# Patient Record
Sex: FEMALE | Race: WHITE | NOT HISPANIC OR LATINO | Employment: OTHER | URBAN - METROPOLITAN AREA
[De-identification: names, ages, dates, MRNs, and addresses within clinical notes are randomized per-mention and may not be internally consistent; named-entity substitution may affect disease eponyms.]

---

## 2017-03-02 ENCOUNTER — LAB VISIT (OUTPATIENT)
Dept: LAB | Facility: HOSPITAL | Age: 77
End: 2017-03-02
Attending: FAMILY MEDICINE
Payer: MEDICARE

## 2017-03-02 DIAGNOSIS — R73.09 ABNORMAL GLUCOSE: ICD-10-CM

## 2017-03-02 DIAGNOSIS — E78.49 OTHER HYPERLIPIDEMIA: ICD-10-CM

## 2017-03-02 LAB
ALBUMIN SERPL BCP-MCNC: 3.8 G/DL
ALP SERPL-CCNC: 56 U/L
ALT SERPL W/O P-5'-P-CCNC: 17 U/L
ANION GAP SERPL CALC-SCNC: 8 MMOL/L
AST SERPL-CCNC: 19 U/L
BILIRUB SERPL-MCNC: 0.4 MG/DL
BUN SERPL-MCNC: 20 MG/DL
CALCIUM SERPL-MCNC: 9.9 MG/DL
CHLORIDE SERPL-SCNC: 99 MMOL/L
CHOLEST/HDLC SERPL: 4 {RATIO}
CO2 SERPL-SCNC: 29 MMOL/L
CREAT SERPL-MCNC: 0.8 MG/DL
EST. GFR  (AFRICAN AMERICAN): >60 ML/MIN/1.73 M^2
EST. GFR  (NON AFRICAN AMERICAN): >60 ML/MIN/1.73 M^2
GLUCOSE SERPL-MCNC: 107 MG/DL
HDL/CHOLESTEROL RATIO: 24.9 %
HDLC SERPL-MCNC: 265 MG/DL
HDLC SERPL-MCNC: 66 MG/DL
LDLC SERPL CALC-MCNC: 173.8 MG/DL
NONHDLC SERPL-MCNC: 199 MG/DL
POTASSIUM SERPL-SCNC: 3.5 MMOL/L
PROT SERPL-MCNC: 7.5 G/DL
SODIUM SERPL-SCNC: 136 MMOL/L
TRIGL SERPL-MCNC: 126 MG/DL

## 2017-03-02 PROCEDURE — 83036 HEMOGLOBIN GLYCOSYLATED A1C: CPT

## 2017-03-02 PROCEDURE — 80053 COMPREHEN METABOLIC PANEL: CPT

## 2017-03-02 PROCEDURE — 36415 COLL VENOUS BLD VENIPUNCTURE: CPT | Mod: PO

## 2017-03-02 PROCEDURE — 80061 LIPID PANEL: CPT

## 2017-03-03 LAB
ESTIMATED AVG GLUCOSE: 114 MG/DL
HBA1C MFR BLD HPLC: 5.6 %

## 2017-03-10 ENCOUNTER — OFFICE VISIT (OUTPATIENT)
Dept: FAMILY MEDICINE | Facility: CLINIC | Age: 77
End: 2017-03-10
Payer: MEDICARE

## 2017-03-10 VITALS
DIASTOLIC BLOOD PRESSURE: 82 MMHG | OXYGEN SATURATION: 95 % | HEART RATE: 90 BPM | SYSTOLIC BLOOD PRESSURE: 138 MMHG | BODY MASS INDEX: 19.19 KG/M2 | HEIGHT: 61 IN | WEIGHT: 101.63 LBS

## 2017-03-10 DIAGNOSIS — E78.5 HYPERLIPIDEMIA, UNSPECIFIED HYPERLIPIDEMIA TYPE: ICD-10-CM

## 2017-03-10 DIAGNOSIS — R73.09 ABNORMAL GLUCOSE: ICD-10-CM

## 2017-03-10 DIAGNOSIS — E78.00 PURE HYPERCHOLESTEROLEMIA: ICD-10-CM

## 2017-03-10 DIAGNOSIS — I11.9 LEFT VENTRICULAR HYPERTROPHY DUE TO HYPERTENSIVE DISEASE, WITHOUT HEART FAILURE: ICD-10-CM

## 2017-03-10 DIAGNOSIS — I51.89 DIASTOLIC DYSFUNCTION: ICD-10-CM

## 2017-03-10 DIAGNOSIS — I10 ESSENTIAL HYPERTENSION, BENIGN: Primary | ICD-10-CM

## 2017-03-10 DIAGNOSIS — E87.6 HYPOKALEMIA: ICD-10-CM

## 2017-03-10 PROCEDURE — 99214 OFFICE O/P EST MOD 30 MIN: CPT | Mod: S$PBB,,, | Performed by: FAMILY MEDICINE

## 2017-03-10 PROCEDURE — 99213 OFFICE O/P EST LOW 20 MIN: CPT | Mod: PBBFAC,PO | Performed by: FAMILY MEDICINE

## 2017-03-10 PROCEDURE — 99999 PR PBB SHADOW E&M-EST. PATIENT-LVL III: CPT | Mod: PBBFAC,,, | Performed by: FAMILY MEDICINE

## 2017-03-10 RX ORDER — POTASSIUM CHLORIDE 20 MEQ/1
20 TABLET, EXTENDED RELEASE ORAL DAILY
Qty: 90 TABLET | Refills: 3 | Status: SHIPPED | OUTPATIENT
Start: 2017-03-10 | End: 2017-10-23 | Stop reason: SDUPTHER

## 2017-03-10 RX ORDER — VALSARTAN AND HYDROCHLOROTHIAZIDE 320; 25 MG/1; MG/1
1 TABLET, FILM COATED ORAL DAILY
Qty: 90 TABLET | Refills: 3 | Status: SHIPPED | OUTPATIENT
Start: 2017-03-10 | End: 2017-10-23 | Stop reason: SDUPTHER

## 2017-03-10 RX ORDER — NEOMYCIN SULFATE, POLYMYXIN B SULFATE, AND DEXAMETHASONE 3.5; 10000; 1 MG/G; [USP'U]/G; MG/G
OINTMENT OPHTHALMIC
Refills: 0 | COMMUNITY
Start: 2017-03-07 | End: 2019-02-26

## 2017-03-10 RX ORDER — OFLOXACIN 3 MG/ML
SOLUTION/ DROPS OPHTHALMIC
Refills: 0 | COMMUNITY
Start: 2017-03-07 | End: 2019-02-26

## 2017-03-10 RX ORDER — ROSUVASTATIN CALCIUM 20 MG/1
20 TABLET, COATED ORAL DAILY
Qty: 90 TABLET | Refills: 3 | Status: SHIPPED | OUTPATIENT
Start: 2017-03-10 | End: 2017-10-23 | Stop reason: SDUPTHER

## 2017-03-10 RX ORDER — ACETAMINOPHEN AND CODEINE PHOSPHATE 300; 30 MG/1; MG/1
1 TABLET ORAL
Refills: 0 | COMMUNITY
Start: 2017-01-11 | End: 2017-05-15

## 2017-03-10 RX ORDER — AMLODIPINE BESYLATE 5 MG/1
TABLET ORAL
Qty: 90 TABLET | Refills: 3 | Status: SHIPPED | OUTPATIENT
Start: 2017-03-10 | End: 2017-05-15 | Stop reason: DRUGHIGH

## 2017-03-10 NOTE — PROGRESS NOTES
Subjective:       Patient ID: Trudy Horvath is a 76 y.o. female.    Chief Complaint: Follow-up (4 mos); Hypertension; and Hyperlipidemia    Hypertension   This is a chronic problem. The current episode started more than 1 year ago. The problem is unchanged. Pertinent negatives include no chest pain, headaches, orthopnea, palpitations, peripheral edema, PND or shortness of breath. Past treatments include calcium channel blockers, angiotensin blockers and diuretics. The current treatment provides significant improvement. There are no compliance problems.  Hypertensive end-organ damage includes left ventricular hypertrophy. There is no history of chronic renal disease.   Hyperlipidemia   This is a chronic problem. The current episode started more than 1 year ago. The problem is controlled. Recent lipid tests were reviewed and are normal. She has no history of chronic renal disease, diabetes, hypothyroidism, liver disease, obesity or nephrotic syndrome. Pertinent negatives include no chest pain or shortness of breath.   Pt is being followed by Ophthalmology for macular degeneration. Pt has been told that she will need injections in her eye due to edema. 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 Pt is followed by Dr. Wilmer Hand.  Pt is not sure if she is taking her Rosuvastatin regularly. Is not exercising on a daily basis.  Pt has LVH and diastolic dysfunction by 2D Echo February 2015.  Results for orders placed or performed in visit on 03/02/17   Lipid panel   Result Value Ref Range    Cholesterol 265 (H) 120 - 199 mg/dL    Triglycerides 126 30 - 150 mg/dL    HDL 66 40 - 75 mg/dL    LDL Cholesterol 173.8 (H) 63.0 - 159.0 mg/dL    HDL/Chol Ratio 24.9 20.0 - 50.0 %    Total Cholesterol/HDL Ratio 4.0 2.0 - 5.0    Non-HDL Cholesterol 199 mg/dL   Comprehensive metabolic panel   Result Value Ref Range    Sodium 136 136 - 145 mmol/L    Potassium 3.5 3.5 - 5.1 mmol/L    Chloride 99 95 - 110 mmol/L     CO2 29 23 - 29 mmol/L    Glucose 107 70 - 110 mg/dL    BUN, Bld 20 8 - 23 mg/dL    Creatinine 0.8 0.5 - 1.4 mg/dL    Calcium 9.9 8.7 - 10.5 mg/dL    Total Protein 7.5 6.0 - 8.4 g/dL    Albumin 3.8 3.5 - 5.2 g/dL    Total Bilirubin 0.4 0.1 - 1.0 mg/dL    Alkaline Phosphatase 56 55 - 135 U/L    AST 19 10 - 40 U/L    ALT 17 10 - 44 U/L    Anion Gap 8 8 - 16 mmol/L    eGFR if African American >60.0 >60 mL/min/1.73 m^2    eGFR if non African American >60.0 >60 mL/min/1.73 m^2   Hemoglobin A1c   Result Value Ref Range    Hemoglobin A1C 5.6 4.5 - 6.2 %    Estimated Avg Glucose 114 68 - 131 mg/dL     Review of Systems   Constitutional: Negative for chills and fever.   Respiratory: Negative for shortness of breath.    Cardiovascular: Negative for chest pain, palpitations, orthopnea, leg swelling and PND.   Neurological: Negative for headaches.       Objective:      Physical Exam   Constitutional: She appears well-developed and well-nourished.   HENT:   Head: Normocephalic and atraumatic.   Neck: Normal range of motion. Neck supple. No JVD present. No thyromegaly present.   Pulmonary/Chest: Effort normal and breath sounds normal. She has no wheezes. She has no rales.   Abdominal: Soft. Bowel sounds are normal. She exhibits no mass. There is no tenderness.   Lymphadenopathy:     She has no cervical adenopathy.   Vitals reviewed.      Assessment:         See plan  Plan:       Trudy was seen today for follow-up, hypertension and hyperlipidemia.    Diagnoses and all orders for this visit:    Essential hypertension, benign: Controlled  -     amlodipine (NORVASC) 5 MG tablet; TAKE 1 TABLET (5 MG) DAILY  -     valsartan-hydrochlorothiazide (DIOVAN-HCT) 320-25 mg per tablet; Take 1 tablet by mouth once daily.    Left ventricular hypertrophy due to hypertensive disease, without heart failure: Stable    Hyperlipidemia, unspecified hyperlipidemia type: Uncontrolled  -     Stressed importance of taking rosuvastatin (CRESTOR) 20 MG  tablet; Take 1 tablet (20 mg total) by mouth once daily.  -     Lipid panel; Future  -     Comprehensive metabolic panel; Future    Body mass index (BMI) 19 or less, adult: Stable    Abnormal glucose  Pt advised to decrease intake of white bread, white rice, corn, potatoes, pasta and sugar.  -     Hemoglobin A1c; Future    Diastolic dysfunction: Stable      Hypokalemia: Controlled  -     potassium chloride SA (K-DUR,KLOR-CON) 20 MEQ tablet; Take 1 tablet (20 mEq total) by mouth once daily.    F/U in 4 weeks to recheck cholesterol. Pt to bring all medications to the visit with her for medication review.

## 2017-03-10 NOTE — MR AVS SNAPSHOT
[facility]   [place] [address]  Phone: [phone]  Fax: [phone]                  Trudy Horvath   3/10/2017 8:40 AM   Office Visit    Description:  Female : 1940   Provider:  Maria C Guzman MD   Department:  [facility]           Reason for Visit     Follow-up     Hypertension     Hyperlipidemia           Diagnoses this Visit        Comments    Essential hypertension, benign    -  Primary     Left ventricular hypertrophy due to hypertensive disease, without heart failure         Hyperlipidemia, unspecified hyperlipidemia type         Body mass index (BMI) 19 or less, adult         Abnormal glucose         Diastolic dysfunction         Pure hypercholesterolemia         Hypokalemia                To Do List           Future Appointments        Provider Department Dept Phone    2017 7:30 AM LAB, [facility] [phone]    2017 1:00 PM Maria C Guzman MD [facility] [phone]      Goals (5 Years of Data)     None      Follow-Up and Disposition     Return in about 4 weeks (around 2017).       These Medications        Disp Refills Start End    rosuvastatin (CRESTOR) 20 MG tablet 90 tablet 3 3/10/2017 3/10/2018    Take 1 tablet (20 mg total) by mouth once daily. - Oral    Pharmacy: [facility] - [address] Ph #: [phone]       potassium chloride SA (K-DUR,KLOR-CON) 20 MEQ tablet 90 tablet 3 3/10/2017     Take 1 tablet (20 mEq total) by mouth once daily. - Oral    Pharmacy: [facility] - [address] Ph #: [phone]       amlodipine (NORVASC) 5 MG tablet 90 tablet 3 3/10/2017     TAKE 1 TABLET (5 MG) DAILY    Pharmacy: [facility] - [address] Ph #: [phone]       valsartan-hydrochlorothiazide (DIOVAN-HCT) 320-25 mg per tablet 90 tablet 3  3/10/2017 3/10/2018    Take 1 tablet by mouth once daily. - Oral    Pharmacy: [facility] - [address] #: [phone]         [facility] On Call     [facility] On Call Nurse Care Line - 24/7 Assistance  Registered nurses in the [facility] On Call Center provide clinical advisement, health education, appointment booking, and other advisory services.  Call for this free service at [phone].             Medications           Message regarding Medications     Verify the changes and/or additions to your medication regime listed below are the same as discussed with your clinician today.  If any of these changes or additions are incorrect, please notify your healthcare provider.             Verify that the below list of medications is an accurate representation of the medications you are currently taking.  If none reported, the list may be blank. If incorrect, please contact your healthcare provider. Carry this list with you in case of emergency.           Current Medications     acetaminophen-codeine 300-30mg (TYLENOL #3) 300-30 mg Tab Take 1 tablet by mouth every 4 to 6 hours as needed.    amlodipine (NORVASC) 5 MG tablet TAKE 1 TABLET (5 MG) DAILY    aspirin 81 MG Chew Take 81 mg by mouth once daily.    denosumab (PROLIA) 60 mg/mL Syrg Inject 60 mg into the skin every 6 (six) months.    diclofenac (VOLTAREN) 75 MG EC tablet Take 75 mg by mouth 2 (two) times daily with meals.    diclofenac sodium 1 % Gel     gentamicin (GARAMYCIN) 0.3 % ophthalmic solution     ISTALOL 0.5 % DrpD     neomycin-polymyxin-dexamethasone (DEXACINE) 3.5 mg/g-10,000 unit/g-0.1 % Oint apply into right eye four times a day for 1 week    ofloxacin (OCUFLOX) 0.3 % ophthalmic solution instill 1 drop into right eye four times a day for 4 days    potassium chloride SA (K-DUR,KLOR-CON) 20 MEQ tablet Take 1 tablet (20 mEq total) by mouth once daily.    rosuvastatin (CRESTOR) 20 MG tablet Take 1 tablet (20 mg  "total) by mouth once daily.    valsartan-hydrochlorothiazide (DIOVAN-HCT) 320-25 mg per tablet Take 1 tablet by mouth once daily.    VIGAMOX 0.5 % ophthalmic solution            Clinical Reference Information           Your Vitals Were     BP Pulse Height Weight SpO2 BMI    138/82 90 5' 1" (1.549 m) 46.1 kg (101 lb 10.1 oz) 95% 19.2 kg/m2      Blood Pressure          Most Recent Value    BP  138/82      Allergies as of 3/10/2017     No Known Allergies      Immunizations Administered on Date of Encounter - 3/10/2017     None      Orders Placed During Today's Visit     Future Labs/Procedures Expected by Expires    Comprehensive metabolic panel  3/10/2017 3/10/2018    Hemoglobin A1c  3/10/2017 3/10/2018    Lipid panel  3/10/2017 3/10/2018      Instructions    Bring all medications to next visit.       Language Assistance Services     ATTENTION: Language assistance services are available, free of charge. Please call [phone].      ATENCIÓN: Si habla doreen, tiene a brock disposición servicios gratuitos de asistencia lingüística. Llame al [phone].     CHÚ Ý: N?u b?n nói Ti?ng Vi?t, có các d?ch v? h? tr? ngôn ng? mi?n phí jerrih cho b?n. G?i s? [phone].         [facility] complies with applicable Federal civil rights laws and does not discriminate on the basis of race, color, national origin, age, disability, or sex.        "

## 2017-05-08 ENCOUNTER — TELEPHONE (OUTPATIENT)
Dept: INTERNAL MEDICINE | Facility: CLINIC | Age: 77
End: 2017-05-08

## 2017-05-09 ENCOUNTER — LAB VISIT (OUTPATIENT)
Dept: LAB | Facility: HOSPITAL | Age: 77
End: 2017-05-09
Attending: FAMILY MEDICINE
Payer: MEDICARE

## 2017-05-09 DIAGNOSIS — E78.5 HYPERLIPIDEMIA, UNSPECIFIED HYPERLIPIDEMIA TYPE: ICD-10-CM

## 2017-05-09 DIAGNOSIS — R73.09 ABNORMAL GLUCOSE: ICD-10-CM

## 2017-05-09 LAB
ALBUMIN SERPL BCP-MCNC: 3.6 G/DL
ALP SERPL-CCNC: 49 U/L
ALT SERPL W/O P-5'-P-CCNC: 15 U/L
ANION GAP SERPL CALC-SCNC: 10 MMOL/L
AST SERPL-CCNC: 19 U/L
BILIRUB SERPL-MCNC: 0.5 MG/DL
BUN SERPL-MCNC: 14 MG/DL
CALCIUM SERPL-MCNC: 9.9 MG/DL
CHLORIDE SERPL-SCNC: 99 MMOL/L
CHOLEST/HDLC SERPL: 3.1 {RATIO}
CO2 SERPL-SCNC: 30 MMOL/L
CREAT SERPL-MCNC: 0.8 MG/DL
EST. GFR  (AFRICAN AMERICAN): >60 ML/MIN/1.73 M^2
EST. GFR  (NON AFRICAN AMERICAN): >60 ML/MIN/1.73 M^2
ESTIMATED AVG GLUCOSE: 123 MG/DL
GLUCOSE SERPL-MCNC: 113 MG/DL
HBA1C MFR BLD HPLC: 5.9 %
HDL/CHOLESTEROL RATIO: 32 %
HDLC SERPL-MCNC: 200 MG/DL
HDLC SERPL-MCNC: 64 MG/DL
LDLC SERPL CALC-MCNC: 110.8 MG/DL
NONHDLC SERPL-MCNC: 136 MG/DL
POTASSIUM SERPL-SCNC: 3.4 MMOL/L
PROT SERPL-MCNC: 6.9 G/DL
SODIUM SERPL-SCNC: 139 MMOL/L
TRIGL SERPL-MCNC: 126 MG/DL

## 2017-05-09 PROCEDURE — 80053 COMPREHEN METABOLIC PANEL: CPT

## 2017-05-09 PROCEDURE — 83036 HEMOGLOBIN GLYCOSYLATED A1C: CPT

## 2017-05-09 PROCEDURE — 36415 COLL VENOUS BLD VENIPUNCTURE: CPT | Mod: PO

## 2017-05-09 PROCEDURE — 80061 LIPID PANEL: CPT

## 2017-05-15 ENCOUNTER — OFFICE VISIT (OUTPATIENT)
Dept: FAMILY MEDICINE | Facility: CLINIC | Age: 77
End: 2017-05-15
Payer: MEDICARE

## 2017-05-15 VITALS
DIASTOLIC BLOOD PRESSURE: 80 MMHG | BODY MASS INDEX: 19.19 KG/M2 | HEIGHT: 61 IN | WEIGHT: 101.63 LBS | OXYGEN SATURATION: 95 % | HEART RATE: 95 BPM | SYSTOLIC BLOOD PRESSURE: 150 MMHG

## 2017-05-15 DIAGNOSIS — Z09 ENCOUNTER FOR FOLLOW-UP EXAMINATION AFTER COMPLETED TREATMENT FOR CONDITIONS OTHER THAN MALIGNANT NEOPLASM: ICD-10-CM

## 2017-05-15 DIAGNOSIS — Z12.11 COLON CANCER SCREENING: ICD-10-CM

## 2017-05-15 DIAGNOSIS — Z23 NEED FOR 23-POLYVALENT PNEUMOCOCCAL POLYSACCHARIDE VACCINE: ICD-10-CM

## 2017-05-15 DIAGNOSIS — R73.09 ABNORMAL GLUCOSE: ICD-10-CM

## 2017-05-15 DIAGNOSIS — I10 ESSENTIAL HYPERTENSION, BENIGN: ICD-10-CM

## 2017-05-15 DIAGNOSIS — E78.5 HYPERLIPIDEMIA, UNSPECIFIED HYPERLIPIDEMIA TYPE: Primary | ICD-10-CM

## 2017-05-15 PROCEDURE — 90732 PPSV23 VACC 2 YRS+ SUBQ/IM: CPT | Mod: PBBFAC,PO | Performed by: FAMILY MEDICINE

## 2017-05-15 PROCEDURE — 99999 PR PBB SHADOW E&M-EST. PATIENT-LVL III: CPT | Mod: PBBFAC,,, | Performed by: FAMILY MEDICINE

## 2017-05-15 PROCEDURE — 99213 OFFICE O/P EST LOW 20 MIN: CPT | Mod: PBBFAC,PO | Performed by: FAMILY MEDICINE

## 2017-05-15 PROCEDURE — 99214 OFFICE O/P EST MOD 30 MIN: CPT | Mod: S$PBB,,, | Performed by: FAMILY MEDICINE

## 2017-05-15 RX ORDER — AMLODIPINE BESYLATE 10 MG/1
10 TABLET ORAL DAILY
Qty: 90 TABLET | Refills: 1 | Status: SHIPPED | OUTPATIENT
Start: 2017-05-15 | End: 2017-10-23 | Stop reason: SDUPTHER

## 2017-05-15 NOTE — PROGRESS NOTES
Subjective:       Patient ID: Trudy Horvaht is a 76 y.o. female.    Chief Complaint: Follow-up (4 wks); Hypertension; and Hyperlipidemia  75 yo female with HTN, hyperlipidemia, macular degeneration and pre-diabetes presents for f/u hyperlipidemia and HTN.  Hypertension   This is a chronic problem. The current episode started more than 1 year ago. The problem is unchanged. The problem is controlled. Pertinent negatives include no chest pain, orthopnea, palpitations, peripheral edema, PND or shortness of breath. Past treatments include angiotensin blockers, diuretics and calcium channel blockers. The current treatment provides moderate improvement. There are no compliance problems.  Hypertensive end-organ damage includes left ventricular hypertrophy. There is no history of kidney disease. There is no history of chronic renal disease.   Hyperlipidemia   This is a chronic problem. The current episode started more than 1 year ago. The problem is controlled. Recent lipid tests were reviewed and are normal. She has no history of chronic renal disease, diabetes, hypothyroidism, liver disease, obesity or nephrotic syndrome. Pertinent negatives include no chest pain or shortness of breath. Current antihyperlipidemic treatment includes statins. The current treatment provides significant improvement of lipids. There are no compliance problems.    Pt notes pain in her right arm due to a botle of Coke striking her arm. Pt to see Orthopedics tomorrow.  Results for orders placed or performed in visit on 05/09/17   Lipid panel   Result Value Ref Range    Cholesterol 200 (H) 120 - 199 mg/dL    Triglycerides 126 30 - 150 mg/dL    HDL 64 40 - 75 mg/dL    LDL Cholesterol 110.8 63.0 - 159.0 mg/dL    HDL/Chol Ratio 32.0 20.0 - 50.0 %    Total Cholesterol/HDL Ratio 3.1 2.0 - 5.0    Non-HDL Cholesterol 136 mg/dL   Comprehensive metabolic panel   Result Value Ref Range    Sodium 139 136 - 145 mmol/L    Potassium 3.4 (L) 3.5 - 5.1 mmol/L     Chloride 99 95 - 110 mmol/L    CO2 30 (H) 23 - 29 mmol/L    Glucose 113 (H) 70 - 110 mg/dL    BUN, Bld 14 8 - 23 mg/dL    Creatinine 0.8 0.5 - 1.4 mg/dL    Calcium 9.9 8.7 - 10.5 mg/dL    Total Protein 6.9 6.0 - 8.4 g/dL    Albumin 3.6 3.5 - 5.2 g/dL    Total Bilirubin 0.5 0.1 - 1.0 mg/dL    Alkaline Phosphatase 49 (L) 55 - 135 U/L    AST 19 10 - 40 U/L    ALT 15 10 - 44 U/L    Anion Gap 10 8 - 16 mmol/L    eGFR if African American >60.0 >60 mL/min/1.73 m^2    eGFR if non African American >60.0 >60 mL/min/1.73 m^2   Hemoglobin A1c   Result Value Ref Range    Hemoglobin A1C 5.9 4.5 - 6.2 %    Estimated Avg Glucose 123 68 - 131 mg/dL     Review of Systems   Constitutional: Negative for chills and fever.   Respiratory: Negative for shortness of breath.    Cardiovascular: Negative for chest pain, palpitations, orthopnea, leg swelling and PND.   Musculoskeletal:        See HPI       Objective:      Physical Exam   Constitutional: She appears well-developed and well-nourished.   HENT:   Head: Normocephalic and atraumatic.   Neck: Normal range of motion. Neck supple. No JVD present. No thyromegaly present.   Cardiovascular: Normal rate, regular rhythm and normal heart sounds.    Pulmonary/Chest: Effort normal and breath sounds normal. She has no wheezes. She has no rales.   Abdominal: Soft. Bowel sounds are normal. She exhibits no mass. There is no tenderness.   Lymphadenopathy:     She has no cervical adenopathy.   Skin: Skin is warm and dry.   Vitals reviewed.      Assessment:         See plan  Plan:       Trudy was seen today for follow-up, hypertension and hyperlipidemia.    Diagnoses and all orders for this visit:    Hyperlipidemia, unspecified hyperlipidemia type: Controlled    Essential hypertension, benign: Uncontrolled  -     Increase amlodipine (NORVASC) 10 MG tablet; Take 1 tablet (10 mg total) by mouth once daily.  -    Recheck BP in 4 weeks.    Body mass index (BMI) 19 or less, adult: Stable    Abnormal  glucose  Pt advised to decrease intake of white bread, white rice, corn, potatoes, pasta and sugar.    Colon cancer screening  -     Occult blood x 1, stool; Future  -     Occult blood x 1, stool; Future  -     Occult blood x 1, stool; Future    Need for 23-polyvalent pneumococcal polysaccharide vaccine  -     Pneumococcal Polysaccharide Vaccine (23 Valent) (SQ/IM)    Encounter for follow-up examination after completed treatment for conditions other than malignant neoplasm   -     Occult blood x 1, stool; Future  -     Occult blood x 1, stool; Future  -     Occult blood x 1, stool; Future    F/U in 4 weeks for recheck of HTN.

## 2017-05-15 NOTE — MR AVS SNAPSHOT
[facility]   [place]  [place] [address]  Phone: [phone]  Fax: [phone]                  Trudy Horvath   5/15/2017 2:00 PM   Office Visit    Description:  Female : 1940   Provider:  Maria C Guzman MD   Department:  [facility]           Reason for Visit     Follow-up     Hypertension     Hyperlipidemia           Diagnoses this Visit        Comments    Hyperlipidemia, unspecified hyperlipidemia type    -  Primary     Essential hypertension, benign         Body mass index (BMI) 19 or less, adult         Abnormal glucose         Colon cancer screening         Need for 23-polyvalent pneumococcal polysaccharide vaccine         Encounter for follow-up examination after completed treatment for conditions other than malignant neoplasm                To Do List           Future Appointments        Provider Department Dept Phone    2017 4:20 PM Maria C Guzman MD [facility] [phone]      Goals (5 Years of Data)     None      Follow-Up and Disposition     Return in about 4 weeks (around 2017) for recheck of blood pressure.       These Medications        Disp Refills Start End    amlodipine (NORVASC) 10 MG tablet 90 tablet 1 5/15/2017 5/15/2018    Take 1 tablet (10 mg total) by mouth once daily. - Oral    Pharmacy: [facility] - [address] #: [phone]         [facility] On Call     [facility] On Call Nurse Care Line -  Assistance  Unless otherwise directed by your provider, please contact [facility], our nurse care line that is available for / assistance.     Registered nurses in the [facility] On Call Center provide: appointment scheduling, clinical advisement, health education, and other advisory services.  Call: [phone] (toll free)               Medications           Message regarding Medications     Verify the changes and/or additions to your medication  "regime listed below are the same as discussed with your clinician today.  If any of these changes or additions are incorrect, please notify your healthcare provider.        START taking these NEW medications        Refills    amlodipine (NORVASC) 10 MG tablet 1    Sig: Take 1 tablet (10 mg total) by mouth once daily.    Class: Normal    Route: Oral      STOP taking these medications     acetaminophen-codeine 300-30mg (TYLENOL #3) 300-30 mg Tab Take 1 tablet by mouth every 4 to 6 hours as needed.           Verify that the below list of medications is an accurate representation of the medications you are currently taking.  If none reported, the list may be blank. If incorrect, please contact your healthcare provider. Carry this list with you in case of emergency.           Current Medications     aspirin 81 MG Chew Take 81 mg by mouth once daily.    denosumab (PROLIA) 60 mg/mL Syrg Inject 60 mg into the skin every 6 (six) months.    diclofenac (VOLTAREN) 75 MG EC tablet Take 75 mg by mouth 2 (two) times daily with meals.    diclofenac sodium 1 % Gel     gentamicin (GARAMYCIN) 0.3 % ophthalmic solution     ISTALOL 0.5 % DrpD     neomycin-polymyxin-dexamethasone (DEXACINE) 3.5 mg/g-10,000 unit/g-0.1 % Oint apply into right eye four times a day for 1 week    ofloxacin (OCUFLOX) 0.3 % ophthalmic solution instill 1 drop into right eye four times a day for 4 days    potassium chloride SA (K-DUR,KLOR-CON) 20 MEQ tablet Take 1 tablet (20 mEq total) by mouth once daily.    rosuvastatin (CRESTOR) 20 MG tablet Take 1 tablet (20 mg total) by mouth once daily.    valsartan-hydrochlorothiazide (DIOVAN-HCT) 320-25 mg per tablet Take 1 tablet by mouth once daily.    VIGAMOX 0.5 % ophthalmic solution     amlodipine (NORVASC) 10 MG tablet Take 1 tablet (10 mg total) by mouth once daily.           Clinical Reference Information           Your Vitals Were     BP Pulse Height Weight SpO2 BMI    150/80 95 5' 1" (1.549 m) 46.1 kg (101 lb " 10.1 oz) 95% 19.2 kg/m2      Blood Pressure          Most Recent Value    BP  (!)  150/80      Allergies as of 5/15/2017     No Known Allergies      Immunizations Administered on Date of Encounter - 5/15/2017     Name Date Dose VIS Date Route    Pneumococcal Polysaccharide - 23 Valent  Incomplete 0.5 mL 4/24/2015 Intramuscular      Orders Placed During Today's Visit      Normal Orders This Visit    Pneumococcal Polysaccharide Vaccine (23 Valent) (SQ/IM)     Future Labs/Procedures Expected by Expires    Occult blood x 1, stool  5/15/2017 5/15/2018    Occult blood x 1, stool  5/15/2017 5/15/2018    Occult blood x 1, stool  5/15/2017 5/15/2018      Language Assistance Services     ATTENTION: Language assistance services are available, free of charge. Please call [phone].      ATENCIÓN: Si habla doreen, tiene a brock disposición servicios gratuitos de asistencia lingüística. Llame al [phone].     CHÚ Ý: N?u b?n nói Ti?ng Vi?t, có các d?ch v? h? tr? ngôn ng? mi?n phí dành cho b?n. G?i s? [phone].         [facility] complies with applicable Federal civil rights laws and does not discriminate on the basis of race, color, national origin, age, disability, or sex.

## 2017-05-27 ENCOUNTER — DOCUMENTATION ONLY (OUTPATIENT)
Dept: FAMILY MEDICINE | Facility: CLINIC | Age: 77
End: 2017-05-27

## 2017-05-27 NOTE — PROGRESS NOTES
Orthopedics appt with Dr. Earl Bardales on 5/16/17 for shoulder pain and lumbar spondylosis. Received a lumbar trigger point injection with Kenalog and Lidocaine and right subacromial injection.

## 2017-05-31 ENCOUNTER — OFFICE VISIT (OUTPATIENT)
Dept: FAMILY MEDICINE | Facility: CLINIC | Age: 77
End: 2017-05-31
Payer: MEDICARE

## 2017-05-31 VITALS
SYSTOLIC BLOOD PRESSURE: 138 MMHG | WEIGHT: 100.06 LBS | HEIGHT: 61 IN | BODY MASS INDEX: 18.89 KG/M2 | DIASTOLIC BLOOD PRESSURE: 88 MMHG | HEART RATE: 95 BPM | OXYGEN SATURATION: 99 %

## 2017-05-31 DIAGNOSIS — R73.09 ABNORMAL GLUCOSE: ICD-10-CM

## 2017-05-31 DIAGNOSIS — I11.9 LEFT VENTRICULAR HYPERTROPHY DUE TO HYPERTENSIVE DISEASE, WITHOUT HEART FAILURE: ICD-10-CM

## 2017-05-31 DIAGNOSIS — I51.89 DIASTOLIC DYSFUNCTION: ICD-10-CM

## 2017-05-31 DIAGNOSIS — I10 ESSENTIAL HYPERTENSION, BENIGN: Primary | ICD-10-CM

## 2017-05-31 PROCEDURE — 99214 OFFICE O/P EST MOD 30 MIN: CPT | Mod: S$PBB,,, | Performed by: FAMILY MEDICINE

## 2017-05-31 PROCEDURE — 99213 OFFICE O/P EST LOW 20 MIN: CPT | Mod: PBBFAC,PO | Performed by: FAMILY MEDICINE

## 2017-05-31 PROCEDURE — 99999 PR PBB SHADOW E&M-EST. PATIENT-LVL III: CPT | Mod: PBBFAC,,, | Performed by: FAMILY MEDICINE

## 2017-05-31 NOTE — PROGRESS NOTES
Subjective:       Patient ID: Trudy Horvath is a 76 y.o. female.    Chief Complaint: Follow-up (3 month) and Hypertension  75 yo female with HTN, hyperlipidemia and pre-diabetes presents for f/u uncontrolled HTN.  Hypertension   This is a chronic problem. The current episode started more than 1 year ago. The problem is unchanged. Pertinent negatives include no chest pain, orthopnea, palpitations, peripheral edema, PND or shortness of breath. Past treatments include calcium channel blockers, angiotensin blockers and diuretics. The current treatment provides significant improvement. There are no compliance problems.  Hypertensive end-organ damage includes left ventricular hypertrophy.   Ambulatory BP readings have been controlled. BP Has ranged from 110-140/60-70. Is checking her BGs weekly. Amlodipine increased to 10 mg daily at last visit  5/15/17.  Pt has LVH and diastolic dysfunction by 2D Echo done 2/2/15.  Review of Systems   Respiratory: Negative for shortness of breath.    Cardiovascular: Negative for chest pain, palpitations, orthopnea and PND.       Objective:      Physical Exam   Constitutional: She is oriented to person, place, and time. She appears well-developed and well-nourished.   HENT:   Head: Normocephalic and atraumatic.   Neck: Normal range of motion. Neck supple. No JVD present. No thyromegaly present.   Cardiovascular: Normal rate and regular rhythm.    Murmur heard.   Systolic murmur is present with a grade of 3/6   Pulmonary/Chest: Effort normal and breath sounds normal. She has no wheezes. She has no rales.   Abdominal: Soft. Bowel sounds are normal. She exhibits no mass. There is no tenderness.   Lymphadenopathy:     She has no cervical adenopathy.   Neurological: She is alert and oriented to person, place, and time.   Skin: Skin is warm and dry.   Vitals reviewed.      Assessment:     See plan      Plan:       Trudy was seen today for follow-up and hypertension.    Diagnoses and all orders for  this visit:    Essential hypertension, benign: Controlled  -     CBC auto differential; Future  -     Comprehensive metabolic panel; Future    Left ventricular hypertrophy due to hypertensive disease, without heart failure: Stable    Diastolic dysfunction: Stable    Abnormal glucose  Pt advised to decrease intake of white bread, white rice, corn, potatoes, pasta and sugar.  -     Hemoglobin A1c; Future    F/U in 4 months.

## 2017-06-05 ENCOUNTER — LAB VISIT (OUTPATIENT)
Dept: LAB | Facility: HOSPITAL | Age: 77
End: 2017-06-05
Attending: FAMILY MEDICINE
Payer: MEDICARE

## 2017-06-05 DIAGNOSIS — Z09 ENCOUNTER FOR FOLLOW-UP EXAMINATION AFTER COMPLETED TREATMENT FOR CONDITIONS OTHER THAN MALIGNANT NEOPLASM: ICD-10-CM

## 2017-06-05 DIAGNOSIS — Z12.11 COLON CANCER SCREENING: ICD-10-CM

## 2017-06-05 PROCEDURE — 82272 OCCULT BLD FECES 1-3 TESTS: CPT

## 2017-06-06 ENCOUNTER — LAB VISIT (OUTPATIENT)
Dept: LAB | Facility: HOSPITAL | Age: 77
End: 2017-06-06
Attending: FAMILY MEDICINE
Payer: MEDICARE

## 2017-06-06 DIAGNOSIS — Z12.11 COLON CANCER SCREENING: ICD-10-CM

## 2017-06-06 DIAGNOSIS — Z09 ENCOUNTER FOR FOLLOW-UP EXAMINATION AFTER COMPLETED TREATMENT FOR CONDITIONS OTHER THAN MALIGNANT NEOPLASM: ICD-10-CM

## 2017-06-06 LAB — OB PNL STL: NEGATIVE

## 2017-06-06 PROCEDURE — 82272 OCCULT BLD FECES 1-3 TESTS: CPT

## 2017-06-07 LAB — OB PNL STL: NEGATIVE

## 2017-06-08 ENCOUNTER — LAB VISIT (OUTPATIENT)
Dept: LAB | Facility: HOSPITAL | Age: 77
End: 2017-06-08
Attending: FAMILY MEDICINE
Payer: MEDICARE

## 2017-06-08 DIAGNOSIS — Z09 ENCOUNTER FOR FOLLOW-UP EXAMINATION AFTER COMPLETED TREATMENT FOR CONDITIONS OTHER THAN MALIGNANT NEOPLASM: ICD-10-CM

## 2017-06-08 DIAGNOSIS — Z12.11 COLON CANCER SCREENING: ICD-10-CM

## 2017-06-08 PROCEDURE — 82272 OCCULT BLD FECES 1-3 TESTS: CPT

## 2017-06-09 LAB — OB PNL STL: NEGATIVE

## 2017-08-07 ENCOUNTER — TELEPHONE (OUTPATIENT)
Dept: FAMILY MEDICINE | Facility: CLINIC | Age: 77
End: 2017-08-07

## 2017-08-07 DIAGNOSIS — R92.8 ABNORMAL MAMMOGRAM OF BOTH BREASTS: ICD-10-CM

## 2017-08-07 DIAGNOSIS — N60.09 CYST OF BREAST, UNSPECIFIED LATERALITY: Primary | ICD-10-CM

## 2017-08-07 NOTE — TELEPHONE ENCOUNTER
----- Message from Hilda Joya MA sent at 8/7/2017 11:34 AM CDT -----  Contact: [phone] / self       ----- Message -----  From: Ely Foley  Sent: 8/7/2017   9:57 AM  To: Thomas Lombardi A Staff    Patient is requesting her mammogram orders sent to [facility]. Please advise.

## 2017-10-23 ENCOUNTER — OFFICE VISIT (OUTPATIENT)
Dept: FAMILY MEDICINE | Facility: CLINIC | Age: 77
End: 2017-10-23
Payer: MEDICARE

## 2017-10-23 ENCOUNTER — LAB VISIT (OUTPATIENT)
Dept: LAB | Facility: HOSPITAL | Age: 77
End: 2017-10-23
Attending: FAMILY MEDICINE
Payer: MEDICARE

## 2017-10-23 VITALS
SYSTOLIC BLOOD PRESSURE: 138 MMHG | BODY MASS INDEX: 18.61 KG/M2 | WEIGHT: 98.56 LBS | OXYGEN SATURATION: 97 % | HEIGHT: 61 IN | DIASTOLIC BLOOD PRESSURE: 80 MMHG | HEART RATE: 85 BPM

## 2017-10-23 DIAGNOSIS — M81.0 OSTEOPOROSIS, POST-MENOPAUSAL: ICD-10-CM

## 2017-10-23 DIAGNOSIS — E78.5 HYPERLIPIDEMIA, UNSPECIFIED HYPERLIPIDEMIA TYPE: ICD-10-CM

## 2017-10-23 DIAGNOSIS — I10 ESSENTIAL HYPERTENSION, BENIGN: ICD-10-CM

## 2017-10-23 DIAGNOSIS — R73.09 ABNORMAL GLUCOSE: ICD-10-CM

## 2017-10-23 DIAGNOSIS — I10 ESSENTIAL HYPERTENSION, BENIGN: Primary | ICD-10-CM

## 2017-10-23 DIAGNOSIS — Z23 NEED FOR INFLUENZA VACCINATION: ICD-10-CM

## 2017-10-23 DIAGNOSIS — E87.6 HYPOKALEMIA: ICD-10-CM

## 2017-10-23 LAB
ALBUMIN SERPL BCP-MCNC: 3.5 G/DL
ALP SERPL-CCNC: 60 U/L
ALT SERPL W/O P-5'-P-CCNC: 24 U/L
ANION GAP SERPL CALC-SCNC: 10 MMOL/L
AST SERPL-CCNC: 20 U/L
BILIRUB SERPL-MCNC: 0.4 MG/DL
BUN SERPL-MCNC: 32 MG/DL
CALCIUM SERPL-MCNC: 9.9 MG/DL
CHLORIDE SERPL-SCNC: 104 MMOL/L
CHOLEST SERPL-MCNC: 213 MG/DL
CHOLEST/HDLC SERPL: 3.2 {RATIO}
CO2 SERPL-SCNC: 27 MMOL/L
CREAT SERPL-MCNC: 0.8 MG/DL
EST. GFR  (AFRICAN AMERICAN): >60 ML/MIN/1.73 M^2
EST. GFR  (NON AFRICAN AMERICAN): >60 ML/MIN/1.73 M^2
ESTIMATED AVG GLUCOSE: 111 MG/DL
GLUCOSE SERPL-MCNC: 107 MG/DL
HBA1C MFR BLD HPLC: 5.5 %
HDLC SERPL-MCNC: 66 MG/DL
HDLC SERPL: 31 %
LDLC SERPL CALC-MCNC: 126.4 MG/DL
NONHDLC SERPL-MCNC: 147 MG/DL
POTASSIUM SERPL-SCNC: 3.9 MMOL/L
PROT SERPL-MCNC: 7.2 G/DL
SODIUM SERPL-SCNC: 141 MMOL/L
TRIGL SERPL-MCNC: 103 MG/DL

## 2017-10-23 PROCEDURE — 99214 OFFICE O/P EST MOD 30 MIN: CPT | Mod: S$PBB,,, | Performed by: FAMILY MEDICINE

## 2017-10-23 PROCEDURE — 99999 PR PBB SHADOW E&M-EST. PATIENT-LVL III: CPT | Mod: PBBFAC,,, | Performed by: FAMILY MEDICINE

## 2017-10-23 PROCEDURE — 80053 COMPREHEN METABOLIC PANEL: CPT

## 2017-10-23 PROCEDURE — 80061 LIPID PANEL: CPT

## 2017-10-23 PROCEDURE — 36415 COLL VENOUS BLD VENIPUNCTURE: CPT | Mod: PO

## 2017-10-23 PROCEDURE — G0008 ADMIN INFLUENZA VIRUS VAC: HCPCS | Mod: PBBFAC,PO

## 2017-10-23 PROCEDURE — 99213 OFFICE O/P EST LOW 20 MIN: CPT | Mod: PBBFAC,PO,25 | Performed by: FAMILY MEDICINE

## 2017-10-23 PROCEDURE — 83036 HEMOGLOBIN GLYCOSYLATED A1C: CPT

## 2017-10-23 RX ORDER — ROSUVASTATIN CALCIUM 20 MG/1
20 TABLET, COATED ORAL DAILY
Qty: 90 TABLET | Refills: 3 | Status: SHIPPED | OUTPATIENT
Start: 2017-10-23 | End: 2018-02-23 | Stop reason: SDUPTHER

## 2017-10-23 RX ORDER — POTASSIUM CHLORIDE 20 MEQ/1
20 TABLET, EXTENDED RELEASE ORAL DAILY
Qty: 90 TABLET | Refills: 3 | Status: SHIPPED | OUTPATIENT
Start: 2017-10-23 | End: 2018-02-23 | Stop reason: SDUPTHER

## 2017-10-23 RX ORDER — VALSARTAN AND HYDROCHLOROTHIAZIDE 320; 25 MG/1; MG/1
1 TABLET, FILM COATED ORAL DAILY
Qty: 90 TABLET | Refills: 3 | Status: SHIPPED | OUTPATIENT
Start: 2017-10-23 | End: 2018-02-23 | Stop reason: SDUPTHER

## 2017-10-23 RX ORDER — AMLODIPINE BESYLATE 10 MG/1
10 TABLET ORAL DAILY
Qty: 90 TABLET | Refills: 3 | Status: SHIPPED | OUTPATIENT
Start: 2017-10-23 | End: 2018-02-23 | Stop reason: SDUPTHER

## 2017-10-23 NOTE — MEDICAL/APP STUDENT
[facility]  Clinic Note    Subjective:       Patient ID: Trudy Horvath is a 77 y.o. female.    Chief Complaint: Follow-up (4 mos); Hypertension; and Hyperlipidemia    Pt presenting today for 6 month follow up.  Pt has a history of HTN, HLD, abnormal glucose readings, and osteoporosis.  Pt reports BP readings of around 120/80 when she checks it at the store, usually around once every three weeks.   Pt reports good compliance with her medications.      Pt reports poor compliance with a low sugar and low carb diet.  Pt had HbA1c of 5.8 11 months ago with fluctuation between 5.6 and 5.8 over the past three years.      Pt with macular degeneration who follows regularly with opthalmology.  She denies any acute vision changes.      Pt reports feeling well today.  Pt denies CP, SOB, fever, chills, abd pain, changes in BMs, dysuria, HA, N/V/D, palpitations.  No other complaints.        Review of Systems   Constitutional: Negative for appetite change, chills, fever and unexpected weight change.   HENT: Negative for congestion, sinus pain, sinus pressure and sore throat.    Eyes: Negative for visual disturbance.   Respiratory: Negative for cough and shortness of breath.    Cardiovascular: Negative for chest pain.   Gastrointestinal: Negative for abdominal pain, constipation, diarrhea, nausea and vomiting.   Genitourinary: Negative for dysuria.   Musculoskeletal: Negative for arthralgias.   Skin: Negative for rash.   Neurological: Negative for light-headedness and headaches.       Objective:      Vitals:    10/23/17 0905   BP: 138/80   Pulse:      Physical Exam   Constitutional: She is oriented to person, place, and time. She appears well-developed and well-nourished. No distress.   HENT:   Head: Normocephalic.   Right Ear: External ear normal.   Left Ear: External ear normal.   Mouth/Throat: Oropharynx is clear and moist.   Eyes: Conjunctivae and EOM are normal. Pupils are equal, round, and reactive to light.    Neck: No thyromegaly present.   Cardiovascular: Normal rate and regular rhythm.    Murmur heard.   Systolic murmur is present with a grade of 2/6   Pulses:       Carotid pulses are 2+ on the right side, and 2+ on the left side.       Radial pulses are 2+ on the right side, and 2+ on the left side.        Dorsalis pedis pulses are 0 on the right side, and 0 on the left side.        Posterior tibial pulses are 1+ on the right side, and 1+ on the left side.   Pulmonary/Chest: Breath sounds normal.   Abdominal: Soft. Bowel sounds are normal. She exhibits no distension.   Musculoskeletal: She exhibits no edema.   Lymphadenopathy:     She has no cervical adenopathy.   Neurological: She is alert and oriented to person, place, and time.       Assessment:   Trudy Horvath is a 77 y.o. female presenting today for 6 month follow-up.  Pt with repeat /80 today and no other complaints.  Plan:     Trudy was seen today for follow-up, hypertension and hyperlipidemia.    Diagnoses and all orders for this visit:    Essential hypertension, benign  -     Comprehensive metabolic panel; Future  -     amlodipine (NORVASC) 10 MG tablet; Take 1 tablet (10 mg total) by mouth once daily.  -     valsartan-hydrochlorothiazide (DIOVAN-HCT) 320-25 mg per tablet; Take 1 tablet by mouth once daily.    Hyperlipidemia, unspecified hyperlipidemia type  -     Comprehensive metabolic panel; Future  -     Lipid panel; Future  -     rosuvastatin (CRESTOR) 20 MG tablet; Take 1 tablet (20 mg total) by mouth once daily.    Abnormal glucose  -     Hemoglobin A1c; Future    Osteoporosis, post-menopausal  -     DXA Bone Density Spine And Hip; Future    Body mass index (BMI) less than or equal to 19 in adult    Need for influenza vaccination  -     Influenza - High Dose (65+) (PF) (IM)    Hypokalemia  -     potassium chloride SA (K-DUR,KLOR-CON) 20 MEQ tablet; Take 1 tablet (20 mEq total) by mouth once daily.        Dispo: RTC 4 months    The above plan was  discussed with Dr. Guzman and changed as needed.    Ashely Cristobal, MS4  [facility]

## 2017-10-23 NOTE — PROGRESS NOTES
Subjective:       Patient ID: Trudy Horvath is a 77 y.o. female.    Chief Complaint: Follow-up (4 mos); Hypertension; and Hyperlipidemia  78 yo female with HTN, hyperlipidemia, macular degeneration and pre-diabetes presents for f/u of her chronic conditions.  Hypertension   This is a chronic problem. The current episode started more than 1 year ago. Pertinent negatives include no chest pain, headaches, orthopnea, palpitations, peripheral edema, PND or shortness of breath. Past treatments include calcium channel blockers, angiotensin blockers and diuretics. There are no compliance problems.  There is no history of chronic renal disease.   Hyperlipidemia   This is a chronic problem. The current episode started more than 1 year ago. The problem is controlled. Recent lipid tests were reviewed and are normal. She has no history of chronic renal disease, diabetes, hypothyroidism, liver disease or obesity. Pertinent negatives include no chest pain, focal sensory loss, focal weakness, leg pain, myalgias or shortness of breath.   Is checking BPs weekly. Ambulatory BP readings average 120/80.  Has not been following a low-carbohydrate diet. Pt is preparing for her granddaughter's wedding on 11/17/17.  Review of Systems   Constitutional: Negative for chills, fever and unexpected weight change.   Respiratory: Negative for shortness of breath.    Cardiovascular: Negative for chest pain, palpitations, orthopnea, leg swelling and PND.   Gastrointestinal: Negative for abdominal pain.   Genitourinary: Negative for dysuria.   Musculoskeletal: Negative for myalgias.   Neurological: Negative for focal weakness and headaches.       Objective:      Physical Exam   Constitutional: She is oriented to person, place, and time. She appears well-developed and well-nourished.   HENT:   Head: Normocephalic and atraumatic.   Right Ear: External ear normal.   Left Ear: External ear normal.   Neck: Normal range of motion. Neck supple. No JVD present.  No thyromegaly present.   Cardiovascular: Normal rate, regular rhythm and intact distal pulses.    Murmur heard.   Systolic murmur is present with a grade of 2/6   Pulmonary/Chest: Effort normal and breath sounds normal. She has no wheezes. She has no rales.   Abdominal: Soft. Bowel sounds are normal. She exhibits no mass. There is no tenderness.   Lymphadenopathy:     She has no cervical adenopathy.   Neurological: She is alert and oriented to person, place, and time.   Skin: Skin is warm and dry.   Vitals reviewed.      Assessment:         See plan  Plan:       Trudy was seen today for follow-up, hypertension and hyperlipidemia.    Diagnoses and all orders for this visit:    Essential hypertension, benign: Controlled  -     Comprehensive metabolic panel; Future  -     amlodipine (NORVASC) 10 MG tablet; Take 1 tablet (10 mg total) by mouth once daily.  -     valsartan-hydrochlorothiazide (DIOVAN-HCT) 320-25 mg per tablet; Take 1 tablet by mouth once daily.    Hyperlipidemia, unspecified hyperlipidemia type: Stable  -     Comprehensive metabolic panel; Future  -     Lipid panel; Future  -     rosuvastatin (CRESTOR) 20 MG tablet; Take 1 tablet (20 mg total) by mouth once daily.    Abnormal glucose  Pt advised to decrease intake of white bread, white rice, corn, potatoes, pasta and sugar.  -     Hemoglobin A1c; Future    Osteoporosis, post-menopausal  -     DXA Bone Density Spine And Hip; Future    Body mass index (BMI) less than or equal to 19 in adult: Stable    Need for influenza vaccination  -     Influenza - High Dose (65+) (PF) (IM)    Hypokalemia: Stable  -     potassium chloride SA (K-DUR,KLOR-CON) 20 MEQ tablet; Take 1 tablet (20 mEq total) by mouth once daily.    F/U in 4 months.

## 2017-11-06 ENCOUNTER — HOSPITAL ENCOUNTER (OUTPATIENT)
Dept: RADIOLOGY | Facility: HOSPITAL | Age: 77
Discharge: HOME | End: 2017-11-06
Attending: FAMILY MEDICINE
Payer: MEDICARE

## 2017-11-06 PROCEDURE — 77080 DXA BONE DENSITY AXIAL: CPT | Mod: 26,,, | Performed by: RADIOLOGY

## 2017-11-06 PROCEDURE — 77080 DXA BONE DENSITY AXIAL: CPT | Mod: TC

## 2017-11-07 ENCOUNTER — TELEPHONE (OUTPATIENT)
Dept: INFUSION THERAPY | Facility: HOSPITAL | Age: 77
End: 2017-11-07

## 2017-11-28 ENCOUNTER — INFUSION (OUTPATIENT)
Dept: INFUSION THERAPY | Facility: HOSPITAL | Age: 77
End: 2017-11-28
Attending: FAMILY MEDICINE
Payer: MEDICARE

## 2017-11-28 VITALS — BODY MASS INDEX: 18.5 KG/M2 | HEIGHT: 61 IN | WEIGHT: 98 LBS

## 2017-11-28 DIAGNOSIS — M81.0 OSTEOPOROSIS, POST-MENOPAUSAL: Primary | ICD-10-CM

## 2017-11-28 PROCEDURE — 96372 THER/PROPH/DIAG INJ SC/IM: CPT

## 2017-11-28 PROCEDURE — 63600175 PHARM REV CODE 636 W HCPCS: Performed by: FAMILY MEDICINE

## 2017-11-28 RX ADMIN — DENOSUMAB 60 MG: 60 INJECTION SUBCUTANEOUS at 02:11

## 2017-11-28 NOTE — NURSING
Tolerated Prolia injection well. Instructed pt to increase terrie PO water intake over the next 48 hours. Verbalized understanding Pt is not currently taking Ca and Vit D supplements, which are recommended when taking Prolia.  Notified Dr. Guzman through [employer] and told pt that if Dr. Guzman wants her to start the Ca and Vit D she will let her know. Discharged home to follow up with MD as scheduled.

## 2017-11-29 ENCOUNTER — TELEPHONE (OUTPATIENT)
Dept: FAMILY MEDICINE | Facility: CLINIC | Age: 77
End: 2017-11-29

## 2017-11-29 NOTE — TELEPHONE ENCOUNTER
----- Message from Aide Villagomez RN sent at 11/28/2017  1:57 PM CST -----  Hi Dr. Guzman,    Ms. Horvath was here today for her Prolia injection. It is recommendaed that Prolia pts take Ca and Vit D, however she is not on these medications. I told her I would send you a message, and if you wanted her to take Ca and Vit D supplements you would call and let her know. She was very appreciative and agreeable to this.    Please let her know so she can get started.    Thanks,  Aide Villagomez RN

## 2017-11-30 ENCOUNTER — TELEPHONE (OUTPATIENT)
Dept: FAMILY MEDICINE | Facility: CLINIC | Age: 77
End: 2017-11-30

## 2017-11-30 NOTE — TELEPHONE ENCOUNTER
----- Message from Maria C Guzman MD sent at 11/29/2017  5:22 PM CST -----  Please call pt to take 1,200 mg of calcium and 400 units of vitamin D daily

## 2018-02-20 ENCOUNTER — LAB VISIT (OUTPATIENT)
Dept: LAB | Facility: HOSPITAL | Age: 78
End: 2018-02-20
Attending: FAMILY MEDICINE
Payer: MEDICARE

## 2018-02-20 DIAGNOSIS — R73.09 ABNORMAL GLUCOSE: ICD-10-CM

## 2018-02-20 DIAGNOSIS — I10 ESSENTIAL HYPERTENSION, BENIGN: ICD-10-CM

## 2018-02-20 LAB
ALBUMIN SERPL BCP-MCNC: 3.5 G/DL
ALP SERPL-CCNC: 45 U/L
ALT SERPL W/O P-5'-P-CCNC: 18 U/L
ANION GAP SERPL CALC-SCNC: 9 MMOL/L
AST SERPL-CCNC: 21 U/L
BASOPHILS # BLD AUTO: 0.01 K/UL
BASOPHILS NFR BLD: 0.2 %
BILIRUB SERPL-MCNC: 0.4 MG/DL
BUN SERPL-MCNC: 17 MG/DL
CALCIUM SERPL-MCNC: 9.4 MG/DL
CHLORIDE SERPL-SCNC: 103 MMOL/L
CO2 SERPL-SCNC: 28 MMOL/L
CREAT SERPL-MCNC: 0.8 MG/DL
DIFFERENTIAL METHOD: ABNORMAL
EOSINOPHIL # BLD AUTO: 0.1 K/UL
EOSINOPHIL NFR BLD: 1.3 %
ERYTHROCYTE [DISTWIDTH] IN BLOOD BY AUTOMATED COUNT: 12.7 %
EST. GFR  (AFRICAN AMERICAN): >60 ML/MIN/1.73 M^2
EST. GFR  (NON AFRICAN AMERICAN): >60 ML/MIN/1.73 M^2
ESTIMATED AVG GLUCOSE: 108 MG/DL
GLUCOSE SERPL-MCNC: 97 MG/DL
HBA1C MFR BLD HPLC: 5.4 %
HCT VFR BLD AUTO: 35.8 %
HGB BLD-MCNC: 11.6 G/DL
IMM GRANULOCYTES # BLD AUTO: 0.02 K/UL
IMM GRANULOCYTES NFR BLD AUTO: 0.3 %
LYMPHOCYTES # BLD AUTO: 1.1 K/UL
LYMPHOCYTES NFR BLD: 17.6 %
MCH RBC QN AUTO: 29.2 PG
MCHC RBC AUTO-ENTMCNC: 32.4 G/DL
MCV RBC AUTO: 90 FL
MONOCYTES # BLD AUTO: 0.5 K/UL
MONOCYTES NFR BLD: 8.5 %
NEUTROPHILS # BLD AUTO: 4.5 K/UL
NEUTROPHILS NFR BLD: 72.1 %
NRBC BLD-RTO: 0 /100 WBC
PLATELET # BLD AUTO: 336 K/UL
PMV BLD AUTO: 10.1 FL
POTASSIUM SERPL-SCNC: 3.9 MMOL/L
PROT SERPL-MCNC: 7.2 G/DL
RBC # BLD AUTO: 3.97 M/UL
SODIUM SERPL-SCNC: 140 MMOL/L
WBC # BLD AUTO: 6.24 K/UL

## 2018-02-20 PROCEDURE — 85025 COMPLETE CBC W/AUTO DIFF WBC: CPT

## 2018-02-20 PROCEDURE — 83036 HEMOGLOBIN GLYCOSYLATED A1C: CPT

## 2018-02-20 PROCEDURE — 80053 COMPREHEN METABOLIC PANEL: CPT

## 2018-02-20 PROCEDURE — 36415 COLL VENOUS BLD VENIPUNCTURE: CPT | Mod: PO

## 2018-02-23 ENCOUNTER — OFFICE VISIT (OUTPATIENT)
Dept: FAMILY MEDICINE | Facility: CLINIC | Age: 78
End: 2018-02-23
Payer: MEDICARE

## 2018-02-23 VITALS
BODY MASS INDEX: 19.19 KG/M2 | DIASTOLIC BLOOD PRESSURE: 76 MMHG | WEIGHT: 101.63 LBS | HEIGHT: 61 IN | SYSTOLIC BLOOD PRESSURE: 136 MMHG | OXYGEN SATURATION: 98 % | HEART RATE: 93 BPM

## 2018-02-23 DIAGNOSIS — L98.9 DISORDER OF THE SKIN AND SUBCUTANEOUS TISSUE, UNSPECIFIED: ICD-10-CM

## 2018-02-23 DIAGNOSIS — E87.6 HYPOKALEMIA: ICD-10-CM

## 2018-02-23 DIAGNOSIS — I11.9 HYPERTENSIVE LEFT VENTRICULAR HYPERTROPHY, WITHOUT HEART FAILURE: ICD-10-CM

## 2018-02-23 DIAGNOSIS — M81.0 OSTEOPOROSIS, POST-MENOPAUSAL: ICD-10-CM

## 2018-02-23 DIAGNOSIS — D64.9 ANEMIA, UNSPECIFIED TYPE: ICD-10-CM

## 2018-02-23 DIAGNOSIS — R73.09 ABNORMAL GLUCOSE: ICD-10-CM

## 2018-02-23 DIAGNOSIS — I10 ESSENTIAL HYPERTENSION, BENIGN: Primary | ICD-10-CM

## 2018-02-23 DIAGNOSIS — E78.5 HYPERLIPIDEMIA, UNSPECIFIED HYPERLIPIDEMIA TYPE: ICD-10-CM

## 2018-02-23 PROCEDURE — 1159F MED LIST DOCD IN RCRD: CPT | Mod: ,,, | Performed by: FAMILY MEDICINE

## 2018-02-23 PROCEDURE — 99214 OFFICE O/P EST MOD 30 MIN: CPT | Mod: PBBFAC,PO | Performed by: FAMILY MEDICINE

## 2018-02-23 PROCEDURE — 1126F AMNT PAIN NOTED NONE PRSNT: CPT | Mod: ,,, | Performed by: FAMILY MEDICINE

## 2018-02-23 PROCEDURE — 99999 PR PBB SHADOW E&M-EST. PATIENT-LVL IV: CPT | Mod: PBBFAC,,, | Performed by: FAMILY MEDICINE

## 2018-02-23 PROCEDURE — 99214 OFFICE O/P EST MOD 30 MIN: CPT | Mod: S$PBB,,, | Performed by: FAMILY MEDICINE

## 2018-02-23 RX ORDER — VALSARTAN AND HYDROCHLOROTHIAZIDE 320; 25 MG/1; MG/1
1 TABLET, FILM COATED ORAL DAILY
Qty: 90 TABLET | Refills: 3 | Status: SHIPPED | OUTPATIENT
Start: 2018-02-23 | End: 2018-06-11 | Stop reason: SDUPTHER

## 2018-02-23 RX ORDER — AMLODIPINE BESYLATE 10 MG/1
10 TABLET ORAL DAILY
Qty: 90 TABLET | Refills: 3 | Status: SHIPPED | OUTPATIENT
Start: 2018-02-23 | End: 2018-06-11 | Stop reason: SDUPTHER

## 2018-02-23 RX ORDER — ROSUVASTATIN CALCIUM 20 MG/1
20 TABLET, COATED ORAL DAILY
Qty: 90 TABLET | Refills: 3 | Status: SHIPPED | OUTPATIENT
Start: 2018-02-23 | End: 2018-06-11 | Stop reason: SDUPTHER

## 2018-02-23 RX ORDER — POTASSIUM CHLORIDE 20 MEQ/1
20 TABLET, EXTENDED RELEASE ORAL DAILY
Qty: 90 TABLET | Refills: 3 | Status: SHIPPED | OUTPATIENT
Start: 2018-02-23 | End: 2018-06-11 | Stop reason: SDUPTHER

## 2018-02-23 NOTE — PROGRESS NOTES
Subjective:       Patient ID: Trudy Horvath is a 77 y.o. female.    Chief Complaint: Follow-up (4 mos); Hyperlipidemia; and Hypertension  78 yo female with HTN, hyperlipidemia, macular degeneration and pre-diabetes presents for f/u of her chronic conditions.  Hyperlipidemia   This is a chronic problem. The current episode started more than 1 year ago. The problem is controlled. Recent lipid tests were reviewed and are normal. She has no history of chronic renal disease, diabetes, hypothyroidism, liver disease, obesity or nephrotic syndrome. Pertinent negatives include no chest pain, focal sensory loss, focal weakness, leg pain, myalgias or shortness of breath. Current antihyperlipidemic treatment includes statins. The current treatment provides significant improvement of lipids. There are no compliance problems.    Hypertension   This is a chronic problem. The current episode started more than 1 year ago. The problem is unchanged. The problem is controlled. Pertinent negatives include no chest pain, headaches, orthopnea, palpitations, peripheral edema, PND or shortness of breath. Past treatments include calcium channel blockers, angiotensin blockers and diuretics. There are no compliance problems.  There is no history of chronic renal disease.   Pt is receiving Prolia q 6 months for osteoporosis.  Pt would like to be evaluated by Dermatology for some skin lesions.  Results for orders placed or performed in visit on 02/20/18   CBC auto differential   Result Value Ref Range    WBC 6.24 3.90 - 12.70 K/uL    RBC 3.97 (L) 4.00 - 5.40 M/uL    Hemoglobin 11.6 (L) 12.0 - 16.0 g/dL    Hematocrit 35.8 (L) 37.0 - 48.5 %    MCV 90 82 - 98 fL    MCH 29.2 27.0 - 31.0 pg    MCHC 32.4 32.0 - 36.0 g/dL    RDW 12.7 11.5 - 14.5 %    Platelets 336 150 - 350 K/uL    MPV 10.1 9.2 - 12.9 fL    Immature Granulocytes 0.3 0.0 - 0.5 %    Gran # (ANC) 4.5 1.8 - 7.7 K/uL    Immature Grans (Abs) 0.02 0.00 - 0.04 K/uL    Lymph # 1.1 1.0 - 4.8 K/uL     Mono # 0.5 0.3 - 1.0 K/uL    Eos # 0.1 0.0 - 0.5 K/uL    Baso # 0.01 0.00 - 0.20 K/uL    nRBC 0 0 /100 WBC    Gran% 72.1 38.0 - 73.0 %    Lymph% 17.6 (L) 18.0 - 48.0 %    Mono% 8.5 4.0 - 15.0 %    Eosinophil% 1.3 0.0 - 8.0 %    Basophil% 0.2 0.0 - 1.9 %    Differential Method Automated    Comprehensive metabolic panel   Result Value Ref Range    Sodium 140 136 - 145 mmol/L    Potassium 3.9 3.5 - 5.1 mmol/L    Chloride 103 95 - 110 mmol/L    CO2 28 23 - 29 mmol/L    Glucose 97 70 - 110 mg/dL    BUN, Bld 17 8 - 23 mg/dL    Creatinine 0.8 0.5 - 1.4 mg/dL    Calcium 9.4 8.7 - 10.5 mg/dL    Total Protein 7.2 6.0 - 8.4 g/dL    Albumin 3.5 3.5 - 5.2 g/dL    Total Bilirubin 0.4 0.1 - 1.0 mg/dL    Alkaline Phosphatase 45 (L) 55 - 135 U/L    AST 21 10 - 40 U/L    ALT 18 10 - 44 U/L    Anion Gap 9 8 - 16 mmol/L    eGFR if African American >60.0 >60 mL/min/1.73 m^2    eGFR if non African American >60.0 >60 mL/min/1.73 m^2   Hemoglobin A1c   Result Value Ref Range    Hemoglobin A1C 5.4 4.0 - 5.6 %    Estimated Avg Glucose 108 68 - 131 mg/dL     Review of Systems   Constitutional: Negative for chills and fever.   Respiratory: Negative for shortness of breath.    Cardiovascular: Negative for chest pain, palpitations, orthopnea, leg swelling and PND.   Gastrointestinal: Negative for abdominal pain, anal bleeding, blood in stool, nausea and vomiting.   Genitourinary: Negative for dysuria and hematuria.   Musculoskeletal: Negative for myalgias.   Neurological: Negative for focal weakness and headaches.       Objective:      Physical Exam   Constitutional: She appears well-developed and well-nourished.   HENT:   Head: Normocephalic and atraumatic.   Neck: Normal range of motion. Neck supple. No JVD present. No thyromegaly present.   Cardiovascular: Normal rate, regular rhythm and intact distal pulses.    Murmur heard.   Systolic murmur is present with a grade of 3/6   Pulmonary/Chest: Effort normal and breath sounds normal. She has  no wheezes. She has no rales.   Abdominal: Soft. Bowel sounds are normal. She exhibits no mass. There is no tenderness.   Lymphadenopathy:     She has no cervical adenopathy.   Skin: Skin is warm and dry.   Psychiatric: She has a normal mood and affect.   Vitals reviewed.      Assessment:         See plan  Plan:       Trudy was seen today for follow-up, hyperlipidemia and hypertension.    Diagnoses and all orders for this visit:    Essential hypertension, benign: Stable  -     amLODIPine (NORVASC) 10 MG tablet; Take 1 tablet (10 mg total) by mouth once daily.  -     valsartan-hydrochlorothiazide (DIOVAN-HCT) 320-25 mg per tablet; Take 1 tablet by mouth once daily.    Hyperlipidemia, unspecified hyperlipidemia type: Stable  -     rosuvastatin (CRESTOR) 20 MG tablet; Take 1 tablet (20 mg total) by mouth once daily.  -     Lipid panel; Future  -     Comprehensive metabolic panel; Future    Hypertensive left ventricular hypertrophy, without heart failure: Stable  - As per 2/2/15 2D Echo    Abnormal glucose: Resolved    Body mass index (BMI) less than or equal to 19 in adult: Stable    Osteoporosis, post-menopausal: Stable    Hypokalemia: Stable  -     potassium chloride SA (K-DUR,KLOR-CON) 20 MEQ tablet; Take 1 tablet (20 mEq total) by mouth once daily.    Disorder of the skin and subcutaneous tissue, unspecified  -     Ambulatory referral to Dermatology    Anemia, unspecified type  -     CBC auto differential; Future  -     Ferritin; Future    F/U in 4 months.

## 2018-06-05 ENCOUNTER — LAB VISIT (OUTPATIENT)
Dept: LAB | Facility: HOSPITAL | Age: 78
End: 2018-06-05
Attending: FAMILY MEDICINE
Payer: MEDICARE

## 2018-06-05 DIAGNOSIS — E78.5 HYPERLIPIDEMIA, UNSPECIFIED HYPERLIPIDEMIA TYPE: ICD-10-CM

## 2018-06-05 DIAGNOSIS — D64.9 ANEMIA, UNSPECIFIED TYPE: ICD-10-CM

## 2018-06-05 LAB
ALBUMIN SERPL BCP-MCNC: 3.6 G/DL
ALP SERPL-CCNC: 37 U/L
ALT SERPL W/O P-5'-P-CCNC: 19 U/L
ANION GAP SERPL CALC-SCNC: 10 MMOL/L
AST SERPL-CCNC: 21 U/L
BASOPHILS # BLD AUTO: 0.03 K/UL
BASOPHILS NFR BLD: 0.4 %
BILIRUB SERPL-MCNC: 0.4 MG/DL
BUN SERPL-MCNC: 26 MG/DL
CALCIUM SERPL-MCNC: 9.7 MG/DL
CHLORIDE SERPL-SCNC: 100 MMOL/L
CHOLEST SERPL-MCNC: 213 MG/DL
CHOLEST/HDLC SERPL: 3.3 {RATIO}
CO2 SERPL-SCNC: 27 MMOL/L
CREAT SERPL-MCNC: 1 MG/DL
DIFFERENTIAL METHOD: ABNORMAL
EOSINOPHIL # BLD AUTO: 0.1 K/UL
EOSINOPHIL NFR BLD: 0.6 %
ERYTHROCYTE [DISTWIDTH] IN BLOOD BY AUTOMATED COUNT: 13.2 %
EST. GFR  (AFRICAN AMERICAN): >60 ML/MIN/1.73 M^2
EST. GFR  (NON AFRICAN AMERICAN): 53.7 ML/MIN/1.73 M^2
FERRITIN SERPL-MCNC: 109 NG/ML
GLUCOSE SERPL-MCNC: 125 MG/DL
HCT VFR BLD AUTO: 37.3 %
HDLC SERPL-MCNC: 64 MG/DL
HDLC SERPL: 30 %
HGB BLD-MCNC: 12.1 G/DL
IMM GRANULOCYTES # BLD AUTO: 0.05 K/UL
IMM GRANULOCYTES NFR BLD AUTO: 0.6 %
LDLC SERPL CALC-MCNC: 131.8 MG/DL
LYMPHOCYTES # BLD AUTO: 1.4 K/UL
LYMPHOCYTES NFR BLD: 16.4 %
MCH RBC QN AUTO: 29.9 PG
MCHC RBC AUTO-ENTMCNC: 32.4 G/DL
MCV RBC AUTO: 92 FL
MONOCYTES # BLD AUTO: 0.7 K/UL
MONOCYTES NFR BLD: 7.8 %
NEUTROPHILS # BLD AUTO: 6.2 K/UL
NEUTROPHILS NFR BLD: 74.2 %
NONHDLC SERPL-MCNC: 149 MG/DL
NRBC BLD-RTO: 0 /100 WBC
PLATELET # BLD AUTO: 333 K/UL
PMV BLD AUTO: 10 FL
POTASSIUM SERPL-SCNC: 3.7 MMOL/L
PROT SERPL-MCNC: 7 G/DL
RBC # BLD AUTO: 4.05 M/UL
SODIUM SERPL-SCNC: 137 MMOL/L
TRIGL SERPL-MCNC: 86 MG/DL
WBC # BLD AUTO: 8.41 K/UL

## 2018-06-05 PROCEDURE — 80061 LIPID PANEL: CPT

## 2018-06-05 PROCEDURE — 80053 COMPREHEN METABOLIC PANEL: CPT

## 2018-06-05 PROCEDURE — 82728 ASSAY OF FERRITIN: CPT

## 2018-06-05 PROCEDURE — 36415 COLL VENOUS BLD VENIPUNCTURE: CPT | Mod: PO

## 2018-06-05 PROCEDURE — 85025 COMPLETE CBC W/AUTO DIFF WBC: CPT

## 2018-06-11 ENCOUNTER — OFFICE VISIT (OUTPATIENT)
Dept: FAMILY MEDICINE | Facility: CLINIC | Age: 78
End: 2018-06-11
Payer: MEDICARE

## 2018-06-11 VITALS
SYSTOLIC BLOOD PRESSURE: 120 MMHG | HEIGHT: 61 IN | BODY MASS INDEX: 19.48 KG/M2 | WEIGHT: 103.19 LBS | OXYGEN SATURATION: 97 % | HEART RATE: 84 BPM | DIASTOLIC BLOOD PRESSURE: 62 MMHG

## 2018-06-11 DIAGNOSIS — E87.6 HYPOKALEMIA: ICD-10-CM

## 2018-06-11 DIAGNOSIS — I51.89 DIASTOLIC DYSFUNCTION: ICD-10-CM

## 2018-06-11 DIAGNOSIS — I10 ESSENTIAL HYPERTENSION, BENIGN: Primary | ICD-10-CM

## 2018-06-11 DIAGNOSIS — R73.09 ABNORMAL GLUCOSE: ICD-10-CM

## 2018-06-11 DIAGNOSIS — I11.9 HYPERTENSIVE LEFT VENTRICULAR HYPERTROPHY, WITHOUT HEART FAILURE: ICD-10-CM

## 2018-06-11 DIAGNOSIS — E78.5 HYPERLIPIDEMIA, UNSPECIFIED HYPERLIPIDEMIA TYPE: ICD-10-CM

## 2018-06-11 DIAGNOSIS — Z12.11 COLON CANCER SCREENING: ICD-10-CM

## 2018-06-11 DIAGNOSIS — N28.9 ACUTE RENAL INSUFFICIENCY: ICD-10-CM

## 2018-06-11 DIAGNOSIS — M81.0 OSTEOPOROSIS, POST-MENOPAUSAL: ICD-10-CM

## 2018-06-11 PROCEDURE — 99213 OFFICE O/P EST LOW 20 MIN: CPT | Mod: PBBFAC,PO | Performed by: FAMILY MEDICINE

## 2018-06-11 PROCEDURE — 99214 OFFICE O/P EST MOD 30 MIN: CPT | Mod: S$PBB,,, | Performed by: FAMILY MEDICINE

## 2018-06-11 PROCEDURE — 99999 PR PBB SHADOW E&M-EST. PATIENT-LVL III: CPT | Mod: PBBFAC,,, | Performed by: FAMILY MEDICINE

## 2018-06-11 RX ORDER — AMLODIPINE BESYLATE 10 MG/1
10 TABLET ORAL DAILY
Qty: 90 TABLET | Refills: 1 | Status: SHIPPED | OUTPATIENT
Start: 2018-06-11 | End: 2018-12-24 | Stop reason: SDUPTHER

## 2018-06-11 RX ORDER — ROSUVASTATIN CALCIUM 20 MG/1
20 TABLET, COATED ORAL DAILY
Qty: 90 TABLET | Refills: 1 | Status: SHIPPED | OUTPATIENT
Start: 2018-06-11 | End: 2019-01-29 | Stop reason: SDUPTHER

## 2018-06-11 RX ORDER — POTASSIUM CHLORIDE 20 MEQ/1
20 TABLET, EXTENDED RELEASE ORAL DAILY
Qty: 90 TABLET | Refills: 1 | Status: SHIPPED | OUTPATIENT
Start: 2018-06-11 | End: 2019-01-29 | Stop reason: SDUPTHER

## 2018-06-11 RX ORDER — VALSARTAN AND HYDROCHLOROTHIAZIDE 320; 25 MG/1; MG/1
1 TABLET, FILM COATED ORAL DAILY
Qty: 90 TABLET | Refills: 1 | Status: SHIPPED | OUTPATIENT
Start: 2018-06-11 | End: 2018-08-02

## 2018-06-11 NOTE — PROGRESS NOTES
Subjective:       Patient ID: Trudy Horvath is a 77 y.o. female.    Chief Complaint: Follow-up (6 mos); Hypertension; and Hyperlipidemia  78 yo female with HTN, hyperlipidemia, macular degeneration and pre-diabetes presents for f/u chronic conditions. Is seen every 3-4 months by Dr. Wilmer Hand for macular degeneration. Last visit April 2018.  Hypertension   This is a chronic problem. The current episode started more than 1 year ago. The problem is unchanged. Pertinent negatives include no blurred vision, chest pain, headaches, orthopnea, palpitations, peripheral edema, PND or shortness of breath. Past treatments include calcium channel blockers, diuretics and angiotensin blockers. There are no compliance problems.  There is no history of chronic renal disease.   Hyperlipidemia   This is a chronic problem. The current episode started more than 1 year ago. The problem is controlled. Recent lipid tests were reviewed and are normal. She has no history of chronic renal disease, hypothyroidism, liver disease, obesity or nephrotic syndrome. Pertinent negatives include no chest pain, focal sensory loss, focal weakness, leg pain, myalgias or shortness of breath. Current antihyperlipidemic treatment includes statins. The current treatment provides significant improvement of lipids. There are no compliance problems.    Pt notes fatigue. Had 2D Echo 2015 which showed LVH and diastolic dysfunction.  Results for orders placed or performed in visit on 06/05/18   Lipid panel   Result Value Ref Range    Cholesterol 213 (H) 120 - 199 mg/dL    Triglycerides 86 30 - 150 mg/dL    HDL 64 40 - 75 mg/dL    LDL Cholesterol 131.8 63.0 - 159.0 mg/dL    HDL/Chol Ratio 30.0 20.0 - 50.0 %    Total Cholesterol/HDL Ratio 3.3 2.0 - 5.0    Non-HDL Cholesterol 149 mg/dL   Comprehensive metabolic panel   Result Value Ref Range    Sodium 137 136 - 145 mmol/L    Potassium 3.7 3.5 - 5.1 mmol/L    Chloride 100 95 - 110 mmol/L    CO2 27 23 - 29 mmol/L     Glucose 125 (H) 70 - 110 mg/dL    BUN, Bld 26 (H) 8 - 23 mg/dL    Creatinine 1.0 0.5 - 1.4 mg/dL    Calcium 9.7 8.7 - 10.5 mg/dL    Total Protein 7.0 6.0 - 8.4 g/dL    Albumin 3.6 3.5 - 5.2 g/dL    Total Bilirubin 0.4 0.1 - 1.0 mg/dL    Alkaline Phosphatase 37 (L) 55 - 135 U/L    AST 21 10 - 40 U/L    ALT 19 10 - 44 U/L    Anion Gap 10 8 - 16 mmol/L    eGFR if African American >60.0 >60 mL/min/1.73 m^2    eGFR if non  53.7 (A) >60 mL/min/1.73 m^2   CBC auto differential   Result Value Ref Range    WBC 8.41 3.90 - 12.70 K/uL    RBC 4.05 4.00 - 5.40 M/uL    Hemoglobin 12.1 12.0 - 16.0 g/dL    Hematocrit 37.3 37.0 - 48.5 %    MCV 92 82 - 98 fL    MCH 29.9 27.0 - 31.0 pg    MCHC 32.4 32.0 - 36.0 g/dL    RDW 13.2 11.5 - 14.5 %    Platelets 333 150 - 350 K/uL    MPV 10.0 9.2 - 12.9 fL    Immature Granulocytes 0.6 (H) 0.0 - 0.5 %    Gran # (ANC) 6.2 1.8 - 7.7 K/uL    Immature Grans (Abs) 0.05 (H) 0.00 - 0.04 K/uL    Lymph # 1.4 1.0 - 4.8 K/uL    Mono # 0.7 0.3 - 1.0 K/uL    Eos # 0.1 0.0 - 0.5 K/uL    Baso # 0.03 0.00 - 0.20 K/uL    nRBC 0 0 /100 WBC    Gran% 74.2 (H) 38.0 - 73.0 %    Lymph% 16.4 (L) 18.0 - 48.0 %    Mono% 7.8 4.0 - 15.0 %    Eosinophil% 0.6 0.0 - 8.0 %    Basophil% 0.4 0.0 - 1.9 %    Differential Method Automated    Ferritin   Result Value Ref Range    Ferritin 109 20.0 - 300.0 ng/mL     Review of Systems   Eyes: Negative for blurred vision.   Respiratory: Negative for shortness of breath.    Cardiovascular: Negative for chest pain, palpitations, orthopnea, leg swelling and PND.   Gastrointestinal: Positive for constipation. Negative for abdominal pain, anal bleeding, blood in stool, diarrhea, nausea and vomiting.   Genitourinary: Negative for dysuria and hematuria.   Musculoskeletal: Negative for myalgias.   Neurological: Negative for focal weakness and headaches.       Objective:      Physical Exam   Constitutional: She appears well-developed and well-nourished. No distress.   HENT:    Head: Normocephalic and atraumatic.   Neck: Normal range of motion. Neck supple. No JVD present. No thyromegaly present.   Cardiovascular: Normal rate, regular rhythm and normal heart sounds.    Pulmonary/Chest: Effort normal and breath sounds normal. She has no wheezes. She has no rales.   Abdominal: Soft. Bowel sounds are normal. She exhibits no mass. There is no tenderness.   Lymphadenopathy:     She has no cervical adenopathy.   Skin: Skin is warm and dry. She is not diaphoretic.   Vitals reviewed.      Assessment:         See plan  Plan:       Trudy was seen today for follow-up, hypertension and hyperlipidemia.    Diagnoses and all orders for this visit:    Essential hypertension, benign: Stable  -     amLODIPine (NORVASC) 10 MG tablet; Take 1 tablet (10 mg total) by mouth once daily.  -     valsartan-hydrochlorothiazide (DIOVAN-HCT) 320-25 mg per tablet; Take 1 tablet by mouth once daily.    Hypertensive left ventricular hypertrophy, without heart failure  -     2D Echo w/ Color Flow Doppler; Future    Hyperlipidemia, unspecified hyperlipidemia type: Stable  -     Cancel: Lipid panel; Future  -     Comprehensive metabolic panel; Future  -     rosuvastatin (CRESTOR) 20 MG tablet; Take 1 tablet (20 mg total) by mouth once daily.    Diastolic dysfunction  -     2D Echo w/ Color Flow Doppler; Future    Abnormal glucose  Pt advised to decrease intake of white bread, white rice, corn, potatoes, pasta and sugar.  -     Hemoglobin A1c; Future    Body mass index (BMI) less than or equal to 19 in adult    Osteoporosis, post-menopausal  - Last Prolia 11/28/17. Due for Prolia injection    Colon cancer screening  -     Fecal Immunochemical Test (iFOBT); Future    Hypokalemia: Stable  -     potassium chloride SA (K-DUR,KLOR-CON) 20 MEQ tablet; Take 1 tablet (20 mEq total) by mouth once daily.    Acute renal insufficiency  - Decreased gFR. Pt advised to avoid NSAIDs. Pt advised to increase her water intake.    F/U in 3  months.

## 2018-06-18 ENCOUNTER — INFUSION (OUTPATIENT)
Dept: INFUSION THERAPY | Facility: HOSPITAL | Age: 78
End: 2018-06-18
Attending: FAMILY MEDICINE
Payer: MEDICARE

## 2018-06-18 ENCOUNTER — HOSPITAL ENCOUNTER (OUTPATIENT)
Dept: CARDIOLOGY | Facility: HOSPITAL | Age: 78
Discharge: HOME | End: 2018-06-18
Attending: FAMILY MEDICINE
Payer: MEDICARE

## 2018-06-18 ENCOUNTER — TELEPHONE (OUTPATIENT)
Dept: FAMILY MEDICINE | Facility: CLINIC | Age: 78
End: 2018-06-18

## 2018-06-18 VITALS — BODY MASS INDEX: 19.48 KG/M2 | WEIGHT: 103.19 LBS | HEIGHT: 61 IN

## 2018-06-18 DIAGNOSIS — I11.9 HYPERTENSIVE LEFT VENTRICULAR HYPERTROPHY, WITHOUT HEART FAILURE: ICD-10-CM

## 2018-06-18 DIAGNOSIS — M81.0 OSTEOPOROSIS, POST-MENOPAUSAL: Primary | ICD-10-CM

## 2018-06-18 DIAGNOSIS — I51.89 DIASTOLIC DYSFUNCTION: ICD-10-CM

## 2018-06-18 LAB
DIASTOLIC DYSFUNCTION: YES
ESTIMATED PA SYSTOLIC PRESSURE: 40.45
MITRAL VALVE MOBILITY: NORMAL
RETIRED EF AND QEF - SEE NOTES: 65 (ref 55–65)
TRICUSPID VALVE REGURGITATION: ABNORMAL

## 2018-06-18 PROCEDURE — 63600175 PHARM REV CODE 636 W HCPCS: Mod: JG | Performed by: FAMILY MEDICINE

## 2018-06-18 PROCEDURE — 93306 TTE W/DOPPLER COMPLETE: CPT | Mod: 26,,, | Performed by: INTERNAL MEDICINE

## 2018-06-18 PROCEDURE — 93306 TTE W/DOPPLER COMPLETE: CPT

## 2018-06-18 PROCEDURE — 96372 THER/PROPH/DIAG INJ SC/IM: CPT

## 2018-06-18 RX ADMIN — DENOSUMAB 60 MG: 60 INJECTION SUBCUTANEOUS at 09:06

## 2018-06-18 NOTE — TELEPHONE ENCOUNTER
----- Message from Aide Villagomez RN sent at 6/18/2018 10:53 AM CDT -----  Regarding: Ca and Vit D  Good morning,    Ms. Horvath came today to get her Prolia injection.  It is recommended that patients receiving Prolia take Ca and Vit AD supplements, however she told me she is not currently taking these supplements.    Please advise if you will be prescribing Ca and Vit D to this patient or if there is a reason you do not want her to take these supplements so I can make a note in her chart.    Thank you!  Aide Villagomez RN

## 2018-06-18 NOTE — NURSING
Tolerated Prolia injection well. Pt has received Prolia before with no adverse reaction. Instructed to increase water over next 48 hours, to make sure dentist knows she is receiving Prolia and reviewed possible side effects. Provided pt with a print out on Prolia also. Pt reports she does not take Ca and Vit D supplements so I will notify Dr. Guzman that it is recommended when pt is receiving Prolia. Discharged home in NAD> Discharged home with next appointment scheduled.

## 2018-07-31 ENCOUNTER — TELEPHONE (OUTPATIENT)
Dept: FAMILY MEDICINE | Facility: CLINIC | Age: 78
End: 2018-07-31

## 2018-07-31 DIAGNOSIS — Z12.39 ENCOUNTER FOR BREAST CANCER SCREENING OTHER THAN MAMMOGRAM: Primary | ICD-10-CM

## 2018-07-31 DIAGNOSIS — Z12.31 ENCOUNTER FOR SCREENING MAMMOGRAM FOR MALIGNANT NEOPLASM OF BREAST: ICD-10-CM

## 2018-07-31 NOTE — TELEPHONE ENCOUNTER
----- Message from Li Kendall sent at 7/31/2018 11:01 AM CDT -----  Contact: [phone]/self  Pt requesting to speak with you concerning her Mammo orders   Please call and advise

## 2018-08-02 ENCOUNTER — TELEPHONE (OUTPATIENT)
Dept: FAMILY MEDICINE | Facility: CLINIC | Age: 78
End: 2018-08-02

## 2018-08-02 DIAGNOSIS — I10 ESSENTIAL HYPERTENSION, BENIGN: Primary | ICD-10-CM

## 2018-08-02 RX ORDER — IRBESARTAN AND HYDROCHLOROTHIAZIDE 300; 12.5 MG/1; MG/1
1 TABLET, FILM COATED ORAL DAILY
Qty: 90 TABLET | Refills: 3 | Status: SHIPPED | OUTPATIENT
Start: 2018-08-02 | End: 2019-02-05 | Stop reason: SDUPTHER

## 2018-08-02 NOTE — TELEPHONE ENCOUNTER
Left message for patient to call office back if she need more information about her new BP medication.

## 2018-08-02 NOTE — TELEPHONE ENCOUNTER
----- Message from Hilda Joya MA sent at 7/31/2018  4:15 PM CDT -----  Contact: [phone] / self   Mammogram order was faxed to [facility].  Patient also concern on continuing taking Diovan or if Dr Guzman will have to change this medication.  Please advised.    ----- Message -----  From: Ely Foley  Sent: 7/31/2018   3:55 PM  To: Thomas Lombardi A Staff    Patient states she has her mammogram done at [facility], requests her orders faxed over there for scheduling. Patient also requests to speak with you regarding her Divan, states she is not sure if Dr. Guzman wants her to continue taking it.     Thank you

## 2018-08-14 ENCOUNTER — LAB VISIT (OUTPATIENT)
Dept: LAB | Facility: HOSPITAL | Age: 78
End: 2018-08-14
Attending: FAMILY MEDICINE
Payer: MEDICARE

## 2018-08-14 DIAGNOSIS — Z12.11 COLON CANCER SCREENING: ICD-10-CM

## 2018-08-14 LAB — HEMOCCULT STL QL IA: NEGATIVE

## 2018-08-14 PROCEDURE — 82274 ASSAY TEST FOR BLOOD FECAL: CPT

## 2018-09-04 ENCOUNTER — LAB VISIT (OUTPATIENT)
Dept: LAB | Facility: HOSPITAL | Age: 78
End: 2018-09-04
Attending: FAMILY MEDICINE
Payer: MEDICARE

## 2018-09-04 DIAGNOSIS — E78.5 HYPERLIPIDEMIA, UNSPECIFIED HYPERLIPIDEMIA TYPE: ICD-10-CM

## 2018-09-04 DIAGNOSIS — R73.09 ABNORMAL GLUCOSE: ICD-10-CM

## 2018-09-04 LAB
ALBUMIN SERPL BCP-MCNC: 3.8 G/DL
ALP SERPL-CCNC: 37 U/L
ALT SERPL W/O P-5'-P-CCNC: 13 U/L
ANION GAP SERPL CALC-SCNC: 9 MMOL/L
AST SERPL-CCNC: 18 U/L
BILIRUB SERPL-MCNC: 0.4 MG/DL
BUN SERPL-MCNC: 22 MG/DL
CALCIUM SERPL-MCNC: 9.1 MG/DL
CHLORIDE SERPL-SCNC: 103 MMOL/L
CO2 SERPL-SCNC: 29 MMOL/L
CREAT SERPL-MCNC: 0.8 MG/DL
EST. GFR  (AFRICAN AMERICAN): >60 ML/MIN/1.73 M^2
EST. GFR  (NON AFRICAN AMERICAN): >60 ML/MIN/1.73 M^2
ESTIMATED AVG GLUCOSE: 108 MG/DL
GLUCOSE SERPL-MCNC: 99 MG/DL
HBA1C MFR BLD HPLC: 5.4 %
POTASSIUM SERPL-SCNC: 3.6 MMOL/L
PROT SERPL-MCNC: 7.1 G/DL
SODIUM SERPL-SCNC: 141 MMOL/L

## 2018-09-04 PROCEDURE — 36415 COLL VENOUS BLD VENIPUNCTURE: CPT | Mod: PO

## 2018-09-04 PROCEDURE — 83036 HEMOGLOBIN GLYCOSYLATED A1C: CPT

## 2018-09-04 PROCEDURE — 80053 COMPREHEN METABOLIC PANEL: CPT

## 2018-09-21 ENCOUNTER — OFFICE VISIT (OUTPATIENT)
Dept: FAMILY MEDICINE | Facility: CLINIC | Age: 78
End: 2018-09-21
Payer: MEDICARE

## 2018-09-21 VITALS
WEIGHT: 104.75 LBS | DIASTOLIC BLOOD PRESSURE: 80 MMHG | BODY MASS INDEX: 19.78 KG/M2 | HEIGHT: 61 IN | OXYGEN SATURATION: 97 % | SYSTOLIC BLOOD PRESSURE: 142 MMHG | HEART RATE: 93 BPM

## 2018-09-21 DIAGNOSIS — E78.00 PURE HYPERCHOLESTEROLEMIA: ICD-10-CM

## 2018-09-21 DIAGNOSIS — I51.89 DIASTOLIC DYSFUNCTION: ICD-10-CM

## 2018-09-21 DIAGNOSIS — I11.9 HYPERTENSIVE LEFT VENTRICULAR HYPERTROPHY, WITHOUT HEART FAILURE: ICD-10-CM

## 2018-09-21 DIAGNOSIS — Z23 NEED FOR INFLUENZA VACCINATION: ICD-10-CM

## 2018-09-21 DIAGNOSIS — E87.6 HYPOKALEMIA: ICD-10-CM

## 2018-09-21 DIAGNOSIS — I10 ESSENTIAL HYPERTENSION, BENIGN: Primary | ICD-10-CM

## 2018-09-21 PROBLEM — N28.9 ACUTE RENAL INSUFFICIENCY: Status: RESOLVED | Noted: 2018-06-11 | Resolved: 2018-09-21

## 2018-09-21 PROCEDURE — 90662 IIV NO PRSV INCREASED AG IM: CPT | Mod: PBBFAC,PO

## 2018-09-21 PROCEDURE — 99213 OFFICE O/P EST LOW 20 MIN: CPT | Mod: PBBFAC,PO,25 | Performed by: FAMILY MEDICINE

## 2018-09-21 PROCEDURE — 99214 OFFICE O/P EST MOD 30 MIN: CPT | Mod: S$PBB,,, | Performed by: FAMILY MEDICINE

## 2018-09-21 PROCEDURE — 99999 PR PBB SHADOW E&M-EST. PATIENT-LVL III: CPT | Mod: PBBFAC,,, | Performed by: FAMILY MEDICINE

## 2018-09-21 RX ORDER — CARVEDILOL 3.12 MG/1
3.12 TABLET ORAL 2 TIMES DAILY WITH MEALS
Qty: 180 TABLET | Refills: 1 | Status: SHIPPED | OUTPATIENT
Start: 2018-09-21 | End: 2019-03-02 | Stop reason: SDUPTHER

## 2018-09-21 NOTE — PROGRESS NOTES
Subjective:       Patient ID: Trudy Horvath is a 77 y.o. female.    Chief Complaint: Follow-up; Hypertension; and Hyperlipidemia  78 yo female with HTN, hyperlipidemia, macular degeneration and pre-diabetes presents for f/u chronic conditions. Is seen every 3-4 months by Dr. Wilmer Hand for macular degeneration. Last visit April August 2018. Pt is stressed about problems in her home which need repairs. Ambulatory BP readings have been controlled at 120/80.  Hypertension   This is a chronic problem. The current episode started more than 1 year ago. The problem is unchanged. Pertinent negatives include no blurred vision, chest pain, headaches, orthopnea, palpitations, peripheral edema, PND or shortness of breath. Past treatments include calcium channel blockers, angiotensin blockers and diuretics. There are no compliance problems.  There is no history of chronic renal disease.   Hyperlipidemia   This is a chronic problem. The current episode started more than 1 year ago. The problem is controlled. Recent lipid tests were reviewed and are normal. She has no history of chronic renal disease, diabetes, hypothyroidism, liver disease, obesity or nephrotic syndrome. Pertinent negatives include no chest pain, focal sensory loss, focal weakness, leg pain, myalgias or shortness of breath. Current antihyperlipidemic treatment includes statins. The current treatment provides significant improvement of lipids. There are no compliance problems.      Results for orders placed or performed in visit on 09/04/18   Comprehensive metabolic panel   Result Value Ref Range    Sodium 141 136 - 145 mmol/L    Potassium 3.6 3.5 - 5.1 mmol/L    Chloride 103 95 - 110 mmol/L    CO2 29 23 - 29 mmol/L    Glucose 99 70 - 110 mg/dL    BUN, Bld 22 8 - 23 mg/dL    Creatinine 0.8 0.5 - 1.4 mg/dL    Calcium 9.1 8.7 - 10.5 mg/dL    Total Protein 7.1 6.0 - 8.4 g/dL    Albumin 3.8 3.5 - 5.2 g/dL    Total Bilirubin 0.4 0.1 - 1.0 mg/dL    Alkaline Phosphatase 37  (L) 55 - 135 U/L    AST 18 10 - 40 U/L    ALT 13 10 - 44 U/L    Anion Gap 9 8 - 16 mmol/L    eGFR if African American >60.0 >60 mL/min/1.73 m^2    eGFR if non African American >60.0 >60 mL/min/1.73 m^2   Hemoglobin A1c   Result Value Ref Range    Hemoglobin A1C 5.4 4.0 - 5.6 %    Estimated Avg Glucose 108 68 - 131 mg/dL     Review of Systems   Constitutional: Negative for chills and fever.   Eyes: Negative for blurred vision.   Respiratory: Negative for shortness of breath.    Cardiovascular: Negative for chest pain, palpitations, orthopnea, leg swelling and PND.   Gastrointestinal: Negative for abdominal pain, anal bleeding, blood in stool, nausea and vomiting.   Genitourinary: Negative for hematuria.   Musculoskeletal: Negative for myalgias.   Neurological: Negative for focal weakness and headaches.       Objective:      Physical Exam   Constitutional: She appears well-developed and well-nourished. No distress.   HENT:   Head: Normocephalic and atraumatic.   Neck: Normal range of motion. Neck supple. No JVD present. No thyromegaly present.   Cardiovascular: Normal rate and regular rhythm.   Murmur heard.   Systolic murmur is present with a grade of 3/6.  Pulmonary/Chest: Effort normal and breath sounds normal. She has no wheezes. She has no rales.   Abdominal: Soft. Bowel sounds are normal.   Lymphadenopathy:     She has no cervical adenopathy.   Skin: Skin is warm and dry. She is not diaphoretic.   Vitals reviewed.      Assessment:         See plan  Plan:       Trudy was seen today for follow-up, hypertension and hyperlipidemia.    Diagnoses and all orders for this visit:    Essential hypertension, benign: Uncontrolled  -     Start carvedilol (COREG) 3.125 MG tablet; Take 1 tablet (3.125 mg total) by mouth 2 (two) times daily with meals.    Pure hypercholesterolemia: Stable    Hypertensive left ventricular hypertrophy, without heart failure: Stable  -     carvedilol (COREG) 3.125 MG tablet; Take 1 tablet (3.125 mg  total) by mouth 2 (two) times daily with meals.    Diastolic dysfunction: Stable    Body mass index (BMI) less than or equal to 19 in adult    Hypokalemia: Stable    Need for influenza vaccination  -     Influenza - High Dose (65+) (PF) (IM)    F/U in 4 weeks to recheck BP. BP goal is less than 140/90.

## 2018-10-03 ENCOUNTER — TELEPHONE (OUTPATIENT)
Dept: FAMILY MEDICINE | Facility: CLINIC | Age: 78
End: 2018-10-03

## 2018-10-03 NOTE — TELEPHONE ENCOUNTER
----- Message from Giselle Khan sent at 10/3/2018 10:37 AM CDT -----  Contact: self/[phone]  Patient called to speak with July about the latest blood pressure medication that the doctor prescribed to her and how it makes her feel.    Please call and advise.

## 2018-10-03 NOTE — TELEPHONE ENCOUNTER
Patient states having dizziness spell while taking Carvedilol.  Patient's dizziness resolved after stopping this medication.  As per Dr Guzman patient should come in on Monday 10/08/18 to discuss new meds for BP.

## 2018-10-08 ENCOUNTER — OFFICE VISIT (OUTPATIENT)
Dept: FAMILY MEDICINE | Facility: CLINIC | Age: 78
End: 2018-10-08
Payer: MEDICARE

## 2018-10-08 VITALS
WEIGHT: 103.63 LBS | SYSTOLIC BLOOD PRESSURE: 134 MMHG | DIASTOLIC BLOOD PRESSURE: 76 MMHG | HEIGHT: 61 IN | BODY MASS INDEX: 19.57 KG/M2 | OXYGEN SATURATION: 98 % | HEART RATE: 95 BPM

## 2018-10-08 DIAGNOSIS — E78.00 PURE HYPERCHOLESTEROLEMIA: ICD-10-CM

## 2018-10-08 DIAGNOSIS — I11.9 HYPERTENSIVE LEFT VENTRICULAR HYPERTROPHY, WITHOUT HEART FAILURE: ICD-10-CM

## 2018-10-08 DIAGNOSIS — I10 ESSENTIAL HYPERTENSION, BENIGN: Primary | ICD-10-CM

## 2018-10-08 PROCEDURE — 99999 PR PBB SHADOW E&M-EST. PATIENT-LVL III: CPT | Mod: PBBFAC,,, | Performed by: FAMILY MEDICINE

## 2018-10-08 PROCEDURE — 99214 OFFICE O/P EST MOD 30 MIN: CPT | Mod: S$PBB,,, | Performed by: FAMILY MEDICINE

## 2018-10-08 PROCEDURE — 99213 OFFICE O/P EST LOW 20 MIN: CPT | Mod: PBBFAC,PO | Performed by: FAMILY MEDICINE

## 2018-10-08 NOTE — PROGRESS NOTES
Subjective:       Patient ID: Trudy Horvath is a 78 y.o. female.    Chief Complaint: Follow-up (4 wks recheck BP) and Hypertension    HPI   79 yo female with HTN, hyperlipidemia, LVH, macular degeneration and pre-diabetes presents for f/u uncontrolled HTN. Pt started on Carvedilol 3.125 mg bid at last visit on 9/21/18. Pt stopped this medication due to dizziness. Dizziness resolved off Carvedilol.  Had a large birthday party to celebrate her 78th birthday. Pt will travel to the [facility] at the end of the month to see the changing of the leaving.  Review of Systems   Constitutional: Negative for chills and fever.   Respiratory: Negative for shortness of breath.    Cardiovascular: Negative for chest pain, palpitations and leg swelling.   Gastrointestinal: Negative for abdominal pain, nausea and vomiting.       Objective:      Physical Exam   Constitutional: She appears well-developed and well-nourished. No distress.   HENT:   Head: Normocephalic and atraumatic.   Neck: Normal range of motion. Neck supple. No JVD present. No thyromegaly present.   Cardiovascular: Normal rate and regular rhythm.   Murmur heard.   Systolic murmur is present with a grade of 3/6.  Pulmonary/Chest: Effort normal and breath sounds normal. She has no wheezes. She has no rales.   Abdominal: Soft. Bowel sounds are normal. She exhibits no mass. There is no tenderness.   Lymphadenopathy:     She has no cervical adenopathy.   Skin: Skin is warm and dry. She is not diaphoretic.   Vitals reviewed.      Assessment:       See plan  Plan:       Trudy was seen today for follow-up and hypertension.    Diagnoses and all orders for this visit:    Essential hypertension, benign: Stable    Hypertensive left ventricular hypertrophy, without heart failure: Stable    Body mass index (BMI) less than or equal to 19 in adult: Stable    Pure hypercholesterolemia  -     Lipid panel; Future  -     Comprehensive metabolic panel; Future    F/U in 4 months.

## 2018-11-23 ENCOUNTER — DOCUMENTATION ONLY (OUTPATIENT)
Dept: FAMILY MEDICINE | Facility: CLINIC | Age: 78
End: 2018-11-23

## 2018-11-23 NOTE — PROGRESS NOTES
Seen by Dr. Bardales on 10/16/18 for recurrence of low back pain. Pt started on Diclofenac 75 mg bid.

## 2018-12-04 ENCOUNTER — TELEPHONE (OUTPATIENT)
Dept: FAMILY MEDICINE | Facility: CLINIC | Age: 78
End: 2018-12-04

## 2018-12-04 NOTE — TELEPHONE ENCOUNTER
Patient will be calling [facility] to verify why she do not have Ibersartan -HCTZ 300-12.5 mg since August 2018.  Patient was still taking Valsartan- HCTZ.

## 2018-12-04 NOTE — TELEPHONE ENCOUNTER
----- Message from Kitty Irizarry sent at 12/4/2018  8:29 AM CST -----  Contact: [phone]  Pt its requesting to speak with the nurse in regarding questions about the discontinued of her blood pressure medication  . Please advise

## 2018-12-24 DIAGNOSIS — I10 ESSENTIAL HYPERTENSION, BENIGN: ICD-10-CM

## 2018-12-24 RX ORDER — AMLODIPINE BESYLATE 10 MG/1
TABLET ORAL
Qty: 90 TABLET | Refills: 1 | Status: SHIPPED | OUTPATIENT
Start: 2018-12-24 | End: 2019-02-05 | Stop reason: SDUPTHER

## 2019-01-17 ENCOUNTER — PES CALL (OUTPATIENT)
Dept: ADMINISTRATIVE | Facility: CLINIC | Age: 79
End: 2019-01-17

## 2019-01-29 DIAGNOSIS — E78.5 HYPERLIPIDEMIA, UNSPECIFIED HYPERLIPIDEMIA TYPE: ICD-10-CM

## 2019-01-29 DIAGNOSIS — E87.6 HYPOKALEMIA: ICD-10-CM

## 2019-01-29 RX ORDER — ROSUVASTATIN CALCIUM 20 MG/1
TABLET, COATED ORAL
Qty: 90 TABLET | Refills: 1 | Status: SHIPPED | OUTPATIENT
Start: 2019-01-29 | End: 2019-02-05 | Stop reason: SDUPTHER

## 2019-01-29 RX ORDER — POTASSIUM CHLORIDE 1500 MG/1
TABLET, EXTENDED RELEASE ORAL
Qty: 90 TABLET | Refills: 1 | Status: SHIPPED | OUTPATIENT
Start: 2019-01-29 | End: 2019-02-05 | Stop reason: SDUPTHER

## 2019-01-30 ENCOUNTER — LAB VISIT (OUTPATIENT)
Dept: LAB | Facility: HOSPITAL | Age: 79
End: 2019-01-30
Attending: FAMILY MEDICINE
Payer: MEDICARE

## 2019-01-30 DIAGNOSIS — E78.00 PURE HYPERCHOLESTEROLEMIA: ICD-10-CM

## 2019-01-30 LAB
ALBUMIN SERPL BCP-MCNC: 3.6 G/DL
ALP SERPL-CCNC: 41 U/L
ALT SERPL W/O P-5'-P-CCNC: 18 U/L
ANION GAP SERPL CALC-SCNC: 9 MMOL/L
AST SERPL-CCNC: 22 U/L
BILIRUB SERPL-MCNC: 0.5 MG/DL
BUN SERPL-MCNC: 22 MG/DL
CALCIUM SERPL-MCNC: 11 MG/DL
CHLORIDE SERPL-SCNC: 100 MMOL/L
CHOLEST SERPL-MCNC: 421 MG/DL
CHOLEST/HDLC SERPL: 6.8 {RATIO}
CO2 SERPL-SCNC: 31 MMOL/L
CREAT SERPL-MCNC: 1 MG/DL
EST. GFR  (AFRICAN AMERICAN): >60 ML/MIN/1.73 M^2
EST. GFR  (NON AFRICAN AMERICAN): 53.3 ML/MIN/1.73 M^2
GLUCOSE SERPL-MCNC: 136 MG/DL
HDLC SERPL-MCNC: 62 MG/DL
HDLC SERPL: 14.7 %
LDLC SERPL CALC-MCNC: 316.8 MG/DL
NONHDLC SERPL-MCNC: 359 MG/DL
POTASSIUM SERPL-SCNC: 3.2 MMOL/L
PROT SERPL-MCNC: 7.5 G/DL
SODIUM SERPL-SCNC: 140 MMOL/L
TRIGL SERPL-MCNC: 211 MG/DL

## 2019-01-30 PROCEDURE — 80053 COMPREHEN METABOLIC PANEL: CPT

## 2019-01-30 PROCEDURE — 80061 LIPID PANEL: CPT

## 2019-01-30 PROCEDURE — 36415 COLL VENOUS BLD VENIPUNCTURE: CPT | Mod: PO

## 2019-01-31 ENCOUNTER — EXTERNAL CHRONIC CARE MANAGEMENT (OUTPATIENT)
Dept: PRIMARY CARE CLINIC | Facility: CLINIC | Age: 79
End: 2019-01-31
Payer: MEDICARE

## 2019-01-31 PROCEDURE — 99490 PR CHRONIC CARE MGMT, 1ST 20 MIN: ICD-10-PCS | Mod: S$PBB,,, | Performed by: FAMILY MEDICINE

## 2019-01-31 PROCEDURE — 99490 CHRNC CARE MGMT STAFF 1ST 20: CPT | Mod: S$PBB,,, | Performed by: FAMILY MEDICINE

## 2019-01-31 PROCEDURE — 99490 CHRNC CARE MGMT STAFF 1ST 20: CPT | Mod: PBBFAC,PO | Performed by: FAMILY MEDICINE

## 2019-02-05 ENCOUNTER — OFFICE VISIT (OUTPATIENT)
Dept: FAMILY MEDICINE | Facility: CLINIC | Age: 79
End: 2019-02-05
Payer: MEDICARE

## 2019-02-05 ENCOUNTER — TELEPHONE (OUTPATIENT)
Dept: FAMILY MEDICINE | Facility: CLINIC | Age: 79
End: 2019-02-05

## 2019-02-05 VITALS
DIASTOLIC BLOOD PRESSURE: 80 MMHG | HEIGHT: 61 IN | WEIGHT: 101.44 LBS | HEART RATE: 91 BPM | OXYGEN SATURATION: 98 % | SYSTOLIC BLOOD PRESSURE: 130 MMHG | BODY MASS INDEX: 19.15 KG/M2

## 2019-02-05 DIAGNOSIS — I10 ESSENTIAL HYPERTENSION, BENIGN: ICD-10-CM

## 2019-02-05 DIAGNOSIS — E78.5 HYPERLIPIDEMIA, UNSPECIFIED HYPERLIPIDEMIA TYPE: ICD-10-CM

## 2019-02-05 DIAGNOSIS — E78.00 PURE HYPERCHOLESTEROLEMIA: ICD-10-CM

## 2019-02-05 DIAGNOSIS — E83.52 HYPERCALCEMIA: ICD-10-CM

## 2019-02-05 DIAGNOSIS — E87.6 HYPOKALEMIA: ICD-10-CM

## 2019-02-05 DIAGNOSIS — M81.0 OSTEOPOROSIS, POST-MENOPAUSAL: ICD-10-CM

## 2019-02-05 DIAGNOSIS — I11.9 HYPERTENSIVE LEFT VENTRICULAR HYPERTROPHY, WITHOUT HEART FAILURE: ICD-10-CM

## 2019-02-05 DIAGNOSIS — I10 ESSENTIAL HYPERTENSION, BENIGN: Primary | ICD-10-CM

## 2019-02-05 PROCEDURE — 99213 OFFICE O/P EST LOW 20 MIN: CPT | Mod: PBBFAC,PO | Performed by: FAMILY MEDICINE

## 2019-02-05 PROCEDURE — 99214 OFFICE O/P EST MOD 30 MIN: CPT | Mod: S$PBB,,, | Performed by: FAMILY MEDICINE

## 2019-02-05 PROCEDURE — 99999 PR PBB SHADOW E&M-EST. PATIENT-LVL III: ICD-10-PCS | Mod: PBBFAC,,, | Performed by: FAMILY MEDICINE

## 2019-02-05 PROCEDURE — 99999 PR PBB SHADOW E&M-EST. PATIENT-LVL III: CPT | Mod: PBBFAC,,, | Performed by: FAMILY MEDICINE

## 2019-02-05 PROCEDURE — 99214 PR OFFICE/OUTPT VISIT, EST, LEVL IV, 30-39 MIN: ICD-10-PCS | Mod: S$PBB,,, | Performed by: FAMILY MEDICINE

## 2019-02-05 RX ORDER — CARVEDILOL 3.12 MG/1
3.12 TABLET ORAL 2 TIMES DAILY
Qty: 180 TABLET | Refills: 1 | Status: SHIPPED | OUTPATIENT
Start: 2019-02-05 | End: 2019-02-05 | Stop reason: SDUPTHER

## 2019-02-05 RX ORDER — POTASSIUM CHLORIDE 20 MEQ/1
20 TABLET, EXTENDED RELEASE ORAL DAILY
Qty: 90 TABLET | Refills: 1 | Status: SHIPPED | OUTPATIENT
Start: 2019-02-05 | End: 2019-07-28 | Stop reason: SDUPTHER

## 2019-02-05 RX ORDER — IRBESARTAN AND HYDROCHLOROTHIAZIDE 300; 12.5 MG/1; MG/1
1 TABLET, FILM COATED ORAL DAILY
Qty: 90 TABLET | Refills: 1 | Status: SHIPPED | OUTPATIENT
Start: 2019-02-05 | End: 2019-08-08 | Stop reason: SDUPTHER

## 2019-02-05 RX ORDER — CARVEDILOL 3.12 MG/1
TABLET ORAL
COMMUNITY
Start: 2018-12-02 | End: 2019-02-05 | Stop reason: SDUPTHER

## 2019-02-05 RX ORDER — AMLODIPINE BESYLATE 10 MG/1
10 TABLET ORAL DAILY
Qty: 90 TABLET | Refills: 1 | Status: SHIPPED | OUTPATIENT
Start: 2019-02-05 | End: 2019-08-20 | Stop reason: SDUPTHER

## 2019-02-05 RX ORDER — CARVEDILOL 3.12 MG/1
3.12 TABLET ORAL 2 TIMES DAILY
Qty: 60 TABLET | Refills: 1 | Status: SHIPPED | OUTPATIENT
Start: 2019-02-05 | End: 2019-02-12 | Stop reason: SDUPTHER

## 2019-02-05 RX ORDER — ROSUVASTATIN CALCIUM 20 MG/1
20 TABLET, COATED ORAL DAILY
Qty: 90 TABLET | Refills: 1 | Status: SHIPPED | OUTPATIENT
Start: 2019-02-05 | End: 2019-07-24 | Stop reason: SDUPTHER

## 2019-02-05 NOTE — TELEPHONE ENCOUNTER
----- Message from Giselle Khan sent at 2/5/2019 12:39 PM CST -----  Contact: self/[phone]  Patient called to speak with July about a medication refill she wants sent to [facility].  She just needs a small supply to take before she takes her blood test.    Please call and advise if this can be done.    carvedilol (COREG) 3.125 MG tablet

## 2019-02-05 NOTE — PROGRESS NOTES
Subjective:       Patient ID: Trudy Horvath is a 78 y.o. female.    Chief Complaint: Follow-up (4 mos); Hypertension; and Hyperlipidemia    Hypertension   This is a chronic problem. The current episode started more than 1 year ago. The problem is unchanged. Pertinent negatives include no blurred vision, chest pain, headaches, orthopnea, palpitations, peripheral edema, PND or shortness of breath. Past treatments include calcium channel blockers, beta blockers, angiotensin blockers and diuretics. There is no history of chronic renal disease.   Hyperlipidemia   This is a chronic problem. The current episode started more than 1 year ago. The problem is uncontrolled. Recent lipid tests were reviewed and are high. She has no history of chronic renal disease, diabetes, hypothyroidism, liver disease, obesity or nephrotic syndrome. Pertinent negatives include no chest pain or shortness of breath.   Pt is certain that she is taking all of her medications. She has not brought her medications to the visit.  2D Echo done 6/18/18 showed LVH and diastolic dysfunction.  Results for orders placed or performed in visit on 01/30/19   Lipid panel   Result Value Ref Range    Cholesterol 421 (H) 120 - 199 mg/dL    Triglycerides 211 (H) 30 - 150 mg/dL    HDL 62 40 - 75 mg/dL    LDL Cholesterol 316.8 (H) 63.0 - 159.0 mg/dL    HDL/Chol Ratio 14.7 (L) 20.0 - 50.0 %    Total Cholesterol/HDL Ratio 6.8 (H) 2.0 - 5.0    Non-HDL Cholesterol 359 mg/dL   Comprehensive metabolic panel   Result Value Ref Range    Sodium 140 136 - 145 mmol/L    Potassium 3.2 (L) 3.5 - 5.1 mmol/L    Chloride 100 95 - 110 mmol/L    CO2 31 (H) 23 - 29 mmol/L    Glucose 136 (H) 70 - 110 mg/dL    BUN, Bld 22 8 - 23 mg/dL    Creatinine 1.0 0.5 - 1.4 mg/dL    Calcium 11.0 (H) 8.7 - 10.5 mg/dL    Total Protein 7.5 6.0 - 8.4 g/dL    Albumin 3.6 3.5 - 5.2 g/dL    Total Bilirubin 0.5 0.1 - 1.0 mg/dL    Alkaline Phosphatase 41 (L) 55 - 135 U/L    AST 22 10 - 40 U/L    ALT 18 10 - 44  U/L    Anion Gap 9 8 - 16 mmol/L    eGFR if African American >60.0 >60 mL/min/1.73 m^2    eGFR if non  53.3 (A) >60 mL/min/1.73 m^2     Review of Systems   Constitutional: Negative for chills and fever.   Eyes: Negative for blurred vision and visual disturbance.   Respiratory: Negative for shortness of breath.    Cardiovascular: Negative for chest pain, palpitations, orthopnea, leg swelling and PND.   Gastrointestinal: Positive for constipation. Negative for abdominal pain, anal bleeding, blood in stool, diarrhea, nausea and vomiting.   Genitourinary: Negative for dysuria and hematuria.   Neurological: Negative for headaches.       Objective:      Physical Exam   Constitutional: She appears well-developed and well-nourished. No distress.   HENT:   Head: Normocephalic and atraumatic.   Neck: Normal range of motion. Neck supple. No JVD present. No thyromegaly present.   Cardiovascular: Normal rate, regular rhythm and intact distal pulses. Exam reveals no gallop and no friction rub.   Murmur heard.   Systolic murmur is present with a grade of 3/6.  Pulmonary/Chest: Effort normal and breath sounds normal. She has no wheezes. She has no rales.   Lymphadenopathy:     She has no cervical adenopathy.   Skin: She is not diaphoretic.   Vitals reviewed.      Assessment:         See plan  Plan:       Trudy was seen today for follow-up, hypertension and hyperlipidemia.    Diagnoses and all orders for this visit:    Essential hypertension, benign: Stable  -     irbesartan-hydrochlorothiazide (AVALIDE) 300-12.5 mg per tablet; Take 1 tablet by mouth once daily.  -     carvedilol (COREG) 3.125 MG tablet; Take 1 tablet (3.125 mg total) by mouth 2 (two) times daily.  -     amLODIPine (NORVASC) 10 MG tablet; Take 1 tablet (10 mg total) by mouth once daily.    Pure hypercholesterolemia: Uncontrolled  -     Comprehensive metabolic panel; Future  -     Lipid panel; Future  -     rosuvastatin (CRESTOR) 20 MG tablet; Take 1  tablet (20 mg total) by mouth once daily.    Hypertensive left ventricular hypertrophy, without heart failure: Stable    Body mass index (BMI) less than or equal to 19 in adult: Stable    Osteoporosis, post-menopausal: Stable    Hypokalemia: Uncontrolled  -     potassium chloride SA (KLOR-CON M20) 20 MEQ tablet; Take 1 tablet (20 mEq total) by mouth once daily.    Hypercalcemia  -     PTH, intact; Future  -     Calcium, ionized; Future    F/U in 2 weeks with all medications.

## 2019-02-06 ENCOUNTER — TELEPHONE (OUTPATIENT)
Dept: FAMILY MEDICINE | Facility: CLINIC | Age: 79
End: 2019-02-06

## 2019-02-06 NOTE — TELEPHONE ENCOUNTER
Patient change=d her lab and follow up appointment with Dr Guzman since she will start taking her blood pressure medication Irbesartan 300-12.5 mg correctly.  Patient was advised to write BP from today and to bring with ALL her medication until the date of appointment   Patient verbalized understanding.

## 2019-02-06 NOTE — TELEPHONE ENCOUNTER
----- Message from Nereida Jeniffer sent at 2/6/2019  8:40 AM CST -----  Contact: self, [phone] (M)  Patient requests to speak with you, states she made mistake taking her medications.  Please advise.

## 2019-02-12 ENCOUNTER — TELEPHONE (OUTPATIENT)
Dept: FAMILY MEDICINE | Facility: CLINIC | Age: 79
End: 2019-02-12

## 2019-02-12 DIAGNOSIS — I10 ESSENTIAL HYPERTENSION, BENIGN: ICD-10-CM

## 2019-02-12 RX ORDER — CARVEDILOL 3.12 MG/1
3.12 TABLET ORAL 2 TIMES DAILY
Qty: 180 TABLET | Refills: 1 | Status: SHIPPED | OUTPATIENT
Start: 2019-02-12 | End: 2019-08-20 | Stop reason: SDUPTHER

## 2019-02-12 NOTE — TELEPHONE ENCOUNTER
----- Message from Samantha Workman sent at 2/12/2019 10:09 AM CST -----  Contact: self / [phone]  Patient is requesting a call back regarding, her medications she has questions. Please advise

## 2019-02-19 ENCOUNTER — LAB VISIT (OUTPATIENT)
Dept: LAB | Facility: HOSPITAL | Age: 79
End: 2019-02-19
Attending: FAMILY MEDICINE
Payer: MEDICARE

## 2019-02-19 DIAGNOSIS — E83.52 HYPERCALCEMIA: ICD-10-CM

## 2019-02-19 DIAGNOSIS — E78.00 PURE HYPERCHOLESTEROLEMIA: ICD-10-CM

## 2019-02-19 LAB
ALBUMIN SERPL BCP-MCNC: 3.6 G/DL
ALP SERPL-CCNC: 45 U/L
ALT SERPL W/O P-5'-P-CCNC: 19 U/L
ANION GAP SERPL CALC-SCNC: 9 MMOL/L
AST SERPL-CCNC: 21 U/L
BILIRUB SERPL-MCNC: 0.4 MG/DL
BUN SERPL-MCNC: 17 MG/DL
CA-I BLDV-SCNC: 1.3 MMOL/L
CALCIUM SERPL-MCNC: 10.2 MG/DL
CHLORIDE SERPL-SCNC: 102 MMOL/L
CHOLEST SERPL-MCNC: 283 MG/DL
CHOLEST/HDLC SERPL: 4.4 {RATIO}
CO2 SERPL-SCNC: 30 MMOL/L
CREAT SERPL-MCNC: 0.8 MG/DL
EST. GFR  (AFRICAN AMERICAN): >60 ML/MIN/1.73 M^2
EST. GFR  (NON AFRICAN AMERICAN): >60 ML/MIN/1.73 M^2
GLUCOSE SERPL-MCNC: 111 MG/DL
HDLC SERPL-MCNC: 64 MG/DL
HDLC SERPL: 22.6 %
LDLC SERPL CALC-MCNC: 192 MG/DL
NONHDLC SERPL-MCNC: 219 MG/DL
POTASSIUM SERPL-SCNC: 3.7 MMOL/L
PROT SERPL-MCNC: 7.1 G/DL
PTH-INTACT SERPL-MCNC: 26 PG/ML
SODIUM SERPL-SCNC: 141 MMOL/L
TRIGL SERPL-MCNC: 135 MG/DL

## 2019-02-19 PROCEDURE — 83970 ASSAY OF PARATHORMONE: CPT

## 2019-02-19 PROCEDURE — 36415 COLL VENOUS BLD VENIPUNCTURE: CPT | Mod: PO

## 2019-02-19 PROCEDURE — 82330 ASSAY OF CALCIUM: CPT

## 2019-02-19 PROCEDURE — 80061 LIPID PANEL: CPT

## 2019-02-19 PROCEDURE — 80053 COMPREHEN METABOLIC PANEL: CPT

## 2019-02-26 ENCOUNTER — OFFICE VISIT (OUTPATIENT)
Dept: FAMILY MEDICINE | Facility: CLINIC | Age: 79
End: 2019-02-26
Payer: MEDICARE

## 2019-02-26 VITALS
WEIGHT: 103.38 LBS | SYSTOLIC BLOOD PRESSURE: 138 MMHG | TEMPERATURE: 98 F | HEIGHT: 61 IN | BODY MASS INDEX: 19.52 KG/M2 | HEART RATE: 89 BPM | DIASTOLIC BLOOD PRESSURE: 70 MMHG | OXYGEN SATURATION: 98 %

## 2019-02-26 DIAGNOSIS — E87.6 HYPOKALEMIA: ICD-10-CM

## 2019-02-26 DIAGNOSIS — I10 ESSENTIAL HYPERTENSION, BENIGN: Primary | ICD-10-CM

## 2019-02-26 DIAGNOSIS — R73.01 ABNORMAL FASTING GLUCOSE: ICD-10-CM

## 2019-02-26 DIAGNOSIS — E78.00 PURE HYPERCHOLESTEROLEMIA: ICD-10-CM

## 2019-02-26 PROCEDURE — 99999 PR PBB SHADOW E&M-EST. PATIENT-LVL III: CPT | Mod: PBBFAC,,, | Performed by: FAMILY MEDICINE

## 2019-02-26 PROCEDURE — 99999 PR PBB SHADOW E&M-EST. PATIENT-LVL III: ICD-10-PCS | Mod: PBBFAC,,, | Performed by: FAMILY MEDICINE

## 2019-02-26 PROCEDURE — 99214 PR OFFICE/OUTPT VISIT, EST, LEVL IV, 30-39 MIN: ICD-10-PCS | Mod: S$PBB,,, | Performed by: FAMILY MEDICINE

## 2019-02-26 PROCEDURE — 99213 OFFICE O/P EST LOW 20 MIN: CPT | Mod: PBBFAC,PO | Performed by: FAMILY MEDICINE

## 2019-02-26 PROCEDURE — 99214 OFFICE O/P EST MOD 30 MIN: CPT | Mod: S$PBB,,, | Performed by: FAMILY MEDICINE

## 2019-02-26 RX ORDER — ACETAMINOPHEN 500 MG
1 TABLET ORAL DAILY
COMMUNITY

## 2019-02-26 NOTE — PROGRESS NOTES
Subjective:       Patient ID: Trudy Horvath is a 78 y.o. female.    Chief Complaint: Follow-up (HTN-med review)    HPI   79 yo female with HTN, hyperlipidemia, LVH, macular degeneration, osteoporosis, hypokalemia and hypercalcemia presents for f/u of her chronic conditions and review of all medications. Pt states that she is compliant with all medications. Denies chest pain, dyspnea or pedal edema.  Results for orders placed or performed in visit on 02/19/19   Comprehensive metabolic panel   Result Value Ref Range    Sodium 141 136 - 145 mmol/L    Potassium 3.7 3.5 - 5.1 mmol/L    Chloride 102 95 - 110 mmol/L    CO2 30 (H) 23 - 29 mmol/L    Glucose 111 (H) 70 - 110 mg/dL    BUN, Bld 17 8 - 23 mg/dL    Creatinine 0.8 0.5 - 1.4 mg/dL    Calcium 10.2 8.7 - 10.5 mg/dL    Total Protein 7.1 6.0 - 8.4 g/dL    Albumin 3.6 3.5 - 5.2 g/dL    Total Bilirubin 0.4 0.1 - 1.0 mg/dL    Alkaline Phosphatase 45 (L) 55 - 135 U/L    AST 21 10 - 40 U/L    ALT 19 10 - 44 U/L    Anion Gap 9 8 - 16 mmol/L    eGFR if African American >60.0 >60 mL/min/1.73 m^2    eGFR if non African American >60.0 >60 mL/min/1.73 m^2   Lipid panel   Result Value Ref Range    Cholesterol 283 (H) 120 - 199 mg/dL    Triglycerides 135 30 - 150 mg/dL    HDL 64 40 - 75 mg/dL    LDL Cholesterol 192.0 (H) 63.0 - 159.0 mg/dL    HDL/Chol Ratio 22.6 20.0 - 50.0 %    Total Cholesterol/HDL Ratio 4.4 2.0 - 5.0    Non-HDL Cholesterol 219 mg/dL   PTH, intact   Result Value Ref Range    PTH, Intact 26.0 9.0 - 77.0 pg/mL   Calcium, ionized   Result Value Ref Range    Calcium, Ion 1.30 1.06 - 1.42 mmol/L     Review of Systems   Constitutional: Negative for chills and fever.   Respiratory: Negative for shortness of breath.    Cardiovascular: Negative for chest pain, palpitations and leg swelling.   Gastrointestinal: Positive for constipation. Negative for abdominal pain, anal bleeding, blood in stool and diarrhea.   Genitourinary: Negative for dysuria and hematuria.        Objective:      Physical Exam   Constitutional: She appears well-developed and well-nourished. No distress.   HENT:   Head: Normocephalic and atraumatic.   Neck: Normal range of motion. Neck supple. No JVD present. No thyromegaly present.   Cardiovascular: Normal rate, regular rhythm and normal heart sounds.   Pulmonary/Chest: Effort normal and breath sounds normal. She has no wheezes. She has no rales.   Abdominal: Soft. Bowel sounds are normal. She exhibits no mass. There is no tenderness.   Lymphadenopathy:     She has no cervical adenopathy.   Skin: Skin is warm and dry. She is not diaphoretic.   Vitals reviewed.      Assessment:       See plan  Plan:       Trudy was seen today for follow-up.    Diagnoses and all orders for this visit:    Essential hypertension, benign: Stable    Pure hypercholesterolemia: Stable  -     Lipid panel; Future  -     Comprehensive metabolic panel; Future    Hypokalemia: Stable    Abnormal fasting glucose  Pt advised to decrease intake of white bread, white rice, corn, potatoes, pasta and sugar.  -     Hemoglobin A1c; Future    F/U in 6 weeks. Pt advised to bring all medications to each visit.

## 2019-02-28 ENCOUNTER — EXTERNAL CHRONIC CARE MANAGEMENT (OUTPATIENT)
Dept: PRIMARY CARE CLINIC | Facility: CLINIC | Age: 79
End: 2019-02-28
Payer: MEDICARE

## 2019-02-28 PROCEDURE — 99490 PR CHRONIC CARE MGMT, 1ST 20 MIN: ICD-10-PCS | Mod: S$PBB,,, | Performed by: FAMILY MEDICINE

## 2019-02-28 PROCEDURE — 99490 CHRNC CARE MGMT STAFF 1ST 20: CPT | Mod: S$PBB,,, | Performed by: FAMILY MEDICINE

## 2019-02-28 PROCEDURE — 99490 CHRNC CARE MGMT STAFF 1ST 20: CPT | Mod: PBBFAC,PO | Performed by: FAMILY MEDICINE

## 2019-03-02 DIAGNOSIS — I10 ESSENTIAL HYPERTENSION, BENIGN: ICD-10-CM

## 2019-03-02 DIAGNOSIS — I11.9 HYPERTENSIVE LEFT VENTRICULAR HYPERTROPHY, WITHOUT HEART FAILURE: ICD-10-CM

## 2019-03-02 RX ORDER — CARVEDILOL 3.12 MG/1
TABLET ORAL
Qty: 180 TABLET | Refills: 1 | Status: SHIPPED | OUTPATIENT
Start: 2019-03-02 | End: 2019-04-16 | Stop reason: SDUPTHER

## 2019-03-31 ENCOUNTER — EXTERNAL CHRONIC CARE MANAGEMENT (OUTPATIENT)
Dept: PRIMARY CARE CLINIC | Facility: CLINIC | Age: 79
End: 2019-03-31
Payer: MEDICARE

## 2019-03-31 PROCEDURE — 99490 CHRNC CARE MGMT STAFF 1ST 20: CPT | Mod: S$PBB,,, | Performed by: FAMILY MEDICINE

## 2019-03-31 PROCEDURE — 99490 CHRNC CARE MGMT STAFF 1ST 20: CPT | Mod: PBBFAC,PO | Performed by: FAMILY MEDICINE

## 2019-03-31 PROCEDURE — 99490 PR CHRONIC CARE MGMT, 1ST 20 MIN: ICD-10-PCS | Mod: S$PBB,,, | Performed by: FAMILY MEDICINE

## 2019-04-02 ENCOUNTER — LAB VISIT (OUTPATIENT)
Dept: LAB | Facility: HOSPITAL | Age: 79
End: 2019-04-02
Attending: FAMILY MEDICINE
Payer: MEDICARE

## 2019-04-02 DIAGNOSIS — R73.01 ABNORMAL FASTING GLUCOSE: ICD-10-CM

## 2019-04-02 DIAGNOSIS — E78.00 PURE HYPERCHOLESTEROLEMIA: ICD-10-CM

## 2019-04-02 LAB
ALBUMIN SERPL BCP-MCNC: 3.9 G/DL (ref 3.5–5.2)
ALP SERPL-CCNC: 52 U/L (ref 55–135)
ALT SERPL W/O P-5'-P-CCNC: 12 U/L (ref 10–44)
ANION GAP SERPL CALC-SCNC: 8 MMOL/L (ref 8–16)
AST SERPL-CCNC: 17 U/L (ref 10–40)
BILIRUB SERPL-MCNC: 0.4 MG/DL (ref 0.1–1)
BUN SERPL-MCNC: 24 MG/DL (ref 8–23)
CALCIUM SERPL-MCNC: 10.6 MG/DL (ref 8.7–10.5)
CHLORIDE SERPL-SCNC: 102 MMOL/L (ref 95–110)
CHOLEST SERPL-MCNC: 205 MG/DL (ref 120–199)
CHOLEST/HDLC SERPL: 3.1 {RATIO} (ref 2–5)
CO2 SERPL-SCNC: 29 MMOL/L (ref 23–29)
CREAT SERPL-MCNC: 1 MG/DL (ref 0.5–1.4)
EST. GFR  (AFRICAN AMERICAN): >60 ML/MIN/1.73 M^2
EST. GFR  (NON AFRICAN AMERICAN): 53.3 ML/MIN/1.73 M^2
ESTIMATED AVG GLUCOSE: 108 MG/DL (ref 68–131)
GLUCOSE SERPL-MCNC: 107 MG/DL (ref 70–110)
HBA1C MFR BLD HPLC: 5.4 % (ref 4–5.6)
HDLC SERPL-MCNC: 66 MG/DL (ref 40–75)
HDLC SERPL: 32.2 % (ref 20–50)
LDLC SERPL CALC-MCNC: 118.8 MG/DL (ref 63–159)
NONHDLC SERPL-MCNC: 139 MG/DL
POTASSIUM SERPL-SCNC: 4 MMOL/L (ref 3.5–5.1)
PROT SERPL-MCNC: 7.6 G/DL (ref 6–8.4)
SODIUM SERPL-SCNC: 139 MMOL/L (ref 136–145)
TRIGL SERPL-MCNC: 101 MG/DL (ref 30–150)

## 2019-04-02 PROCEDURE — 83036 HEMOGLOBIN GLYCOSYLATED A1C: CPT

## 2019-04-02 PROCEDURE — 80061 LIPID PANEL: CPT

## 2019-04-02 PROCEDURE — 80053 COMPREHEN METABOLIC PANEL: CPT

## 2019-04-02 PROCEDURE — 36415 COLL VENOUS BLD VENIPUNCTURE: CPT | Mod: PO

## 2019-04-11 ENCOUNTER — PES CALL (OUTPATIENT)
Dept: ADMINISTRATIVE | Facility: CLINIC | Age: 79
End: 2019-04-11

## 2019-04-16 ENCOUNTER — OFFICE VISIT (OUTPATIENT)
Dept: FAMILY MEDICINE | Facility: CLINIC | Age: 79
End: 2019-04-16
Payer: MEDICARE

## 2019-04-16 VITALS
OXYGEN SATURATION: 97 % | WEIGHT: 99 LBS | BODY MASS INDEX: 18.69 KG/M2 | TEMPERATURE: 98 F | DIASTOLIC BLOOD PRESSURE: 80 MMHG | SYSTOLIC BLOOD PRESSURE: 132 MMHG | HEIGHT: 61 IN | HEART RATE: 100 BPM

## 2019-04-16 DIAGNOSIS — I10 ESSENTIAL HYPERTENSION, BENIGN: Primary | ICD-10-CM

## 2019-04-16 DIAGNOSIS — I11.9 HYPERTENSIVE LEFT VENTRICULAR HYPERTROPHY, WITHOUT HEART FAILURE: ICD-10-CM

## 2019-04-16 DIAGNOSIS — R19.7 DIARRHEA, UNSPECIFIED TYPE: ICD-10-CM

## 2019-04-16 DIAGNOSIS — R30.0 DYSURIA: ICD-10-CM

## 2019-04-16 DIAGNOSIS — E78.00 PURE HYPERCHOLESTEROLEMIA: ICD-10-CM

## 2019-04-16 LAB
BILIRUB UR QL STRIP: NEGATIVE
CLARITY UR: ABNORMAL
COLOR UR: YELLOW
GLUCOSE UR QL STRIP: NEGATIVE
HGB UR QL STRIP: ABNORMAL
KETONES UR QL STRIP: NEGATIVE
LEUKOCYTE ESTERASE UR QL STRIP: NEGATIVE
MICROSCOPIC COMMENT: NORMAL
NITRITE UR QL STRIP: NEGATIVE
PH UR STRIP: 5 [PH] (ref 5–8)
PROT UR QL STRIP: NEGATIVE
RBC #/AREA URNS AUTO: 1 /HPF (ref 0–4)
SP GR UR STRIP: 1.01 (ref 1–1.03)
SQUAMOUS #/AREA URNS AUTO: 1 /HPF
URN SPEC COLLECT METH UR: ABNORMAL
UROBILINOGEN UR STRIP-ACNC: NEGATIVE EU/DL
WBC #/AREA URNS AUTO: 1 /HPF (ref 0–5)

## 2019-04-16 PROCEDURE — 99999 PR PBB SHADOW E&M-EST. PATIENT-LVL III: CPT | Mod: PBBFAC,,, | Performed by: FAMILY MEDICINE

## 2019-04-16 PROCEDURE — 99214 PR OFFICE/OUTPT VISIT, EST, LEVL IV, 30-39 MIN: ICD-10-PCS | Mod: S$PBB,,, | Performed by: FAMILY MEDICINE

## 2019-04-16 PROCEDURE — 81000 URINALYSIS NONAUTO W/SCOPE: CPT | Mod: PO

## 2019-04-16 PROCEDURE — 99214 OFFICE O/P EST MOD 30 MIN: CPT | Mod: S$PBB,,, | Performed by: FAMILY MEDICINE

## 2019-04-16 PROCEDURE — 99999 PR PBB SHADOW E&M-EST. PATIENT-LVL III: ICD-10-PCS | Mod: PBBFAC,,, | Performed by: FAMILY MEDICINE

## 2019-04-16 PROCEDURE — 99213 OFFICE O/P EST LOW 20 MIN: CPT | Mod: PBBFAC,PO | Performed by: FAMILY MEDICINE

## 2019-04-16 NOTE — PROGRESS NOTES
Subjective:       Patient ID: Trudy Horvath is a 78 y.o. female.    Chief Complaint: Diarrhea (x 1 week); Dysuria; Hypertension; and Hyperlipidemia    HPI   77 yo female with HTN, LVH and hyperlipidemia presents for f/u of hyperlipidemia. Pt notes intermittent diarrhea for the past 7 days. Pt notes family members with similar symptoms. No fever or chills. No blood in stool. No nausea or vomiting.  Pt also notes intermittent dysuria. No hematuria.  Results for orders placed or performed in visit on 04/02/19   Lipid panel   Result Value Ref Range    Cholesterol 205 (H) 120 - 199 mg/dL    Triglycerides 101 30 - 150 mg/dL    HDL 66 40 - 75 mg/dL    LDL Cholesterol 118.8 63.0 - 159.0 mg/dL    HDL/Chol Ratio 32.2 20.0 - 50.0 %    Total Cholesterol/HDL Ratio 3.1 2.0 - 5.0    Non-HDL Cholesterol 139 mg/dL   Comprehensive metabolic panel   Result Value Ref Range    Sodium 139 136 - 145 mmol/L    Potassium 4.0 3.5 - 5.1 mmol/L    Chloride 102 95 - 110 mmol/L    CO2 29 23 - 29 mmol/L    Glucose 107 70 - 110 mg/dL    BUN, Bld 24 (H) 8 - 23 mg/dL    Creatinine 1.0 0.5 - 1.4 mg/dL    Calcium 10.6 (H) 8.7 - 10.5 mg/dL    Total Protein 7.6 6.0 - 8.4 g/dL    Albumin 3.9 3.5 - 5.2 g/dL    Total Bilirubin 0.4 0.1 - 1.0 mg/dL    Alkaline Phosphatase 52 (L) 55 - 135 U/L    AST 17 10 - 40 U/L    ALT 12 10 - 44 U/L    Anion Gap 8 8 - 16 mmol/L    eGFR if African American >60.0 >60 mL/min/1.73 m^2    eGFR if non  53.3 (A) >60 mL/min/1.73 m^2   Hemoglobin A1c   Result Value Ref Range    Hemoglobin A1C 5.4 4.0 - 5.6 %    Estimated Avg Glucose 108 68 - 131 mg/dL     Review of Systems   Constitutional: Negative for chills and fever.   Respiratory: Negative for shortness of breath.    Cardiovascular: Negative for chest pain, palpitations and leg swelling.   Gastrointestinal:        See HPI       Objective:      Physical Exam   Constitutional: She appears well-developed and well-nourished. No distress.   HENT:   Head: Normocephalic  and atraumatic.   Neck: Normal range of motion. Neck supple. No JVD present. No thyromegaly present.   Cardiovascular: Normal rate and regular rhythm.   Murmur heard.   Systolic murmur is present with a grade of 4/6.  Pulmonary/Chest: Effort normal and breath sounds normal. She has no wheezes. She has no rales.   Abdominal: Soft. Bowel sounds are normal. She exhibits no mass. There is no tenderness. There is no rebound.   Lymphadenopathy:     She has no cervical adenopathy.   Skin: Skin is warm and dry. She is not diaphoretic.   Vitals reviewed.      Assessment:         See plan  Plan:       Trudy was seen today for diarrhea, dysuria, hypertension and hyperlipidemia.    Diagnoses and all orders for this visit:    Essential hypertension, benign: Stable    Hypertensive left ventricular hypertrophy, without heart failure: Stable    Pure hypercholesterolemia: Stable  -     Lipid panel; Future  -     Comprehensive metabolic panel; Future    Dysuria  -     Urinalysis    Diarrhea, unspecified type  - High fiber diet advised. Pt advised to avoid dairy products.    F/U in 4 months.

## 2019-04-30 ENCOUNTER — EXTERNAL CHRONIC CARE MANAGEMENT (OUTPATIENT)
Dept: PRIMARY CARE CLINIC | Facility: CLINIC | Age: 79
End: 2019-04-30
Payer: MEDICARE

## 2019-04-30 PROCEDURE — 99490 PR CHRONIC CARE MGMT, 1ST 20 MIN: ICD-10-PCS | Mod: S$PBB,,, | Performed by: FAMILY MEDICINE

## 2019-04-30 PROCEDURE — 99490 CHRNC CARE MGMT STAFF 1ST 20: CPT | Mod: PBBFAC,PO | Performed by: FAMILY MEDICINE

## 2019-04-30 PROCEDURE — 99490 CHRNC CARE MGMT STAFF 1ST 20: CPT | Mod: S$PBB,,, | Performed by: FAMILY MEDICINE

## 2019-05-31 ENCOUNTER — EXTERNAL CHRONIC CARE MANAGEMENT (OUTPATIENT)
Dept: PRIMARY CARE CLINIC | Facility: CLINIC | Age: 79
End: 2019-05-31
Payer: MEDICARE

## 2019-05-31 PROCEDURE — 99490 CHRNC CARE MGMT STAFF 1ST 20: CPT | Mod: PBBFAC,PO | Performed by: FAMILY MEDICINE

## 2019-05-31 PROCEDURE — 99490 CHRNC CARE MGMT STAFF 1ST 20: CPT | Mod: S$PBB,,, | Performed by: FAMILY MEDICINE

## 2019-05-31 PROCEDURE — 99490 PR CHRONIC CARE MGMT, 1ST 20 MIN: ICD-10-PCS | Mod: S$PBB,,, | Performed by: FAMILY MEDICINE

## 2019-06-30 ENCOUNTER — EXTERNAL CHRONIC CARE MANAGEMENT (OUTPATIENT)
Dept: PRIMARY CARE CLINIC | Facility: CLINIC | Age: 79
End: 2019-06-30
Payer: MEDICARE

## 2019-06-30 PROCEDURE — 99490 PR CHRONIC CARE MGMT, 1ST 20 MIN: ICD-10-PCS | Mod: S$PBB,,, | Performed by: FAMILY MEDICINE

## 2019-06-30 PROCEDURE — 99490 CHRNC CARE MGMT STAFF 1ST 20: CPT | Mod: PBBFAC,PO | Performed by: FAMILY MEDICINE

## 2019-06-30 PROCEDURE — 99490 CHRNC CARE MGMT STAFF 1ST 20: CPT | Mod: S$PBB,,, | Performed by: FAMILY MEDICINE

## 2019-07-24 DIAGNOSIS — E78.00 PURE HYPERCHOLESTEROLEMIA: ICD-10-CM

## 2019-07-24 RX ORDER — ROSUVASTATIN CALCIUM 20 MG/1
20 TABLET, COATED ORAL DAILY
Qty: 90 TABLET | Refills: 1 | Status: SHIPPED | OUTPATIENT
Start: 2019-07-24 | End: 2019-07-24 | Stop reason: SDUPTHER

## 2019-07-24 RX ORDER — ROSUVASTATIN CALCIUM 20 MG/1
20 TABLET, COATED ORAL DAILY
Qty: 30 TABLET | Refills: 0 | OUTPATIENT
Start: 2019-07-24

## 2019-07-24 RX ORDER — ROSUVASTATIN CALCIUM 20 MG/1
20 TABLET, COATED ORAL DAILY
Qty: 90 TABLET | Refills: 1 | Status: SHIPPED | OUTPATIENT
Start: 2019-07-24 | End: 2019-08-20 | Stop reason: SDUPTHER

## 2019-07-24 NOTE — TELEPHONE ENCOUNTER
----- Message from Jacy Smith sent at 7/24/2019  7:44 AM CDT -----  Contact: [phone]/self  Patient requesting to speak with you regarding getting a refill on her rosuvastatin (CRESTOR) 20 MG tablet

## 2019-07-28 DIAGNOSIS — E87.6 HYPOKALEMIA: ICD-10-CM

## 2019-07-28 RX ORDER — POTASSIUM CHLORIDE 1500 MG/1
TABLET, EXTENDED RELEASE ORAL
Qty: 90 TABLET | Refills: 1 | Status: SHIPPED | OUTPATIENT
Start: 2019-07-28 | End: 2019-08-20 | Stop reason: SDUPTHER

## 2019-07-31 ENCOUNTER — EXTERNAL CHRONIC CARE MANAGEMENT (OUTPATIENT)
Dept: PRIMARY CARE CLINIC | Facility: CLINIC | Age: 79
End: 2019-07-31
Payer: MEDICARE

## 2019-07-31 PROCEDURE — 99490 CHRNC CARE MGMT STAFF 1ST 20: CPT | Mod: S$PBB,,, | Performed by: FAMILY MEDICINE

## 2019-07-31 PROCEDURE — 99490 PR CHRONIC CARE MGMT, 1ST 20 MIN: ICD-10-PCS | Mod: S$PBB,,, | Performed by: FAMILY MEDICINE

## 2019-07-31 PROCEDURE — 99490 CHRNC CARE MGMT STAFF 1ST 20: CPT | Mod: PBBFAC,PO | Performed by: FAMILY MEDICINE

## 2019-08-05 ENCOUNTER — LAB VISIT (OUTPATIENT)
Dept: LAB | Facility: HOSPITAL | Age: 79
End: 2019-08-05
Attending: FAMILY MEDICINE
Payer: MEDICARE

## 2019-08-05 DIAGNOSIS — E78.00 PURE HYPERCHOLESTEROLEMIA: ICD-10-CM

## 2019-08-05 LAB
ALBUMIN SERPL BCP-MCNC: 3.8 G/DL (ref 3.5–5.2)
ALP SERPL-CCNC: 67 U/L (ref 55–135)
ALT SERPL W/O P-5'-P-CCNC: 14 U/L (ref 10–44)
ANION GAP SERPL CALC-SCNC: 9 MMOL/L (ref 8–16)
AST SERPL-CCNC: 18 U/L (ref 10–40)
BILIRUB SERPL-MCNC: 0.4 MG/DL (ref 0.1–1)
BUN SERPL-MCNC: 21 MG/DL (ref 8–23)
CALCIUM SERPL-MCNC: 10.7 MG/DL (ref 8.7–10.5)
CHLORIDE SERPL-SCNC: 100 MMOL/L (ref 95–110)
CHOLEST SERPL-MCNC: 181 MG/DL (ref 120–199)
CHOLEST/HDLC SERPL: 2.9 {RATIO} (ref 2–5)
CO2 SERPL-SCNC: 27 MMOL/L (ref 23–29)
CREAT SERPL-MCNC: 0.9 MG/DL (ref 0.5–1.4)
EST. GFR  (AFRICAN AMERICAN): >60 ML/MIN/1.73 M^2
EST. GFR  (NON AFRICAN AMERICAN): >60 ML/MIN/1.73 M^2
GLUCOSE SERPL-MCNC: 98 MG/DL (ref 70–110)
HDLC SERPL-MCNC: 62 MG/DL (ref 40–75)
HDLC SERPL: 34.3 % (ref 20–50)
LDLC SERPL CALC-MCNC: 95.2 MG/DL (ref 63–159)
NONHDLC SERPL-MCNC: 119 MG/DL
POTASSIUM SERPL-SCNC: 4 MMOL/L (ref 3.5–5.1)
PROT SERPL-MCNC: 7.5 G/DL (ref 6–8.4)
SODIUM SERPL-SCNC: 136 MMOL/L (ref 136–145)
TRIGL SERPL-MCNC: 119 MG/DL (ref 30–150)

## 2019-08-05 PROCEDURE — 80053 COMPREHEN METABOLIC PANEL: CPT

## 2019-08-05 PROCEDURE — 80061 LIPID PANEL: CPT

## 2019-08-05 PROCEDURE — 36415 COLL VENOUS BLD VENIPUNCTURE: CPT | Mod: PO

## 2019-08-06 ENCOUNTER — TELEPHONE (OUTPATIENT)
Dept: FAMILY MEDICINE | Facility: CLINIC | Age: 79
End: 2019-08-06

## 2019-08-06 DIAGNOSIS — Z12.31 ENCOUNTER FOR SCREENING MAMMOGRAM FOR HIGH-RISK PATIENT: Primary | ICD-10-CM

## 2019-08-06 NOTE — TELEPHONE ENCOUNTER
----- Message from Hilda Joya MA sent at 8/5/2019  4:48 PM CDT -----  Contact: [phone] self      ----- Message -----  From: Piper Rosa  Sent: 8/5/2019   1:51 PM  To: Thomas DO Staff    Pt is requesting to a mammogram order to be sent to [facility]. Please

## 2019-08-08 DIAGNOSIS — I10 ESSENTIAL HYPERTENSION, BENIGN: ICD-10-CM

## 2019-08-08 RX ORDER — IRBESARTAN AND HYDROCHLOROTHIAZIDE 300; 12.5 MG/1; MG/1
TABLET, FILM COATED ORAL
Qty: 90 TABLET | Refills: 1 | Status: SHIPPED | OUTPATIENT
Start: 2019-08-08 | End: 2019-08-20 | Stop reason: SDUPTHER

## 2019-08-20 ENCOUNTER — OFFICE VISIT (OUTPATIENT)
Dept: FAMILY MEDICINE | Facility: CLINIC | Age: 79
End: 2019-08-20
Payer: MEDICARE

## 2019-08-20 VITALS
DIASTOLIC BLOOD PRESSURE: 60 MMHG | HEIGHT: 61 IN | OXYGEN SATURATION: 97 % | SYSTOLIC BLOOD PRESSURE: 138 MMHG | WEIGHT: 101.88 LBS | BODY MASS INDEX: 19.23 KG/M2 | HEART RATE: 85 BPM

## 2019-08-20 DIAGNOSIS — I11.9 HYPERTENSIVE LEFT VENTRICULAR HYPERTROPHY, WITHOUT HEART FAILURE: ICD-10-CM

## 2019-08-20 DIAGNOSIS — H35.3231 EXUDATIVE AGE-RELATED MACULAR DEGENERATION OF BOTH EYES WITH ACTIVE CHOROIDAL NEOVASCULARIZATION: ICD-10-CM

## 2019-08-20 DIAGNOSIS — E78.00 PURE HYPERCHOLESTEROLEMIA: ICD-10-CM

## 2019-08-20 DIAGNOSIS — E87.6 HYPOKALEMIA: ICD-10-CM

## 2019-08-20 DIAGNOSIS — I10 ESSENTIAL HYPERTENSION, BENIGN: Primary | ICD-10-CM

## 2019-08-20 DIAGNOSIS — I51.89 DIASTOLIC DYSFUNCTION: ICD-10-CM

## 2019-08-20 DIAGNOSIS — Z12.11 COLON CANCER SCREENING: ICD-10-CM

## 2019-08-20 DIAGNOSIS — M81.0 OSTEOPOROSIS, POST-MENOPAUSAL: ICD-10-CM

## 2019-08-20 PROCEDURE — 99214 OFFICE O/P EST MOD 30 MIN: CPT | Mod: S$PBB,,, | Performed by: FAMILY MEDICINE

## 2019-08-20 PROCEDURE — 99213 OFFICE O/P EST LOW 20 MIN: CPT | Mod: PBBFAC,PO | Performed by: FAMILY MEDICINE

## 2019-08-20 PROCEDURE — 99999 PR PBB SHADOW E&M-EST. PATIENT-LVL III: CPT | Mod: PBBFAC,,, | Performed by: FAMILY MEDICINE

## 2019-08-20 PROCEDURE — 99999 PR PBB SHADOW E&M-EST. PATIENT-LVL III: ICD-10-PCS | Mod: PBBFAC,,, | Performed by: FAMILY MEDICINE

## 2019-08-20 PROCEDURE — 99214 PR OFFICE/OUTPT VISIT, EST, LEVL IV, 30-39 MIN: ICD-10-PCS | Mod: S$PBB,,, | Performed by: FAMILY MEDICINE

## 2019-08-20 RX ORDER — TIMOLOL MALEATE 5 MG/ML
SOLUTION/ DROPS OPHTHALMIC
COMMUNITY
Start: 2019-06-11

## 2019-08-20 RX ORDER — ROSUVASTATIN CALCIUM 20 MG/1
20 TABLET, COATED ORAL DAILY
Qty: 90 TABLET | Refills: 1 | Status: SHIPPED | OUTPATIENT
Start: 2019-08-20 | End: 2020-01-10 | Stop reason: SDUPTHER

## 2019-08-20 RX ORDER — CARVEDILOL 3.12 MG/1
3.12 TABLET ORAL 2 TIMES DAILY
Qty: 180 TABLET | Refills: 1 | Status: SHIPPED | OUTPATIENT
Start: 2019-08-20 | End: 2020-01-10 | Stop reason: SDUPTHER

## 2019-08-20 RX ORDER — IRBESARTAN AND HYDROCHLOROTHIAZIDE 300; 12.5 MG/1; MG/1
1 TABLET, FILM COATED ORAL DAILY
Qty: 90 TABLET | Refills: 1 | Status: SHIPPED | OUTPATIENT
Start: 2019-08-20 | End: 2020-01-10 | Stop reason: SDUPTHER

## 2019-08-20 RX ORDER — AMLODIPINE BESYLATE 10 MG/1
10 TABLET ORAL DAILY
Qty: 90 TABLET | Refills: 1 | Status: SHIPPED | OUTPATIENT
Start: 2019-08-20 | End: 2020-01-10 | Stop reason: SDUPTHER

## 2019-08-20 RX ORDER — POTASSIUM CHLORIDE 20 MEQ/1
20 TABLET, EXTENDED RELEASE ORAL DAILY
Qty: 90 TABLET | Refills: 1 | Status: SHIPPED | OUTPATIENT
Start: 2019-08-20 | End: 2020-01-10 | Stop reason: SDUPTHER

## 2019-08-20 NOTE — PROGRESS NOTES
Subjective:       Patient ID: Trudy Horvath is a 78 y.o. female.    Chief Complaint: Follow-up (4 mos); Hypertension; Hemorrhoids; and Hyperlipidemia    Hypertension   This is a chronic problem. The current episode started more than 1 year ago. The problem is unchanged. The problem is controlled. Pertinent negatives include no blurred vision, chest pain, headaches, orthopnea, palpitations, peripheral edema, PND or shortness of breath. Past treatments include calcium channel blockers, beta blockers, angiotensin blockers and diuretics. The current treatment provides significant improvement. There are no compliance problems.  Hypertensive end-organ damage includes left ventricular hypertrophy. There is no history of chronic renal disease.   Hyperlipidemia   This is a chronic problem. The current episode started more than 1 year ago. The problem is controlled. Recent lipid tests were reviewed and are normal. She has no history of chronic renal disease, diabetes, hypothyroidism, liver disease, obesity or nephrotic syndrome. Pertinent negatives include no chest pain, focal sensory loss, focal weakness, leg pain, myalgias or shortness of breath. Current antihyperlipidemic treatment includes statins. The current treatment provides significant improvement of lipids. There are no compliance problems.    Pt is caring for her daughter who has a torn rotator cuff.  Pt notes flare of hemorrhoids 2 weeks ago. Had painless rectal bleeding 2 weeks ago after a bout of constipation.  Pt is followed by Ophthalmology for macular degeneration.  2D Echo done 6/18/18 show LVH and diastolic dysfunction.  Results for orders placed or performed in visit on 08/05/19   Lipid panel   Result Value Ref Range    Cholesterol 181 120 - 199 mg/dL    Triglycerides 119 30 - 150 mg/dL    HDL 62 40 - 75 mg/dL    LDL Cholesterol 95.2 63.0 - 159.0 mg/dL    Hdl/Cholesterol Ratio 34.3 20.0 - 50.0 %    Total Cholesterol/HDL Ratio 2.9 2.0 - 5.0    Non-HDL  Cholesterol 119 mg/dL   Comprehensive metabolic panel   Result Value Ref Range    Sodium 136 136 - 145 mmol/L    Potassium 4.0 3.5 - 5.1 mmol/L    Chloride 100 95 - 110 mmol/L    CO2 27 23 - 29 mmol/L    Glucose 98 70 - 110 mg/dL    BUN, Bld 21 8 - 23 mg/dL    Creatinine 0.9 0.5 - 1.4 mg/dL    Calcium 10.7 (H) 8.7 - 10.5 mg/dL    Total Protein 7.5 6.0 - 8.4 g/dL    Albumin 3.8 3.5 - 5.2 g/dL    Total Bilirubin 0.4 0.1 - 1.0 mg/dL    Alkaline Phosphatase 67 55 - 135 U/L    AST 18 10 - 40 U/L    ALT 14 10 - 44 U/L    Anion Gap 9 8 - 16 mmol/L    eGFR if African American >60.0 >60 mL/min/1.73 m^2    eGFR if non African American >60.0 >60 mL/min/1.73 m^2     Review of Systems   Eyes: Negative for blurred vision.   Respiratory: Negative for shortness of breath.    Cardiovascular: Negative for chest pain, palpitations, orthopnea, leg swelling and PND.   Gastrointestinal: Negative for abdominal pain.   Musculoskeletal: Negative for myalgias.   Neurological: Negative for focal weakness and headaches.       Objective:      Physical Exam   Constitutional: She appears well-developed and well-nourished. No distress.   HENT:   Head: Normocephalic and atraumatic.   Neck: Normal range of motion. Neck supple. No JVD present. No thyromegaly present.   Cardiovascular: Normal rate, regular rhythm, normal heart sounds and intact distal pulses. Exam reveals no gallop and no friction rub.   No murmur heard.  Pulmonary/Chest: Effort normal. She has no wheezes. She has no rales.   Abdominal: Soft. Bowel sounds are normal. She exhibits no mass. There is no tenderness.   Lymphadenopathy:     She has no cervical adenopathy.   Skin: Skin is warm and dry. She is not diaphoretic.   Vitals reviewed.      Assessment:         See plan  Plan:       Trudy was seen today for follow-up, hypertension, hemorrhoids and hyperlipidemia.    Diagnoses and all orders for this visit:    Essential hypertension, benign: Stable  -     CBC auto differential;  Future  -     amLODIPine (NORVASC) 10 MG tablet; Take 1 tablet (10 mg total) by mouth once daily.  -     carvedilol (COREG) 3.125 MG tablet; Take 1 tablet (3.125 mg total) by mouth 2 (two) times daily.  -     irbesartan-hydrochlorothiazide (AVALIDE) 300-12.5 mg per tablet; Take 1 tablet by mouth once daily.    Pure hypercholesterolemia: Stable  -     Lipid panel; Future  -     Comprehensive metabolic panel; Future  -     rosuvastatin (CRESTOR) 20 MG tablet; Take 1 tablet (20 mg total) by mouth once daily.    Hypertensive left ventricular hypertrophy, without heart failure: Stable    Diastolic dysfunction: Stable    Body mass index (BMI) less than or equal to 19 in adult    Osteoporosis, post-menopausal: Stable  - Will schedule Prolia injection    Exudative age-related macular degeneration of both eyes with active choroidal neovascularization  - Continue f/u with Ophthalmology    Colon cancer screening  -     Fecal Immunochemical Test (iFOBT); Future    Hypokalemia  -     potassium chloride SA (KLOR-CON M20) 20 MEQ tablet; Take 1 tablet (20 mEq total) by mouth once daily.    F/U in 4 months.

## 2019-08-31 ENCOUNTER — EXTERNAL CHRONIC CARE MANAGEMENT (OUTPATIENT)
Dept: PRIMARY CARE CLINIC | Facility: CLINIC | Age: 79
End: 2019-08-31
Payer: MEDICARE

## 2019-08-31 PROCEDURE — 99490 CHRNC CARE MGMT STAFF 1ST 20: CPT | Mod: PBBFAC,PO | Performed by: FAMILY MEDICINE

## 2019-08-31 PROCEDURE — 99490 PR CHRONIC CARE MGMT, 1ST 20 MIN: ICD-10-PCS | Mod: S$PBB,,, | Performed by: FAMILY MEDICINE

## 2019-08-31 PROCEDURE — 99490 CHRNC CARE MGMT STAFF 1ST 20: CPT | Mod: S$PBB,,, | Performed by: FAMILY MEDICINE

## 2019-09-05 ENCOUNTER — DOCUMENTATION ONLY (OUTPATIENT)
Dept: FAMILY MEDICINE | Facility: CLINIC | Age: 79
End: 2019-09-05

## 2019-09-05 NOTE — PROGRESS NOTES
Bilateral screening mammogram done at [facility] on 9/3/19. Pt found to have clustered amorphous calcifications in the left breast central to the nipple in the retroareolar region which are indeterminate. Magnification views are recommended.

## 2019-09-17 ENCOUNTER — TELEPHONE (OUTPATIENT)
Dept: FAMILY MEDICINE | Facility: CLINIC | Age: 79
End: 2019-09-17

## 2019-09-17 NOTE — TELEPHONE ENCOUNTER
Left a message to Mrs Fischer @ [facility]  the first order was already faxed since 09/06/19 and will fax again today.  If she have not received by tomorrow 09/18/19 to call office back.

## 2019-09-17 NOTE — TELEPHONE ENCOUNTER
----- Message from Guerda Arana sent at 9/17/2019 10:02 AM CDT -----  Aaliyah with [facility] called.   No. [phone]    Patient has an appointment on 9/18/19 for a biopsy.  Please fax signed orders.   Fax no. [phone]     Aaliyah has been faxing orders since 9/3/19.

## 2019-09-24 ENCOUNTER — TELEPHONE (OUTPATIENT)
Dept: FAMILY MEDICINE | Facility: CLINIC | Age: 79
End: 2019-09-24

## 2019-09-24 NOTE — TELEPHONE ENCOUNTER
----- Message from Hilda Joya MA sent at 9/24/2019  9:19 AM CDT -----  Contact: self / [phone]      ----- Message -----  From: Samantha Workman  Sent: 9/24/2019   8:47 AM CDT  To: Thomas DO Staff    Her her biopsy results. Please advise

## 2019-09-25 NOTE — TELEPHONE ENCOUNTER
Patient requested a result on her breast biopsy done at [facility] on 09/19/19.  As per Dr Parker results were normal.  Patient verbalized understanding.

## 2019-12-05 ENCOUNTER — PES CALL (OUTPATIENT)
Dept: ADMINISTRATIVE | Facility: CLINIC | Age: 79
End: 2019-12-05

## 2019-12-11 ENCOUNTER — PES CALL (OUTPATIENT)
Dept: ADMINISTRATIVE | Facility: CLINIC | Age: 79
End: 2019-12-11

## 2020-01-07 ENCOUNTER — LAB VISIT (OUTPATIENT)
Dept: LAB | Facility: HOSPITAL | Age: 80
End: 2020-01-07
Attending: FAMILY MEDICINE
Payer: MEDICARE

## 2020-01-07 DIAGNOSIS — I10 ESSENTIAL HYPERTENSION, BENIGN: ICD-10-CM

## 2020-01-07 DIAGNOSIS — E78.00 PURE HYPERCHOLESTEROLEMIA: ICD-10-CM

## 2020-01-07 LAB
ALBUMIN SERPL BCP-MCNC: 3.6 G/DL (ref 3.5–5.2)
ALP SERPL-CCNC: 74 U/L (ref 55–135)
ALT SERPL W/O P-5'-P-CCNC: 12 U/L (ref 10–44)
ANION GAP SERPL CALC-SCNC: 10 MMOL/L (ref 8–16)
AST SERPL-CCNC: 17 U/L (ref 10–40)
BASOPHILS # BLD AUTO: 0.05 K/UL (ref 0–0.2)
BASOPHILS NFR BLD: 0.5 % (ref 0–1.9)
BILIRUB SERPL-MCNC: 0.4 MG/DL (ref 0.1–1)
BUN SERPL-MCNC: 19 MG/DL (ref 8–23)
CALCIUM SERPL-MCNC: 10.1 MG/DL (ref 8.7–10.5)
CHLORIDE SERPL-SCNC: 105 MMOL/L (ref 95–110)
CHOLEST SERPL-MCNC: 183 MG/DL (ref 120–199)
CHOLEST/HDLC SERPL: 2.9 {RATIO} (ref 2–5)
CO2 SERPL-SCNC: 29 MMOL/L (ref 23–29)
CREAT SERPL-MCNC: 0.9 MG/DL (ref 0.5–1.4)
DIFFERENTIAL METHOD: ABNORMAL
EOSINOPHIL # BLD AUTO: 0.1 K/UL (ref 0–0.5)
EOSINOPHIL NFR BLD: 1 % (ref 0–8)
ERYTHROCYTE [DISTWIDTH] IN BLOOD BY AUTOMATED COUNT: 13.1 % (ref 11.5–14.5)
EST. GFR  (AFRICAN AMERICAN): >60 ML/MIN/1.73 M^2
EST. GFR  (NON AFRICAN AMERICAN): >60 ML/MIN/1.73 M^2
GLUCOSE SERPL-MCNC: 114 MG/DL (ref 70–110)
HCT VFR BLD AUTO: 39 % (ref 37–48.5)
HDLC SERPL-MCNC: 64 MG/DL (ref 40–75)
HDLC SERPL: 35 % (ref 20–50)
HGB BLD-MCNC: 11.9 G/DL (ref 12–16)
IMM GRANULOCYTES # BLD AUTO: 0.02 K/UL (ref 0–0.04)
IMM GRANULOCYTES NFR BLD AUTO: 0.2 % (ref 0–0.5)
LDLC SERPL CALC-MCNC: 99 MG/DL (ref 63–159)
LYMPHOCYTES # BLD AUTO: 1.3 K/UL (ref 1–4.8)
LYMPHOCYTES NFR BLD: 13.9 % (ref 18–48)
MCH RBC QN AUTO: 28.7 PG (ref 27–31)
MCHC RBC AUTO-ENTMCNC: 30.5 G/DL (ref 32–36)
MCV RBC AUTO: 94 FL (ref 82–98)
MONOCYTES # BLD AUTO: 0.7 K/UL (ref 0.3–1)
MONOCYTES NFR BLD: 7.7 % (ref 4–15)
NEUTROPHILS # BLD AUTO: 7.1 K/UL (ref 1.8–7.7)
NEUTROPHILS NFR BLD: 76.7 % (ref 38–73)
NONHDLC SERPL-MCNC: 119 MG/DL
NRBC BLD-RTO: 0 /100 WBC
PLATELET # BLD AUTO: 391 K/UL (ref 150–350)
PMV BLD AUTO: 10.9 FL (ref 9.2–12.9)
POTASSIUM SERPL-SCNC: 3.9 MMOL/L (ref 3.5–5.1)
PROT SERPL-MCNC: 7.2 G/DL (ref 6–8.4)
RBC # BLD AUTO: 4.15 M/UL (ref 4–5.4)
SODIUM SERPL-SCNC: 144 MMOL/L (ref 136–145)
TRIGL SERPL-MCNC: 100 MG/DL (ref 30–150)
WBC # BLD AUTO: 9.26 K/UL (ref 3.9–12.7)

## 2020-01-07 PROCEDURE — 36415 COLL VENOUS BLD VENIPUNCTURE: CPT | Mod: PO

## 2020-01-07 PROCEDURE — 80061 LIPID PANEL: CPT

## 2020-01-07 PROCEDURE — 85025 COMPLETE CBC W/AUTO DIFF WBC: CPT

## 2020-01-07 PROCEDURE — 80053 COMPREHEN METABOLIC PANEL: CPT

## 2020-01-10 ENCOUNTER — OFFICE VISIT (OUTPATIENT)
Dept: FAMILY MEDICINE | Facility: CLINIC | Age: 80
End: 2020-01-10
Payer: MEDICARE

## 2020-01-10 VITALS
OXYGEN SATURATION: 96 % | HEART RATE: 93 BPM | DIASTOLIC BLOOD PRESSURE: 70 MMHG | WEIGHT: 103.19 LBS | HEIGHT: 61 IN | BODY MASS INDEX: 19.48 KG/M2 | SYSTOLIC BLOOD PRESSURE: 130 MMHG

## 2020-01-10 DIAGNOSIS — I11.9 HYPERTENSIVE LEFT VENTRICULAR HYPERTROPHY, WITHOUT HEART FAILURE: ICD-10-CM

## 2020-01-10 DIAGNOSIS — M81.0 OSTEOPOROSIS, POST-MENOPAUSAL: ICD-10-CM

## 2020-01-10 DIAGNOSIS — E87.6 HYPOKALEMIA: ICD-10-CM

## 2020-01-10 DIAGNOSIS — I51.89 DIASTOLIC DYSFUNCTION: ICD-10-CM

## 2020-01-10 DIAGNOSIS — E78.00 PURE HYPERCHOLESTEROLEMIA: ICD-10-CM

## 2020-01-10 DIAGNOSIS — I10 ESSENTIAL HYPERTENSION, BENIGN: Primary | ICD-10-CM

## 2020-01-10 DIAGNOSIS — R73.01 ABNORMAL FASTING GLUCOSE: ICD-10-CM

## 2020-01-10 DIAGNOSIS — Z12.11 COLON CANCER SCREENING: ICD-10-CM

## 2020-01-10 DIAGNOSIS — H35.3231 EXUDATIVE AGE-RELATED MACULAR DEGENERATION OF BOTH EYES WITH ACTIVE CHOROIDAL NEOVASCULARIZATION: ICD-10-CM

## 2020-01-10 PROCEDURE — 99214 PR OFFICE/OUTPT VISIT, EST, LEVL IV, 30-39 MIN: ICD-10-PCS | Mod: S$PBB,,, | Performed by: FAMILY MEDICINE

## 2020-01-10 PROCEDURE — 1159F MED LIST DOCD IN RCRD: CPT | Mod: ,,, | Performed by: FAMILY MEDICINE

## 2020-01-10 PROCEDURE — 99213 OFFICE O/P EST LOW 20 MIN: CPT | Mod: PBBFAC,PO | Performed by: FAMILY MEDICINE

## 2020-01-10 PROCEDURE — 1126F AMNT PAIN NOTED NONE PRSNT: CPT | Mod: ,,, | Performed by: FAMILY MEDICINE

## 2020-01-10 PROCEDURE — 99999 PR PBB SHADOW E&M-EST. PATIENT-LVL III: CPT | Mod: PBBFAC,,, | Performed by: FAMILY MEDICINE

## 2020-01-10 PROCEDURE — 1126F PR PAIN SEVERITY QUANTIFIED, NO PAIN PRESENT: ICD-10-PCS | Mod: ,,, | Performed by: FAMILY MEDICINE

## 2020-01-10 PROCEDURE — 1159F PR MEDICATION LIST DOCUMENTED IN MEDICAL RECORD: ICD-10-PCS | Mod: ,,, | Performed by: FAMILY MEDICINE

## 2020-01-10 PROCEDURE — 99999 PR PBB SHADOW E&M-EST. PATIENT-LVL III: ICD-10-PCS | Mod: PBBFAC,,, | Performed by: FAMILY MEDICINE

## 2020-01-10 PROCEDURE — 99214 OFFICE O/P EST MOD 30 MIN: CPT | Mod: S$PBB,,, | Performed by: FAMILY MEDICINE

## 2020-01-10 RX ORDER — POTASSIUM CHLORIDE 20 MEQ/1
20 TABLET, EXTENDED RELEASE ORAL DAILY
Qty: 90 TABLET | Refills: 1 | Status: SHIPPED | OUTPATIENT
Start: 2020-01-10 | End: 2020-08-19

## 2020-01-10 RX ORDER — CARVEDILOL 3.12 MG/1
3.12 TABLET ORAL 2 TIMES DAILY
Qty: 180 TABLET | Refills: 1 | Status: SHIPPED | OUTPATIENT
Start: 2020-01-10 | End: 2020-08-19

## 2020-01-10 RX ORDER — AMLODIPINE BESYLATE 10 MG/1
10 TABLET ORAL DAILY
Qty: 90 TABLET | Refills: 1 | Status: SHIPPED | OUTPATIENT
Start: 2020-01-10 | End: 2020-08-19

## 2020-01-10 RX ORDER — IRBESARTAN AND HYDROCHLOROTHIAZIDE 300; 12.5 MG/1; MG/1
1 TABLET, FILM COATED ORAL DAILY
Qty: 90 TABLET | Refills: 1 | Status: SHIPPED | OUTPATIENT
Start: 2020-01-10 | End: 2020-08-19

## 2020-01-10 RX ORDER — ROSUVASTATIN CALCIUM 20 MG/1
20 TABLET, COATED ORAL DAILY
Qty: 90 TABLET | Refills: 1 | Status: SHIPPED | OUTPATIENT
Start: 2020-01-10 | End: 2020-08-31

## 2020-01-10 NOTE — PROGRESS NOTES
Subjective:       Patient ID: Trudy Horvath is a 79 y.o. female.    Chief Complaint: Hypertension (4 mos ) and Hyperlipidemia  80 yo female with HTN, hyperlipidemia, LVH, diastolic dysfunction, macular degeneration and osteoporosis presents for f/u of chronic conditions.  Hypertension   This is a chronic problem. The current episode started more than 1 year ago. The problem is unchanged. Pertinent negatives include no blurred vision, chest pain, headaches, orthopnea, palpitations, peripheral edema, PND or shortness of breath.   Hyperlipidemia   This is a chronic problem. The current episode started more than 1 year ago. Pertinent negatives include no chest pain or shortness of breath.   Pt is sad due to the death of her sister. Pt's daughter was diagnosed with an ovarian cyst. Pt to have a biopsy in 2 weeks. Pt is concerned that her daughter may have cancer. Is excited that her granddaughter will have a baby in July 2020. This will be her first great grand child.  Pt is followed by Dr. Wilmer Hand for macular degeneration. Will have a f/u visit March 2020.  Pt has forgotten to bring her medications to the visit. Pt thinks that she is taking all her medications.  Results for orders placed or performed in visit on 01/07/20   Lipid panel   Result Value Ref Range    Cholesterol 183 120 - 199 mg/dL    Triglycerides 100 30 - 150 mg/dL    HDL 64 40 - 75 mg/dL    LDL Cholesterol 99.0 63.0 - 159.0 mg/dL    Hdl/Cholesterol Ratio 35.0 20.0 - 50.0 %    Total Cholesterol/HDL Ratio 2.9 2.0 - 5.0    Non-HDL Cholesterol 119 mg/dL   Comprehensive metabolic panel   Result Value Ref Range    Sodium 144 136 - 145 mmol/L    Potassium 3.9 3.5 - 5.1 mmol/L    Chloride 105 95 - 110 mmol/L    CO2 29 23 - 29 mmol/L    Glucose 114 (H) 70 - 110 mg/dL    BUN, Bld 19 8 - 23 mg/dL    Creatinine 0.9 0.5 - 1.4 mg/dL    Calcium 10.1 8.7 - 10.5 mg/dL    Total Protein 7.2 6.0 - 8.4 g/dL    Albumin 3.6 3.5 - 5.2 g/dL    Total Bilirubin 0.4 0.1 - 1.0 mg/dL     Alkaline Phosphatase 74 55 - 135 U/L    AST 17 10 - 40 U/L    ALT 12 10 - 44 U/L    Anion Gap 10 8 - 16 mmol/L    eGFR if African American >60.0 >60 mL/min/1.73 m^2    eGFR if non African American >60.0 >60 mL/min/1.73 m^2   CBC auto differential   Result Value Ref Range    WBC 9.26 3.90 - 12.70 K/uL    RBC 4.15 4.00 - 5.40 M/uL    Hemoglobin 11.9 (L) 12.0 - 16.0 g/dL    Hematocrit 39.0 37.0 - 48.5 %    Mean Corpuscular Volume 94 82 - 98 fL    Mean Corpuscular Hemoglobin 28.7 27.0 - 31.0 pg    Mean Corpuscular Hemoglobin Conc 30.5 (L) 32.0 - 36.0 g/dL    RDW 13.1 11.5 - 14.5 %    Platelets 391 (H) 150 - 350 K/uL    MPV 10.9 9.2 - 12.9 fL    Immature Granulocytes 0.2 0.0 - 0.5 %    Gran # (ANC) 7.1 1.8 - 7.7 K/uL    Immature Grans (Abs) 0.02 0.00 - 0.04 K/uL    Lymph # 1.3 1.0 - 4.8 K/uL    Mono # 0.7 0.3 - 1.0 K/uL    Eos # 0.1 0.0 - 0.5 K/uL    Baso # 0.05 0.00 - 0.20 K/uL    nRBC 0 0 /100 WBC    Gran% 76.7 (H) 38.0 - 73.0 %    Lymph% 13.9 (L) 18.0 - 48.0 %    Mono% 7.7 4.0 - 15.0 %    Eosinophil% 1.0 0.0 - 8.0 %    Basophil% 0.5 0.0 - 1.9 %    Differential Method Automated      Review of Systems   Eyes: Negative for blurred vision.   Respiratory: Negative for shortness of breath.    Cardiovascular: Negative for chest pain, palpitations, orthopnea and PND.   Neurological: Negative for headaches.       Objective:      Physical Exam   Constitutional: She appears well-developed and well-nourished. No distress.   HENT:   Head: Normocephalic and atraumatic.   Neck: Normal range of motion. Neck supple. No JVD present. No thyromegaly present.   Cardiovascular: Normal rate, regular rhythm and normal heart sounds.   Pulmonary/Chest: Effort normal and breath sounds normal. She has no wheezes. She has no rales.   Abdominal: Soft. Bowel sounds are normal. She exhibits no mass. There is no tenderness.   Lymphadenopathy:     She has no cervical adenopathy.   Skin: Skin is warm and dry. She is not diaphoretic.   Vitals  reviewed.      Assessment:         See plan  Plan:       Essential hypertension, benign: Stable  -     CBC auto differential; Future; Expected date: 07/10/2020  -     amLODIPine (NORVASC) 10 MG tablet; Take 1 tablet (10 mg total) by mouth once daily.  Dispense: 90 tablet; Refill: 1  -     carvedilol (COREG) 3.125 MG tablet; Take 1 tablet (3.125 mg total) by mouth 2 (two) times daily.  Dispense: 180 tablet; Refill: 1  -     irbesartan-hydrochlorothiazide (AVALIDE) 300-12.5 mg per tablet; Take 1 tablet by mouth once daily.  Dispense: 90 tablet; Refill: 1    Pure hypercholesterolemia: Stable  -     Lipid panel; Future; Expected date: 07/10/2020  -     Comprehensive metabolic panel; Future; Expected date: 07/10/2020  -     rosuvastatin (CRESTOR) 20 MG tablet; Take 1 tablet (20 mg total) by mouth once daily.  Dispense: 90 tablet; Refill: 1    Hypertensive left ventricular hypertrophy, without heart failure: Stable    Diastolic dysfunction: Stable    Hypokalemia: Stable  -     potassium chloride SA (KLOR-CON M20) 20 MEQ tablet; Take 1 tablet (20 mEq total) by mouth once daily.  Dispense: 90 tablet; Refill: 1    Body mass index (BMI) less than or equal to 19 in adult: Stable    Osteoporosis, post-menopausal: Stable    Exudative age-related macular degeneration of both eyes with active choroidal neovascularization: Stable    Abnormal fasting glucose  Pt advised to decrease intake of white bread, white rice, corn, potatoes, pasta and sugar.  -     Hemoglobin A1c; Future; Expected date: 07/10/2020    Colon cancer screening  -     Fecal Immunochemical Test (iFOBT); Future; Expected date: 01/10/2020    F/U in 6 months.

## 2020-03-02 ENCOUNTER — LAB VISIT (OUTPATIENT)
Dept: LAB | Facility: HOSPITAL | Age: 80
End: 2020-03-02
Attending: FAMILY MEDICINE
Payer: MEDICARE

## 2020-03-02 DIAGNOSIS — Z12.11 COLON CANCER SCREENING: ICD-10-CM

## 2020-03-02 PROCEDURE — 82274 ASSAY TEST FOR BLOOD FECAL: CPT

## 2020-03-04 LAB — HEMOCCULT STL QL IA: NEGATIVE

## 2020-07-17 DIAGNOSIS — Z71.89 COMPLEX CARE COORDINATION: ICD-10-CM

## 2020-08-19 ENCOUNTER — TELEPHONE (OUTPATIENT)
Dept: FAMILY MEDICINE | Facility: CLINIC | Age: 80
End: 2020-08-19

## 2020-08-19 DIAGNOSIS — I10 ESSENTIAL HYPERTENSION, BENIGN: ICD-10-CM

## 2020-08-19 DIAGNOSIS — E87.6 HYPOKALEMIA: ICD-10-CM

## 2020-08-19 DIAGNOSIS — E78.00 PURE HYPERCHOLESTEROLEMIA: Primary | ICD-10-CM

## 2020-08-19 RX ORDER — CARVEDILOL 3.12 MG/1
TABLET ORAL
Qty: 180 TABLET | Refills: 0 | Status: SHIPPED | OUTPATIENT
Start: 2020-08-19 | End: 2021-03-26 | Stop reason: SDUPTHER

## 2020-08-19 RX ORDER — AMLODIPINE BESYLATE 10 MG/1
TABLET ORAL
Qty: 90 TABLET | Refills: 0 | Status: SHIPPED | OUTPATIENT
Start: 2020-08-19 | End: 2021-03-01

## 2020-08-19 RX ORDER — POTASSIUM CHLORIDE 20 MEQ/1
TABLET, EXTENDED RELEASE ORAL
Qty: 90 TABLET | Refills: 0 | Status: SHIPPED | OUTPATIENT
Start: 2020-08-19 | End: 2021-03-26

## 2020-08-19 RX ORDER — IRBESARTAN AND HYDROCHLOROTHIAZIDE 300; 12.5 MG/1; MG/1
TABLET, FILM COATED ORAL
Qty: 90 TABLET | Refills: 0 | Status: SHIPPED | OUTPATIENT
Start: 2020-08-19 | End: 2020-08-25 | Stop reason: SDUPTHER

## 2020-08-19 NOTE — TELEPHONE ENCOUNTER
Please call pt to schedule a follow-up appointment within 2-4 weeks with the following labs prior to the visit: CMP and lipid profile

## 2020-08-25 DIAGNOSIS — I10 ESSENTIAL HYPERTENSION, BENIGN: ICD-10-CM

## 2020-08-25 RX ORDER — IRBESARTAN AND HYDROCHLOROTHIAZIDE 300; 12.5 MG/1; MG/1
1 TABLET, FILM COATED ORAL DAILY
Qty: 30 TABLET | Refills: 0 | Status: SHIPPED | OUTPATIENT
Start: 2020-08-25 | End: 2020-11-06

## 2020-08-29 DIAGNOSIS — E78.00 PURE HYPERCHOLESTEROLEMIA: ICD-10-CM

## 2020-08-31 ENCOUNTER — TELEPHONE (OUTPATIENT)
Dept: FAMILY MEDICINE | Facility: CLINIC | Age: 80
End: 2020-08-31

## 2020-08-31 RX ORDER — ROSUVASTATIN CALCIUM 20 MG/1
TABLET, COATED ORAL
Qty: 90 TABLET | Refills: 0 | Status: SHIPPED | OUTPATIENT
Start: 2020-08-31 | End: 2021-03-26 | Stop reason: SDUPTHER

## 2020-08-31 NOTE — TELEPHONE ENCOUNTER
severals attempt to call patient.  A letter was send to patient's address to call the office and set up an appointment with Dr Guzman.

## 2020-11-06 DIAGNOSIS — I10 ESSENTIAL HYPERTENSION, BENIGN: ICD-10-CM

## 2020-11-06 RX ORDER — IRBESARTAN AND HYDROCHLOROTHIAZIDE 300; 12.5 MG/1; MG/1
TABLET, FILM COATED ORAL
Qty: 90 TABLET | Refills: 0 | Status: SHIPPED | OUTPATIENT
Start: 2020-11-06 | End: 2021-01-25

## 2021-01-25 DIAGNOSIS — I10 ESSENTIAL HYPERTENSION, BENIGN: ICD-10-CM

## 2021-01-25 RX ORDER — IRBESARTAN AND HYDROCHLOROTHIAZIDE 300; 12.5 MG/1; MG/1
TABLET, FILM COATED ORAL
Qty: 90 TABLET | Refills: 0 | Status: SHIPPED | OUTPATIENT
Start: 2021-01-25 | End: 2021-03-26 | Stop reason: SDUPTHER

## 2021-03-01 ENCOUNTER — TELEPHONE (OUTPATIENT)
Dept: FAMILY MEDICINE | Facility: CLINIC | Age: 81
End: 2021-03-01

## 2021-03-01 DIAGNOSIS — I10 ESSENTIAL HYPERTENSION, BENIGN: Primary | ICD-10-CM

## 2021-03-01 DIAGNOSIS — E78.00 PURE HYPERCHOLESTEROLEMIA: ICD-10-CM

## 2021-03-01 DIAGNOSIS — I10 ESSENTIAL HYPERTENSION, BENIGN: ICD-10-CM

## 2021-03-01 RX ORDER — AMLODIPINE BESYLATE 10 MG/1
TABLET ORAL
Qty: 90 TABLET | Refills: 0 | Status: SHIPPED | OUTPATIENT
Start: 2021-03-01 | End: 2021-03-26 | Stop reason: SDUPTHER

## 2021-03-22 ENCOUNTER — LAB VISIT (OUTPATIENT)
Dept: LAB | Facility: HOSPITAL | Age: 81
End: 2021-03-22
Attending: FAMILY MEDICINE
Payer: MEDICARE

## 2021-03-22 DIAGNOSIS — I10 ESSENTIAL HYPERTENSION, BENIGN: ICD-10-CM

## 2021-03-22 DIAGNOSIS — E78.00 PURE HYPERCHOLESTEROLEMIA: ICD-10-CM

## 2021-03-22 LAB
BASOPHILS # BLD AUTO: 0.05 K/UL (ref 0–0.2)
BASOPHILS NFR BLD: 0.8 % (ref 0–1.9)
DIFFERENTIAL METHOD: ABNORMAL
EOSINOPHIL # BLD AUTO: 0.1 K/UL (ref 0–0.5)
EOSINOPHIL NFR BLD: 1.8 % (ref 0–8)
ERYTHROCYTE [DISTWIDTH] IN BLOOD BY AUTOMATED COUNT: 12.7 % (ref 11.5–14.5)
HCT VFR BLD AUTO: 35.9 % (ref 37–48.5)
HGB BLD-MCNC: 11.7 G/DL (ref 12–16)
IMM GRANULOCYTES # BLD AUTO: 0.02 K/UL (ref 0–0.04)
IMM GRANULOCYTES NFR BLD AUTO: 0.3 % (ref 0–0.5)
LYMPHOCYTES # BLD AUTO: 1.3 K/UL (ref 1–4.8)
LYMPHOCYTES NFR BLD: 21.5 % (ref 18–48)
MCH RBC QN AUTO: 30.4 PG (ref 27–31)
MCHC RBC AUTO-ENTMCNC: 32.6 G/DL (ref 32–36)
MCV RBC AUTO: 93 FL (ref 82–98)
MONOCYTES # BLD AUTO: 0.6 K/UL (ref 0.3–1)
MONOCYTES NFR BLD: 10.3 % (ref 4–15)
NEUTROPHILS # BLD AUTO: 4.1 K/UL (ref 1.8–7.7)
NEUTROPHILS NFR BLD: 65.3 % (ref 38–73)
NRBC BLD-RTO: 0 /100 WBC
PLATELET # BLD AUTO: 316 K/UL (ref 150–350)
PMV BLD AUTO: 10.7 FL (ref 9.2–12.9)
RBC # BLD AUTO: 3.85 M/UL (ref 4–5.4)
WBC # BLD AUTO: 6.24 K/UL (ref 3.9–12.7)

## 2021-03-22 PROCEDURE — 80053 COMPREHEN METABOLIC PANEL: CPT | Performed by: FAMILY MEDICINE

## 2021-03-22 PROCEDURE — 80061 LIPID PANEL: CPT | Performed by: FAMILY MEDICINE

## 2021-03-22 PROCEDURE — 36415 COLL VENOUS BLD VENIPUNCTURE: CPT | Mod: PO | Performed by: FAMILY MEDICINE

## 2021-03-22 PROCEDURE — 85025 COMPLETE CBC W/AUTO DIFF WBC: CPT | Performed by: FAMILY MEDICINE

## 2021-03-23 LAB
ALBUMIN SERPL BCP-MCNC: 3.7 G/DL (ref 3.5–5.2)
ALP SERPL-CCNC: 54 U/L (ref 55–135)
ALT SERPL W/O P-5'-P-CCNC: 11 U/L (ref 10–44)
ANION GAP SERPL CALC-SCNC: 11 MMOL/L (ref 8–16)
AST SERPL-CCNC: 17 U/L (ref 10–40)
BILIRUB SERPL-MCNC: 0.6 MG/DL (ref 0.1–1)
BUN SERPL-MCNC: 24 MG/DL (ref 8–23)
CALCIUM SERPL-MCNC: 9.1 MG/DL (ref 8.7–10.5)
CHLORIDE SERPL-SCNC: 104 MMOL/L (ref 95–110)
CHOLEST SERPL-MCNC: 171 MG/DL (ref 120–199)
CHOLEST/HDLC SERPL: 2.9 {RATIO} (ref 2–5)
CO2 SERPL-SCNC: 28 MMOL/L (ref 23–29)
CREAT SERPL-MCNC: 1 MG/DL (ref 0.5–1.4)
EST. GFR  (AFRICAN AMERICAN): >60 ML/MIN/1.73 M^2
EST. GFR  (NON AFRICAN AMERICAN): 52.6 ML/MIN/1.73 M^2
GLUCOSE SERPL-MCNC: 97 MG/DL (ref 70–110)
HDLC SERPL-MCNC: 60 MG/DL (ref 40–75)
HDLC SERPL: 35.1 % (ref 20–50)
LDLC SERPL CALC-MCNC: 94.6 MG/DL (ref 63–159)
NONHDLC SERPL-MCNC: 111 MG/DL
POTASSIUM SERPL-SCNC: 3.3 MMOL/L (ref 3.5–5.1)
PROT SERPL-MCNC: 7 G/DL (ref 6–8.4)
SODIUM SERPL-SCNC: 143 MMOL/L (ref 136–145)
TRIGL SERPL-MCNC: 82 MG/DL (ref 30–150)

## 2021-03-26 ENCOUNTER — SPECIALTY PHARMACY (OUTPATIENT)
Dept: PHARMACY | Facility: CLINIC | Age: 81
End: 2021-03-26

## 2021-03-26 ENCOUNTER — OFFICE VISIT (OUTPATIENT)
Dept: FAMILY MEDICINE | Facility: CLINIC | Age: 81
End: 2021-03-26
Payer: MEDICARE

## 2021-03-26 VITALS
DIASTOLIC BLOOD PRESSURE: 60 MMHG | TEMPERATURE: 97 F | WEIGHT: 102.31 LBS | HEIGHT: 60 IN | HEART RATE: 91 BPM | OXYGEN SATURATION: 98 % | BODY MASS INDEX: 20.09 KG/M2 | SYSTOLIC BLOOD PRESSURE: 118 MMHG

## 2021-03-26 DIAGNOSIS — E78.00 PURE HYPERCHOLESTEROLEMIA: ICD-10-CM

## 2021-03-26 DIAGNOSIS — H91.8X3 OTHER SPECIFIED HEARING LOSS OF BOTH EARS: ICD-10-CM

## 2021-03-26 DIAGNOSIS — R92.8 OTHER ABNORMAL AND INCONCLUSIVE FINDINGS ON DIAGNOSTIC IMAGING OF BREAST: ICD-10-CM

## 2021-03-26 DIAGNOSIS — R25.1 TREMOR OF LEFT HAND: ICD-10-CM

## 2021-03-26 DIAGNOSIS — I10 ESSENTIAL HYPERTENSION, BENIGN: Primary | ICD-10-CM

## 2021-03-26 DIAGNOSIS — N60.09 CYST OF BREAST, UNSPECIFIED LATERALITY: ICD-10-CM

## 2021-03-26 DIAGNOSIS — I11.9 HYPERTENSIVE LEFT VENTRICULAR HYPERTROPHY, WITHOUT HEART FAILURE: ICD-10-CM

## 2021-03-26 DIAGNOSIS — M81.0 OSTEOPOROSIS, POST-MENOPAUSAL: ICD-10-CM

## 2021-03-26 DIAGNOSIS — H35.3231 EXUDATIVE AGE-RELATED MACULAR DEGENERATION OF BOTH EYES WITH ACTIVE CHOROIDAL NEOVASCULARIZATION: ICD-10-CM

## 2021-03-26 DIAGNOSIS — I51.89 DIASTOLIC DYSFUNCTION: ICD-10-CM

## 2021-03-26 DIAGNOSIS — E87.6 HYPOKALEMIA: ICD-10-CM

## 2021-03-26 DIAGNOSIS — Z12.11 COLON CANCER SCREENING: ICD-10-CM

## 2021-03-26 PROCEDURE — 99215 PR OFFICE/OUTPT VISIT, EST, LEVL V, 40-54 MIN: ICD-10-PCS | Mod: S$PBB,,, | Performed by: FAMILY MEDICINE

## 2021-03-26 PROCEDURE — 99215 OFFICE O/P EST HI 40 MIN: CPT | Mod: PBBFAC,PO | Performed by: FAMILY MEDICINE

## 2021-03-26 PROCEDURE — 99999 PR PBB SHADOW E&M-EST. PATIENT-LVL V: CPT | Mod: PBBFAC,,, | Performed by: FAMILY MEDICINE

## 2021-03-26 PROCEDURE — 99999 PR PBB SHADOW E&M-EST. PATIENT-LVL V: ICD-10-PCS | Mod: PBBFAC,,, | Performed by: FAMILY MEDICINE

## 2021-03-26 PROCEDURE — 99215 OFFICE O/P EST HI 40 MIN: CPT | Mod: S$PBB,,, | Performed by: FAMILY MEDICINE

## 2021-03-26 RX ORDER — POTASSIUM CHLORIDE 20 MEQ/1
20 TABLET, EXTENDED RELEASE ORAL 2 TIMES DAILY
Qty: 180 TABLET | Refills: 1 | Status: SHIPPED | OUTPATIENT
Start: 2021-03-26 | End: 2021-09-28

## 2021-03-26 RX ORDER — NAPROXEN SODIUM 220 MG/1
81 TABLET, FILM COATED ORAL DAILY
Qty: 30 TABLET | Refills: 83 | Status: SHIPPED | OUTPATIENT
Start: 2021-03-26 | End: 2023-04-25 | Stop reason: SDUPTHER

## 2021-03-26 RX ORDER — AMLODIPINE BESYLATE 10 MG/1
10 TABLET ORAL DAILY
Qty: 90 TABLET | Refills: 1 | Status: SHIPPED | OUTPATIENT
Start: 2021-03-26 | End: 2021-12-03

## 2021-03-26 RX ORDER — IRBESARTAN AND HYDROCHLOROTHIAZIDE 300; 12.5 MG/1; MG/1
1 TABLET, FILM COATED ORAL DAILY
Qty: 90 TABLET | Refills: 1 | Status: SHIPPED | OUTPATIENT
Start: 2021-03-26 | End: 2021-09-28

## 2021-03-26 RX ORDER — ROSUVASTATIN CALCIUM 20 MG/1
20 TABLET, COATED ORAL DAILY
Qty: 90 TABLET | Refills: 1 | Status: SHIPPED | OUTPATIENT
Start: 2021-03-26 | End: 2021-09-28

## 2021-03-26 RX ORDER — CARVEDILOL 3.12 MG/1
3.12 TABLET ORAL 2 TIMES DAILY
Qty: 180 TABLET | Refills: 1 | Status: SHIPPED | OUTPATIENT
Start: 2021-03-26 | End: 2021-09-28

## 2021-04-13 ENCOUNTER — HOSPITAL ENCOUNTER (OUTPATIENT)
Dept: RADIOLOGY | Facility: HOSPITAL | Age: 81
Discharge: HOME | End: 2021-04-13
Attending: FAMILY MEDICINE
Payer: MEDICARE

## 2021-04-13 DIAGNOSIS — R92.8 OTHER ABNORMAL AND INCONCLUSIVE FINDINGS ON DIAGNOSTIC IMAGING OF BREAST: ICD-10-CM

## 2021-04-13 DIAGNOSIS — M81.0 OSTEOPOROSIS, POST-MENOPAUSAL: ICD-10-CM

## 2021-04-13 DIAGNOSIS — N60.09 CYST OF BREAST, UNSPECIFIED LATERALITY: ICD-10-CM

## 2021-04-13 DIAGNOSIS — Z87.2 HISTORY OF CYST OF BREAST: ICD-10-CM

## 2021-04-13 PROCEDURE — 77066 DX MAMMO INCL CAD BI: CPT | Mod: 26,,, | Performed by: RADIOLOGY

## 2021-04-13 PROCEDURE — 77080 DXA BONE DENSITY AXIAL: CPT | Mod: 26,,, | Performed by: RADIOLOGY

## 2021-04-13 PROCEDURE — 77062 BREAST TOMOSYNTHESIS BI: CPT | Mod: 26,,, | Performed by: RADIOLOGY

## 2021-04-13 PROCEDURE — 77080 DEXA BONE DENSITY SPINE HIP: ICD-10-PCS | Mod: 26,,, | Performed by: RADIOLOGY

## 2021-04-13 PROCEDURE — 77062 MAMMO DIGITAL DIAGNOSTIC BILAT WITH TOMO: ICD-10-PCS | Mod: 26,,, | Performed by: RADIOLOGY

## 2021-04-13 PROCEDURE — 77066 MAMMO DIGITAL DIAGNOSTIC BILAT WITH TOMO: ICD-10-PCS | Mod: 26,,, | Performed by: RADIOLOGY

## 2021-04-13 PROCEDURE — 77066 DX MAMMO INCL CAD BI: CPT | Mod: TC

## 2021-04-13 PROCEDURE — 77080 DXA BONE DENSITY AXIAL: CPT | Mod: TC

## 2021-04-14 ENCOUNTER — TELEPHONE (OUTPATIENT)
Dept: ADMINISTRATIVE | Facility: OTHER | Age: 81
End: 2021-04-14

## 2021-04-15 ENCOUNTER — TELEPHONE (OUTPATIENT)
Dept: ADMINISTRATIVE | Facility: OTHER | Age: 81
End: 2021-04-15

## 2021-05-03 ENCOUNTER — TELEPHONE (OUTPATIENT)
Dept: FAMILY MEDICINE | Facility: CLINIC | Age: 81
End: 2021-05-03

## 2021-05-07 ENCOUNTER — HOSPITAL ENCOUNTER (OUTPATIENT)
Dept: RADIOLOGY | Facility: HOSPITAL | Age: 81
Discharge: HOME | End: 2021-05-07
Attending: FAMILY MEDICINE
Payer: MEDICARE

## 2021-05-07 DIAGNOSIS — R92.8 ABNORMAL MAMMOGRAM: ICD-10-CM

## 2021-05-07 PROCEDURE — 76642 ULTRASOUND BREAST LIMITED: CPT | Mod: TC,RT

## 2021-05-07 PROCEDURE — 77061 BREAST TOMOSYNTHESIS UNI: CPT | Mod: TC,RT

## 2021-05-07 PROCEDURE — 76642 US BREAST RIGHT LIMITED: ICD-10-PCS | Mod: 26,RT,, | Performed by: RADIOLOGY

## 2021-05-07 PROCEDURE — 77061 BREAST TOMOSYNTHESIS UNI: CPT | Mod: 26,RT,, | Performed by: RADIOLOGY

## 2021-05-07 PROCEDURE — 76642 ULTRASOUND BREAST LIMITED: CPT | Mod: 26,RT,, | Performed by: RADIOLOGY

## 2021-05-07 PROCEDURE — 77061 MAMMO DIGITAL DIAGNOSTIC RIGHT WITH TOMO: ICD-10-PCS | Mod: 26,RT,, | Performed by: RADIOLOGY

## 2021-05-07 PROCEDURE — 77065 DX MAMMO INCL CAD UNI: CPT | Mod: 26,RT,, | Performed by: RADIOLOGY

## 2021-05-07 PROCEDURE — 77065 MAMMO DIGITAL DIAGNOSTIC RIGHT WITH TOMO: ICD-10-PCS | Mod: 26,RT,, | Performed by: RADIOLOGY

## 2021-05-10 ENCOUNTER — TELEPHONE (OUTPATIENT)
Dept: INTERNAL MEDICINE | Facility: CLINIC | Age: 81
End: 2021-05-10

## 2021-05-14 ENCOUNTER — TELEPHONE (OUTPATIENT)
Dept: FAMILY MEDICINE | Facility: CLINIC | Age: 81
End: 2021-05-14

## 2021-05-14 DIAGNOSIS — R92.8 ABNORMAL MAMMOGRAM OF RIGHT BREAST: Primary | ICD-10-CM

## 2021-05-18 ENCOUNTER — TELEPHONE (OUTPATIENT)
Dept: FAMILY MEDICINE | Facility: CLINIC | Age: 81
End: 2021-05-18

## 2021-05-18 DIAGNOSIS — R92.8 ABNORMAL MAMMOGRAM OF RIGHT BREAST: Primary | ICD-10-CM

## 2021-06-08 ENCOUNTER — TELEPHONE (OUTPATIENT)
Dept: FAMILY MEDICINE | Facility: CLINIC | Age: 81
End: 2021-06-08

## 2021-06-10 ENCOUNTER — DOCUMENTATION ONLY (OUTPATIENT)
Dept: FAMILY MEDICINE | Facility: CLINIC | Age: 81
End: 2021-06-10

## 2021-09-28 ENCOUNTER — TELEPHONE (OUTPATIENT)
Dept: FAMILY MEDICINE | Facility: CLINIC | Age: 81
End: 2021-09-28

## 2021-09-28 DIAGNOSIS — E87.6 HYPOKALEMIA: ICD-10-CM

## 2021-09-28 DIAGNOSIS — I10 ESSENTIAL HYPERTENSION, BENIGN: ICD-10-CM

## 2021-09-28 DIAGNOSIS — E78.00 PURE HYPERCHOLESTEROLEMIA: ICD-10-CM

## 2021-09-28 RX ORDER — CARVEDILOL 3.12 MG/1
TABLET ORAL
Qty: 180 TABLET | Refills: 0 | Status: SHIPPED | OUTPATIENT
Start: 2021-09-28 | End: 2021-12-11

## 2021-09-28 RX ORDER — POTASSIUM CHLORIDE 20 MEQ/1
TABLET, EXTENDED RELEASE ORAL
Qty: 180 TABLET | Refills: 0 | Status: SHIPPED | OUTPATIENT
Start: 2021-09-28 | End: 2021-12-11

## 2021-09-28 RX ORDER — ROSUVASTATIN CALCIUM 20 MG/1
TABLET, COATED ORAL
Qty: 90 TABLET | Refills: 0 | Status: SHIPPED | OUTPATIENT
Start: 2021-09-28 | End: 2021-12-11

## 2021-09-28 RX ORDER — IRBESARTAN AND HYDROCHLOROTHIAZIDE 300; 12.5 MG/1; MG/1
TABLET, FILM COATED ORAL
Qty: 90 TABLET | Refills: 0 | Status: SHIPPED | OUTPATIENT
Start: 2021-09-28 | End: 2022-02-25 | Stop reason: SDUPTHER

## 2021-10-01 ENCOUNTER — PES CALL (OUTPATIENT)
Dept: ADMINISTRATIVE | Facility: CLINIC | Age: 81
End: 2021-10-01

## 2021-12-03 DIAGNOSIS — I10 ESSENTIAL HYPERTENSION, BENIGN: ICD-10-CM

## 2021-12-03 RX ORDER — AMLODIPINE BESYLATE 10 MG/1
TABLET ORAL
Qty: 90 TABLET | Refills: 0 | Status: SHIPPED | OUTPATIENT
Start: 2021-12-03 | End: 2022-02-25 | Stop reason: SDUPTHER

## 2021-12-09 DIAGNOSIS — I10 ESSENTIAL HYPERTENSION, BENIGN: ICD-10-CM

## 2021-12-09 DIAGNOSIS — E78.00 PURE HYPERCHOLESTEROLEMIA: ICD-10-CM

## 2021-12-09 DIAGNOSIS — E87.6 HYPOKALEMIA: ICD-10-CM

## 2021-12-11 RX ORDER — CARVEDILOL 3.12 MG/1
TABLET ORAL
Qty: 180 TABLET | Refills: 1 | Status: SHIPPED | OUTPATIENT
Start: 2021-12-11 | End: 2022-02-25 | Stop reason: SDUPTHER

## 2021-12-11 RX ORDER — ROSUVASTATIN CALCIUM 20 MG/1
TABLET, COATED ORAL
Qty: 90 TABLET | Refills: 1 | Status: SHIPPED | OUTPATIENT
Start: 2021-12-11 | End: 2022-02-25 | Stop reason: SDUPTHER

## 2021-12-12 ENCOUNTER — TELEPHONE (OUTPATIENT)
Dept: FAMILY MEDICINE | Facility: CLINIC | Age: 81
End: 2021-12-12
Payer: OTHER GOVERNMENT

## 2021-12-12 RX ORDER — POTASSIUM CHLORIDE 20 MEQ/1
TABLET, EXTENDED RELEASE ORAL
Qty: 180 TABLET | Refills: 0 | Status: SHIPPED | OUTPATIENT
Start: 2021-12-12 | End: 2022-02-25 | Stop reason: SDUPTHER

## 2022-02-07 DIAGNOSIS — I10 ESSENTIAL HYPERTENSION, BENIGN: ICD-10-CM

## 2022-02-07 DIAGNOSIS — E78.00 PURE HYPERCHOLESTEROLEMIA: ICD-10-CM

## 2022-02-07 DIAGNOSIS — I51.89 DIASTOLIC DYSFUNCTION: ICD-10-CM

## 2022-02-07 DIAGNOSIS — I11.9 HYPERTENSIVE LEFT VENTRICULAR HYPERTROPHY, WITHOUT HEART FAILURE: ICD-10-CM

## 2022-02-07 RX ORDER — ROSUVASTATIN CALCIUM 20 MG/1
20 TABLET, COATED ORAL DAILY
Qty: 90 TABLET | Refills: 1 | OUTPATIENT
Start: 2022-02-07

## 2022-02-07 RX ORDER — CARVEDILOL 3.12 MG/1
3.12 TABLET ORAL 2 TIMES DAILY
Qty: 180 TABLET | Refills: 1 | OUTPATIENT
Start: 2022-02-07

## 2022-02-07 RX ORDER — AMLODIPINE BESYLATE 10 MG/1
10 TABLET ORAL DAILY
Qty: 90 TABLET | Refills: 0 | OUTPATIENT
Start: 2022-02-07

## 2022-02-07 NOTE — TELEPHONE ENCOUNTER
No new care gaps identified.  Powered by [employer]. Reference number: [id number].   2/07/2022 1:08:27 PM CST

## 2022-02-11 DIAGNOSIS — I10 ESSENTIAL HYPERTENSION, BENIGN: ICD-10-CM

## 2022-02-11 RX ORDER — IRBESARTAN AND HYDROCHLOROTHIAZIDE 300; 12.5 MG/1; MG/1
1 TABLET, FILM COATED ORAL DAILY
Qty: 90 TABLET | Refills: 0 | OUTPATIENT
Start: 2022-02-11

## 2022-02-11 NOTE — TELEPHONE ENCOUNTER
Care Due:                  Date            Visit Type   Department     Provider  --------------------------------------------------------------------------------                                EP -                              PRIMARY      [facility]  Last Visit: 03-      CARE ([facility])   [facility]       Maria C Guzman  Next Visit: None Scheduled  None         None Found                                                            Last  Test          Frequency    Reason                     Performed    Due Date  --------------------------------------------------------------------------------    CMP.........  12 months..  irbesartan-hydrochlorothi  03- 03-                             azide, rosuvastatin......    Lipid Panel.  12 months..  rosuvastatin.............  03- 03-    Powered by [employer]. Reference number: [id number].   2/11/2022 10:14:10 AM CST

## 2022-02-16 DIAGNOSIS — I10 ESSENTIAL HYPERTENSION, BENIGN: ICD-10-CM

## 2022-02-16 RX ORDER — AMLODIPINE BESYLATE 10 MG/1
TABLET ORAL
Qty: 90 TABLET | Refills: 3 | OUTPATIENT
Start: 2022-02-16

## 2022-02-16 RX ORDER — IRBESARTAN AND HYDROCHLOROTHIAZIDE 300; 12.5 MG/1; MG/1
1 TABLET, FILM COATED ORAL DAILY
Qty: 90 TABLET | Refills: 0 | OUTPATIENT
Start: 2022-02-16

## 2022-02-16 NOTE — TELEPHONE ENCOUNTER
No new care gaps identified.  Powered by [employer]. Reference number: [id number].   2/16/2022 8:36:04 AM CST

## 2022-02-16 NOTE — TELEPHONE ENCOUNTER
No new care gaps identified.  Powered by [employer] by [employer]. Reference number: [id number].   2/16/2022 1:28:39 AM CST

## 2022-02-16 NOTE — TELEPHONE ENCOUNTER
Rosana CASTILLO. Previously Denied   Refill Authorization Note   Trudy Horvath  is requesting a refill authorization.  Brief Assessment and Rationale for Refill:  Quick Discontinue  Medication Therapy Plan:       Medication Reconciliation Completed:  No      Comments:   Pended Medication(s)       Requested Prescriptions     Refused Prescriptions Disp Refills    amLODIPine (NORVASC) 10 MG tablet [Pharmacy Med Name: AMLODIPINE BESYLATE TABS 10MG] 90 tablet 3     Sig: TAKE 1 TABLET DAILY     Refused By: AURORA BARRIOS     Reason for Refusal: Request already responded to by other means (e.g. phone or fax)        Duplicate Pended Encounter(s)/ Last Prescribed Details: (includes pharmacy & prescriber details)   Ordering User:  Maria C Guzman MD            Pharmacy    [facility] - [address] [address]   Phone:  [phone]  Fax:  [phone]   CLAUDIA #:  --   LOVE Reason: --       Outpatient Medication Detail     Disp Refills Start End LOVE   amLODIPine (NORVASC) 10 MG tablet 90 tablet 0 2/7/2022  No   Request refused: Patient needs an appointment   Sig - Route: Take 1 tablet (10 mg total) by mouth once daily. - Oral   Class: Normal   Notes to Pharmacy: .   Order: [id number]   Date/Time Signed: 2/7/2022 13:36         Ordering Encounter Report    Associated Reports   View Encounter                Note composed:8:12 AM 02/16/2022

## 2022-02-25 ENCOUNTER — TELEPHONE (OUTPATIENT)
Dept: FAMILY MEDICINE | Facility: CLINIC | Age: 82
End: 2022-02-25
Payer: OTHER GOVERNMENT

## 2022-02-25 DIAGNOSIS — I10 ESSENTIAL HYPERTENSION, BENIGN: ICD-10-CM

## 2022-02-25 DIAGNOSIS — E78.00 PURE HYPERCHOLESTEROLEMIA: ICD-10-CM

## 2022-02-25 DIAGNOSIS — E87.6 HYPOKALEMIA: ICD-10-CM

## 2022-02-25 RX ORDER — IRBESARTAN AND HYDROCHLOROTHIAZIDE 300; 12.5 MG/1; MG/1
1 TABLET, FILM COATED ORAL DAILY
Qty: 90 TABLET | Refills: 0 | OUTPATIENT
Start: 2022-02-25

## 2022-02-25 RX ORDER — IRBESARTAN AND HYDROCHLOROTHIAZIDE 300; 12.5 MG/1; MG/1
1 TABLET, FILM COATED ORAL DAILY
Qty: 90 TABLET | Refills: 0 | Status: SHIPPED | OUTPATIENT
Start: 2022-02-25 | End: 2022-05-18

## 2022-02-25 RX ORDER — POTASSIUM CHLORIDE 20 MEQ/1
20 TABLET, EXTENDED RELEASE ORAL 2 TIMES DAILY
Qty: 180 TABLET | Refills: 0 | Status: SHIPPED | OUTPATIENT
Start: 2022-02-25 | End: 2022-05-23 | Stop reason: SDUPTHER

## 2022-02-25 RX ORDER — ROSUVASTATIN CALCIUM 20 MG/1
20 TABLET, COATED ORAL DAILY
Qty: 90 TABLET | Refills: 0 | Status: SHIPPED | OUTPATIENT
Start: 2022-02-25 | End: 2022-05-23 | Stop reason: SDUPTHER

## 2022-02-25 RX ORDER — AMLODIPINE BESYLATE 10 MG/1
10 TABLET ORAL DAILY
Qty: 90 TABLET | Refills: 0 | Status: SHIPPED | OUTPATIENT
Start: 2022-02-25 | End: 2022-05-23 | Stop reason: SDUPTHER

## 2022-02-25 RX ORDER — CARVEDILOL 3.12 MG/1
3.12 TABLET ORAL 2 TIMES DAILY
Qty: 180 TABLET | Refills: 0 | Status: SHIPPED | OUTPATIENT
Start: 2022-02-25 | End: 2022-05-23 | Stop reason: SDUPTHER

## 2022-02-25 NOTE — TELEPHONE ENCOUNTER
No new care gaps identified.  Powered by [employer] by [employer]. Reference number: [id number].   2/25/2022 9:20:45 AM CST

## 2022-02-25 NOTE — TELEPHONE ENCOUNTER
Please call pt to schedule an appointment to f/u her chronic conditions within the next 2-4 weeks. Pt last seen 11 months ago.

## 2022-03-25 ENCOUNTER — TELEPHONE (OUTPATIENT)
Dept: FAMILY MEDICINE | Facility: CLINIC | Age: 82
End: 2022-03-25

## 2022-03-25 ENCOUNTER — OFFICE VISIT (OUTPATIENT)
Dept: FAMILY MEDICINE | Facility: CLINIC | Age: 82
End: 2022-03-25
Payer: MEDICARE

## 2022-03-25 VITALS
SYSTOLIC BLOOD PRESSURE: 152 MMHG | HEIGHT: 60 IN | BODY MASS INDEX: 20.26 KG/M2 | WEIGHT: 103.19 LBS | HEART RATE: 87 BPM | OXYGEN SATURATION: 98 % | DIASTOLIC BLOOD PRESSURE: 70 MMHG

## 2022-03-25 DIAGNOSIS — H66.92 ACUTE LEFT OTITIS MEDIA: Primary | ICD-10-CM

## 2022-03-25 PROCEDURE — 99214 OFFICE O/P EST MOD 30 MIN: CPT | Mod: S$PBB,,, | Performed by: NURSE PRACTITIONER

## 2022-03-25 PROCEDURE — 99214 OFFICE O/P EST MOD 30 MIN: CPT | Mod: PBBFAC,PO | Performed by: NURSE PRACTITIONER

## 2022-03-25 PROCEDURE — 99214 PR OFFICE/OUTPT VISIT, EST, LEVL IV, 30-39 MIN: ICD-10-PCS | Mod: S$PBB,,, | Performed by: NURSE PRACTITIONER

## 2022-03-25 PROCEDURE — 99999 PR PBB SHADOW E&M-EST. PATIENT-LVL IV: CPT | Mod: PBBFAC,,, | Performed by: NURSE PRACTITIONER

## 2022-03-25 PROCEDURE — 99999 PR PBB SHADOW E&M-EST. PATIENT-LVL IV: ICD-10-PCS | Mod: PBBFAC,,, | Performed by: NURSE PRACTITIONER

## 2022-03-25 RX ORDER — AMOXICILLIN AND CLAVULANATE POTASSIUM 875; 125 MG/1; MG/1
1 TABLET, FILM COATED ORAL EVERY 12 HOURS
Qty: 14 TABLET | Refills: 0 | Status: SHIPPED | OUTPATIENT
Start: 2022-03-25 | End: 2022-04-01

## 2022-03-25 NOTE — TELEPHONE ENCOUNTER
----- Message from Aileen Frye sent at 3/25/2022  4:10 PM CDT -----  Needs advice from nurse:      Who Called:pt  Regarding:BP is 135/80 at home  Would the patient rather a call back or VIA [facility]?  Best Call Back number:[phone]  Additional Info:

## 2022-03-25 NOTE — PROGRESS NOTES
Subjective:       Patient ID: Trudy Horvath is a 81 y.o. female.    Chief Complaint: Otalgia    This is a pleasant 82 yo female patient of Dr. Guzman who is new to me. She presents today with c/o left sided ear pain. PMH includes    Patient Active Problem List:     Essential hypertension, benign     Hyperlipidemia     Macular degeneration     Osteoporosis, post-menopausal     Body mass index (BMI) less than or equal to 19 in adult     Cyst of breast     Hypokalemia     Left ventricular hypertrophy due to hypertensive disease     Diastolic dysfunction     Hypercalcemia     Exudative age-related macular degeneration of both eyes with active choroidal neovascularization    BP elevated today, about the same with recheck. Denies chest pain, SOB, dyspnea, palpitations, visual changes, HA, dizziness, or N/V/D. Compliant with current medication regimen. Says she may have white coat syndrome as BP is typically WNL at home. Patient reports she has been experiencing left ear pain since yesterday that gradually worsened since onset. Was having a runny nose a few weeks ago and then started with left ear pain. Chronic hard of hearing. Denies fever, cough, nasal congestion. Felt a bit of a swollen gland to left side of neck. Traveling out of state in a few days for a vacation and is worried about her ear pain and the flight. See more below.    Otalgia   There is pain in the left ear. This is a new problem. The current episode started yesterday. The problem has been rapidly worsening. There has been no fever. The pain is moderate. Associated symptoms include rhinorrhea. Pertinent negatives include no abdominal pain, coughing, ear discharge, headaches, sore throat or vomiting. She has tried nothing for the symptoms. Her past medical history is significant for hearing loss.     Review of Systems   Constitutional: Negative for chills, fatigue and fever.   HENT: Positive for ear pain, postnasal drip and rhinorrhea. Negative for nasal  congestion, ear discharge, sore throat and trouble swallowing.    Respiratory: Negative for cough, chest tightness and shortness of breath.    Cardiovascular: Negative for chest pain and leg swelling.   Gastrointestinal: Negative for abdominal pain, nausea and vomiting.   Musculoskeletal: Negative for gait problem and myalgias.   Neurological: Negative for dizziness, weakness, headaches, disturbances in coordination and coordination difficulties.         Objective:      Physical Exam  Vitals reviewed.   Constitutional:       General: She is awake. She is not in acute distress.     Appearance: Normal appearance. She is well-developed, well-groomed and normal weight.   HENT:      Head: Normocephalic.      Right Ear: Tympanic membrane, ear canal and external ear normal. Tympanic membrane is not erythematous or bulging.      Left Ear: Ear canal and external ear normal. Tenderness present. Tympanic membrane is erythematous and bulging.      Nose: Rhinorrhea present.      Right Turbinates: Enlarged.      Left Turbinates: Enlarged.      Mouth/Throat:      Mouth: Mucous membranes are moist.      Pharynx: Oropharynx is clear. No posterior oropharyngeal erythema.      Comments: Postnasal drip visualized  Eyes:      General:         Right eye: No discharge.         Left eye: No discharge.      Extraocular Movements: Extraocular movements intact.   Neck:      Vascular: No carotid bruit.   Cardiovascular:      Rate and Rhythm: Normal rate and regular rhythm.      Heart sounds: Murmur heard.   Pulmonary:      Effort: Pulmonary effort is normal. No respiratory distress.      Breath sounds: No wheezing or rhonchi.   Abdominal:      General: There is no distension.   Musculoskeletal:      Right lower leg: No edema.      Left lower leg: No edema.   Lymphadenopathy:      Cervical: Cervical adenopathy (mild adenopathy to left side) present.   Skin:     General: Skin is warm and dry.      Coloration: Skin is not pale.   Neurological:       Mental Status: She is alert and oriented to person, place, and time.      Coordination: Coordination normal.      Gait: Gait normal.   Psychiatric:         Attention and Perception: Attention normal.         Mood and Affect: Mood and affect normal.         Speech: Speech normal.         Behavior: Behavior normal. Behavior is cooperative.         Thought Content: Thought content normal.         Assessment:       Problem List Items Addressed This Visit    None     Visit Diagnoses     Acute left otitis media    -  Primary    Relevant Medications    amoxicillin-clavulanate 875-125mg (AUGMENTIN) 875-125 mg per tablet          Plan:       Trudy was seen today for otalgia.    Diagnoses and all orders for this visit:    Acute left otitis media  -     amoxicillin-clavulanate 875-125mg (AUGMENTIN) 875-125 mg per tablet; Take 1 tablet by mouth every 12 (twelve) hours. for 7 days        - Begin taking Augmentin BID x 7 days  - May also take probiotic twice daily while on abx treatment  - Drink plenty fluids and eat as tolerated  - May take acetaminophen q6h PRN pain  - Warning signs discussed  - RTC as needed if symptoms worsen or fail to improve        I spent a total of 30 minutes on the day of the visit.  This includes face to face time and non-face to face time preparing to see the patient (eg, review of tests), obtaining and/or reviewing separately obtained history, documenting clinical information in the electronic or other health record, independently interpreting results and communicating results to the patient/family/caregiver, or care coordinator.

## 2022-03-25 NOTE — PATIENT INSTRUCTIONS
- Please check your blood pressure when you get home and settled, and then call our office to let us know the reading. Our number is [phone]

## 2022-05-17 DIAGNOSIS — I10 ESSENTIAL HYPERTENSION, BENIGN: ICD-10-CM

## 2022-05-17 NOTE — TELEPHONE ENCOUNTER
No new care gaps identified.  [facility] Embedded Care Gaps. Reference number: [id number]. 5/17/2022   8:21:01 AM [place]

## 2022-05-17 NOTE — TELEPHONE ENCOUNTER
Refill Routing Note   Medication(s) are not appropriate for processing by [facility] for the following reason(s):      - Required laboratory values are outdated  - Required vitals are abnormal    [facility] action(s):  Defer          Medication reconciliation completed: No     Appointments  past 12m or future 3m with PCP    Date Provider   Last Visit   3/26/2021 Maria C Guzman MD   Next Visit   Visit date not found Maria C Guzman MD   ED visits in past 90 days: 0        Note composed:6:18 PM 05/17/2022

## 2022-05-18 RX ORDER — IRBESARTAN AND HYDROCHLOROTHIAZIDE 300; 12.5 MG/1; MG/1
TABLET, FILM COATED ORAL
Qty: 90 TABLET | Refills: 0 | Status: SHIPPED | OUTPATIENT
Start: 2022-05-18 | End: 2022-05-23 | Stop reason: SDUPTHER

## 2022-05-20 ENCOUNTER — TELEPHONE (OUTPATIENT)
Dept: INTERNAL MEDICINE | Facility: CLINIC | Age: 82
End: 2022-05-20
Payer: OTHER GOVERNMENT

## 2022-05-20 NOTE — TELEPHONE ENCOUNTER
----- Message from [employer] Enoch sent at 5/20/2022  1:42 PM CDT -----  Contact: patient  [phone]  Type:  Sooner Apoointment Request    Caller is requesting a sooner appointment.  Caller declined first available appointment listed below.  Caller will not accept being placed on the waitlist and is requesting a message be sent to doctor.    Name of Caller:patient   When is the first available appointment? 7-5-2022  Symptoms: medication refill annual   Would the patient rather a call back or a response via [facility]?  Callback   Best Call Back Number: [phone]  Additional Information: patient would like to be seen 6-2-2022 in the morning

## 2022-05-23 ENCOUNTER — TELEPHONE (OUTPATIENT)
Dept: INTERNAL MEDICINE | Facility: CLINIC | Age: 82
End: 2022-05-23
Payer: OTHER GOVERNMENT

## 2022-05-23 DIAGNOSIS — E78.00 PURE HYPERCHOLESTEROLEMIA: ICD-10-CM

## 2022-05-23 DIAGNOSIS — I10 ESSENTIAL HYPERTENSION, BENIGN: ICD-10-CM

## 2022-05-23 DIAGNOSIS — E87.6 HYPOKALEMIA: ICD-10-CM

## 2022-05-23 RX ORDER — POTASSIUM CHLORIDE 20 MEQ/1
20 TABLET, EXTENDED RELEASE ORAL 2 TIMES DAILY
Qty: 180 TABLET | Refills: 0 | Status: SHIPPED | OUTPATIENT
Start: 2022-05-23 | End: 2022-08-22

## 2022-05-23 RX ORDER — AMLODIPINE BESYLATE 10 MG/1
10 TABLET ORAL DAILY
Qty: 90 TABLET | Refills: 0 | Status: SHIPPED | OUTPATIENT
Start: 2022-05-23 | End: 2022-08-22

## 2022-05-23 RX ORDER — CARVEDILOL 3.12 MG/1
3.12 TABLET ORAL 2 TIMES DAILY
Qty: 180 TABLET | Refills: 0 | Status: SHIPPED | OUTPATIENT
Start: 2022-05-23 | End: 2022-08-22

## 2022-05-23 RX ORDER — IRBESARTAN AND HYDROCHLOROTHIAZIDE 300; 12.5 MG/1; MG/1
1 TABLET, FILM COATED ORAL DAILY
Qty: 90 TABLET | Refills: 0 | Status: SHIPPED | OUTPATIENT
Start: 2022-05-23 | End: 2022-08-17

## 2022-05-23 RX ORDER — ROSUVASTATIN CALCIUM 20 MG/1
20 TABLET, COATED ORAL DAILY
Qty: 90 TABLET | Refills: 0 | Status: SHIPPED | OUTPATIENT
Start: 2022-05-23 | End: 2022-08-22

## 2022-05-23 NOTE — TELEPHONE ENCOUNTER
----- Message from Marcela Gallegos sent at 5/23/2022  8:23 AM CDT -----  Regarding: call back  Contact: [phone]  Type:  Sooner Apoointment Request    Caller is requesting a sooner appointment.  Caller declined first available appointment listed below.  Caller will not accept being placed on the waitlist and is requesting a message be sent to doctor.  Name of Caller: PT   When is the first available appointment? July   Symptoms: Annual Meds refills   Would the patient rather a call back or a response via [facility]? Call back   Best Call Back Number: [phone]  Additional Information:

## 2022-05-23 NOTE — TELEPHONE ENCOUNTER
----- Message from Marcela Gallegos sent at 5/23/2022  8:23 AM CDT -----  Regarding: call back  Contact: [phone]  Type:  Sooner Apoointment Request    Caller is requesting a sooner appointment.  Caller declined first available appointment listed below.  Caller will not accept being placed on the waitlist and is requesting a message be sent to doctor.  Name of Caller: PT   When is the first available appointment? July   Symptoms: Annual Meds refills   Would the patient rather a call back or a response via [facility]? Call back   Best Call Back Number: [phone]  Additional Information:

## 2022-07-14 ENCOUNTER — PES CALL (OUTPATIENT)
Dept: ADMINISTRATIVE | Facility: CLINIC | Age: 82
End: 2022-07-14
Payer: OTHER GOVERNMENT

## 2022-10-05 ENCOUNTER — TELEPHONE (OUTPATIENT)
Dept: FAMILY MEDICINE | Facility: CLINIC | Age: 82
End: 2022-10-05
Payer: OTHER GOVERNMENT

## 2022-10-05 DIAGNOSIS — Z12.31 ENCOUNTER FOR SCREENING MAMMOGRAM FOR MALIGNANT NEOPLASM OF BREAST: Primary | ICD-10-CM

## 2022-10-05 NOTE — TELEPHONE ENCOUNTER
----- Message from Destinee Cho sent at 10/5/2022  1:33 PM CDT -----  Regarding: Mammogram  Pt is calling to request a referral for a Mammogram Contact number is [phone]

## 2022-10-06 ENCOUNTER — TELEPHONE (OUTPATIENT)
Dept: ADMINISTRATIVE | Facility: OTHER | Age: 82
End: 2022-10-06
Payer: OTHER GOVERNMENT

## 2022-11-14 ENCOUNTER — HOSPITAL ENCOUNTER (OUTPATIENT)
Dept: RADIOLOGY | Facility: HOSPITAL | Age: 82
Discharge: HOME | End: 2022-11-14
Attending: NURSE PRACTITIONER
Payer: MEDICARE

## 2022-11-14 DIAGNOSIS — Z12.31 ENCOUNTER FOR SCREENING MAMMOGRAM FOR MALIGNANT NEOPLASM OF BREAST: ICD-10-CM

## 2022-11-14 PROCEDURE — 77063 MAMMO DIGITAL SCREENING BILAT WITH TOMO: ICD-10-PCS | Mod: 26,,, | Performed by: RADIOLOGY

## 2022-11-14 PROCEDURE — 77067 MAMMO DIGITAL SCREENING BILAT WITH TOMO: ICD-10-PCS | Mod: 26,,, | Performed by: RADIOLOGY

## 2022-11-14 PROCEDURE — 77063 BREAST TOMOSYNTHESIS BI: CPT | Mod: 26,,, | Performed by: RADIOLOGY

## 2022-11-14 PROCEDURE — 77067 SCR MAMMO BI INCL CAD: CPT | Mod: TC

## 2022-11-14 PROCEDURE — 77067 SCR MAMMO BI INCL CAD: CPT | Mod: 26,,, | Performed by: RADIOLOGY

## 2022-11-25 ENCOUNTER — TELEPHONE (OUTPATIENT)
Dept: FAMILY MEDICINE | Facility: CLINIC | Age: 82
End: 2022-11-25
Payer: MEDICARE

## 2023-02-14 ENCOUNTER — TELEPHONE (OUTPATIENT)
Dept: FAMILY MEDICINE | Facility: CLINIC | Age: 83
End: 2023-02-14
Payer: MEDICARE

## 2023-02-14 DIAGNOSIS — I10 ESSENTIAL HYPERTENSION, BENIGN: ICD-10-CM

## 2023-02-14 RX ORDER — IRBESARTAN AND HYDROCHLOROTHIAZIDE 300; 12.5 MG/1; MG/1
TABLET, FILM COATED ORAL
Qty: 90 TABLET | Refills: 3 | OUTPATIENT
Start: 2023-02-14

## 2023-02-14 NOTE — TELEPHONE ENCOUNTER
Care Due:                  Date            Visit Type   Department     Provider  --------------------------------------------------------------------------------                                EP -                              PRIMARY      [facility]  Last Visit: 03-      CARE ([facility])   [facility]       Maria C Guzman  Next Visit: None Scheduled  None         None Found                                                            Last  Test          Frequency    Reason                     Performed    Due Date  --------------------------------------------------------------------------------    Office Visit  12 months..  rosuvastatin.............  03- 03-    CMP.........  12 months..  rosuvastatin.............  03- 03-    Lipid Panel.  12 months..  rosuvastatin.............  03- 03-    [facility] Embedded Care Gaps. Reference number: [id number]. 2/14/2023   9:40:35 AM CST

## 2023-02-14 NOTE — TELEPHONE ENCOUNTER
Left message for pt to schedule a follow-up appointment. Pt last seen March 2022.  Pt advised to schedule an appt so that refills can be authorized.

## 2023-03-06 DIAGNOSIS — I10 ESSENTIAL HYPERTENSION, BENIGN: ICD-10-CM

## 2023-03-06 NOTE — TELEPHONE ENCOUNTER
Refill Routing Note   Medication(s) are not appropriate for processing by [facility] for the following reason(s):       Required vitals abnormal:  BP (!) 152/70    [facility] action(s):  Defer         Appointments  past 12m or future 3m with PCP    Date Provider   Last Visit   3/26/2021 Maria C Guzman MD   Next Visit   4/25/2023 Maria C Guzman MD   ED visits in past 90 days: 0        Note composed:4:52 PM 03/06/2023

## 2023-03-06 NOTE — TELEPHONE ENCOUNTER
No new care gaps identified.  [facility] Embedded Care Gaps. Reference number: [id number]. 3/06/2023   9:37:39 AM CST

## 2023-03-07 RX ORDER — IRBESARTAN AND HYDROCHLOROTHIAZIDE 300; 12.5 MG/1; MG/1
1 TABLET, FILM COATED ORAL DAILY
Qty: 90 TABLET | Refills: 0 | Status: SHIPPED | OUTPATIENT
Start: 2023-03-07 | End: 2023-04-25 | Stop reason: SDUPTHER

## 2023-03-07 RX ORDER — CARVEDILOL 3.12 MG/1
3.12 TABLET ORAL 2 TIMES DAILY
Qty: 180 TABLET | Refills: 0 | Status: SHIPPED | OUTPATIENT
Start: 2023-03-07 | End: 2023-04-25 | Stop reason: SDUPTHER

## 2023-04-25 ENCOUNTER — OFFICE VISIT (OUTPATIENT)
Dept: FAMILY MEDICINE | Facility: CLINIC | Age: 83
End: 2023-04-25
Payer: MEDICARE

## 2023-04-25 ENCOUNTER — LAB VISIT (OUTPATIENT)
Dept: LAB | Facility: HOSPITAL | Age: 83
End: 2023-04-25
Attending: FAMILY MEDICINE
Payer: MEDICARE

## 2023-04-25 VITALS
OXYGEN SATURATION: 94 % | HEART RATE: 90 BPM | BODY MASS INDEX: 20.74 KG/M2 | WEIGHT: 105.63 LBS | HEIGHT: 60 IN | SYSTOLIC BLOOD PRESSURE: 132 MMHG | DIASTOLIC BLOOD PRESSURE: 69 MMHG

## 2023-04-25 DIAGNOSIS — I11.9 HYPERTENSIVE LEFT VENTRICULAR HYPERTROPHY, WITHOUT HEART FAILURE: ICD-10-CM

## 2023-04-25 DIAGNOSIS — I10 ESSENTIAL HYPERTENSION, BENIGN: Primary | ICD-10-CM

## 2023-04-25 DIAGNOSIS — E78.00 PURE HYPERCHOLESTEROLEMIA: ICD-10-CM

## 2023-04-25 DIAGNOSIS — I51.89 DIASTOLIC DYSFUNCTION: ICD-10-CM

## 2023-04-25 DIAGNOSIS — E87.6 HYPOKALEMIA: ICD-10-CM

## 2023-04-25 DIAGNOSIS — I10 ESSENTIAL HYPERTENSION, BENIGN: ICD-10-CM

## 2023-04-25 DIAGNOSIS — H35.3231 EXUDATIVE AGE-RELATED MACULAR DEGENERATION OF BOTH EYES WITH ACTIVE CHOROIDAL NEOVASCULARIZATION: ICD-10-CM

## 2023-04-25 DIAGNOSIS — M81.0 OSTEOPOROSIS, POST-MENOPAUSAL: ICD-10-CM

## 2023-04-25 LAB
ALBUMIN SERPL BCP-MCNC: 3.9 G/DL (ref 3.5–5.2)
ALP SERPL-CCNC: 71 U/L (ref 55–135)
ALT SERPL W/O P-5'-P-CCNC: 13 U/L (ref 10–44)
ANION GAP SERPL CALC-SCNC: 8 MMOL/L (ref 8–16)
AST SERPL-CCNC: 18 U/L (ref 10–40)
BASOPHILS # BLD AUTO: 0.04 K/UL (ref 0–0.2)
BASOPHILS NFR BLD: 0.5 % (ref 0–1.9)
BILIRUB SERPL-MCNC: 0.5 MG/DL (ref 0.1–1)
BUN SERPL-MCNC: 29 MG/DL (ref 8–23)
CALCIUM SERPL-MCNC: 10.1 MG/DL (ref 8.7–10.5)
CHLORIDE SERPL-SCNC: 109 MMOL/L (ref 95–110)
CHOLEST SERPL-MCNC: 193 MG/DL (ref 120–199)
CHOLEST/HDLC SERPL: 2.9 {RATIO} (ref 2–5)
CO2 SERPL-SCNC: 26 MMOL/L (ref 23–29)
CREAT SERPL-MCNC: 1.2 MG/DL (ref 0.5–1.4)
DIFFERENTIAL METHOD: ABNORMAL
EOSINOPHIL # BLD AUTO: 0.1 K/UL (ref 0–0.5)
EOSINOPHIL NFR BLD: 0.9 % (ref 0–8)
ERYTHROCYTE [DISTWIDTH] IN BLOOD BY AUTOMATED COUNT: 13.2 % (ref 11.5–14.5)
EST. GFR  (NO RACE VARIABLE): 44.6 ML/MIN/1.73 M^2
GLUCOSE SERPL-MCNC: 102 MG/DL (ref 70–110)
HCT VFR BLD AUTO: 37.4 % (ref 37–48.5)
HDLC SERPL-MCNC: 66 MG/DL (ref 40–75)
HDLC SERPL: 34.2 % (ref 20–50)
HGB BLD-MCNC: 12.2 G/DL (ref 12–16)
IMM GRANULOCYTES # BLD AUTO: 0.02 K/UL (ref 0–0.04)
IMM GRANULOCYTES NFR BLD AUTO: 0.3 % (ref 0–0.5)
LDLC SERPL CALC-MCNC: 106.8 MG/DL (ref 63–159)
LYMPHOCYTES # BLD AUTO: 1.4 K/UL (ref 1–4.8)
LYMPHOCYTES NFR BLD: 17.7 % (ref 18–48)
MCH RBC QN AUTO: 29.1 PG (ref 27–31)
MCHC RBC AUTO-ENTMCNC: 32.6 G/DL (ref 32–36)
MCV RBC AUTO: 89 FL (ref 82–98)
MONOCYTES # BLD AUTO: 0.5 K/UL (ref 0.3–1)
MONOCYTES NFR BLD: 7.1 % (ref 4–15)
NEUTROPHILS # BLD AUTO: 5.6 K/UL (ref 1.8–7.7)
NEUTROPHILS NFR BLD: 73.5 % (ref 38–73)
NONHDLC SERPL-MCNC: 127 MG/DL
NRBC BLD-RTO: 0 /100 WBC
PLATELET # BLD AUTO: 282 K/UL (ref 150–450)
PMV BLD AUTO: 10.3 FL (ref 9.2–12.9)
POTASSIUM SERPL-SCNC: 4.5 MMOL/L (ref 3.5–5.1)
PROT SERPL-MCNC: 7.9 G/DL (ref 6–8.4)
RBC # BLD AUTO: 4.19 M/UL (ref 4–5.4)
SODIUM SERPL-SCNC: 143 MMOL/L (ref 136–145)
TRIGL SERPL-MCNC: 101 MG/DL (ref 30–150)
WBC # BLD AUTO: 7.64 K/UL (ref 3.9–12.7)

## 2023-04-25 PROCEDURE — 99214 OFFICE O/P EST MOD 30 MIN: CPT | Mod: PBBFAC,PO | Performed by: FAMILY MEDICINE

## 2023-04-25 PROCEDURE — 99999 PR PBB SHADOW E&M-EST. PATIENT-LVL IV: ICD-10-PCS | Mod: PBBFAC,,, | Performed by: FAMILY MEDICINE

## 2023-04-25 PROCEDURE — 36415 COLL VENOUS BLD VENIPUNCTURE: CPT | Mod: PO | Performed by: FAMILY MEDICINE

## 2023-04-25 PROCEDURE — 99999 PR PBB SHADOW E&M-EST. PATIENT-LVL IV: CPT | Mod: PBBFAC,,, | Performed by: FAMILY MEDICINE

## 2023-04-25 PROCEDURE — 80053 COMPREHEN METABOLIC PANEL: CPT | Performed by: FAMILY MEDICINE

## 2023-04-25 PROCEDURE — 99214 OFFICE O/P EST MOD 30 MIN: CPT | Mod: S$PBB,,, | Performed by: FAMILY MEDICINE

## 2023-04-25 PROCEDURE — 99214 PR OFFICE/OUTPT VISIT, EST, LEVL IV, 30-39 MIN: ICD-10-PCS | Mod: S$PBB,,, | Performed by: FAMILY MEDICINE

## 2023-04-25 PROCEDURE — 85025 COMPLETE CBC W/AUTO DIFF WBC: CPT | Performed by: FAMILY MEDICINE

## 2023-04-25 PROCEDURE — 80061 LIPID PANEL: CPT | Performed by: FAMILY MEDICINE

## 2023-04-25 RX ORDER — CARVEDILOL 3.12 MG/1
3.12 TABLET ORAL 2 TIMES DAILY
Qty: 180 TABLET | Refills: 3 | Status: SHIPPED | OUTPATIENT
Start: 2023-04-25 | End: 2024-02-06 | Stop reason: SDUPTHER

## 2023-04-25 RX ORDER — AMLODIPINE BESYLATE 10 MG/1
10 TABLET ORAL DAILY
Qty: 90 TABLET | Refills: 3 | Status: SHIPPED | OUTPATIENT
Start: 2023-04-25 | End: 2024-02-06 | Stop reason: SDUPTHER

## 2023-04-25 RX ORDER — POTASSIUM CHLORIDE 20 MEQ/1
20 TABLET, EXTENDED RELEASE ORAL 2 TIMES DAILY
Qty: 180 TABLET | Refills: 3 | Status: SHIPPED | OUTPATIENT
Start: 2023-04-25 | End: 2024-02-06 | Stop reason: SDUPTHER

## 2023-04-25 RX ORDER — ROSUVASTATIN CALCIUM 20 MG/1
20 TABLET, COATED ORAL DAILY
Qty: 90 TABLET | Refills: 3 | Status: SHIPPED | OUTPATIENT
Start: 2023-04-25 | End: 2024-02-06 | Stop reason: SDUPTHER

## 2023-04-25 RX ORDER — NAPROXEN SODIUM 220 MG/1
81 TABLET, FILM COATED ORAL DAILY
Qty: 90 TABLET | Refills: 3 | Status: SHIPPED | OUTPATIENT
Start: 2023-04-25 | End: 2024-02-06 | Stop reason: SDUPTHER

## 2023-04-25 RX ORDER — IRBESARTAN AND HYDROCHLOROTHIAZIDE 300; 12.5 MG/1; MG/1
1 TABLET, FILM COATED ORAL DAILY
Qty: 90 TABLET | Refills: 3 | Status: SHIPPED | OUTPATIENT
Start: 2023-04-25 | End: 2023-07-26

## 2023-04-25 NOTE — PROGRESS NOTES
Subjective:       Patient ID: Trudy Horvath is a 82 y.o. female.    Chief Complaint: Hypertension and Hyperlipidemia  81 yo female with HTN, hyperlipidemia, LVH, diastolic dysfunction and macular degeneration presents for f/u of her her chronic conditions. Is followed by Dr. Wilmer Hand for macular degeneration. Is seen every 6 months.  Has osteoporosis. Uses Prolia  Hypertension  This is a chronic problem. The current episode started more than 1 year ago. The problem is unchanged. The problem is controlled. Pertinent negatives include no chest pain, palpitations or shortness of breath.   Hyperlipidemia  Pertinent negatives include no chest pain or shortness of breath.   Review of Systems   Constitutional:  Negative for appetite change, chills and fever.   Respiratory:  Negative for shortness of breath.    Cardiovascular:  Negative for chest pain, palpitations and leg swelling.   Gastrointestinal:  Negative for abdominal pain, anal bleeding, blood in stool, nausea and vomiting.   Genitourinary:  Negative for dysuria and hematuria.     Objective:      Physical Exam  Vitals reviewed.   Constitutional:       General: She is not in acute distress.     Appearance: Normal appearance. She is not ill-appearing.   HENT:      Head: Normocephalic and atraumatic.      Right Ear: External ear normal.      Left Ear: External ear normal.   Eyes:      General:         Right eye: No discharge.         Left eye: No discharge.      Extraocular Movements: Extraocular movements intact.      Conjunctiva/sclera: Conjunctivae normal.   Neck:      Vascular: Carotid bruit present.   Cardiovascular:      Rate and Rhythm: Normal rate and regular rhythm.      Pulses: Normal pulses.      Heart sounds: Murmur heard.   Pulmonary:      Effort: Pulmonary effort is normal.      Breath sounds: Normal breath sounds. No wheezing or rales.   Abdominal:      General: Bowel sounds are normal.      Palpations: Abdomen is soft. There is no mass.       Tenderness: There is no abdominal tenderness.   Musculoskeletal:      Cervical back: Normal range of motion and neck supple. No rigidity or tenderness.      Right lower leg: No edema.      Left lower leg: No edema.   Skin:     General: Skin is warm and dry.   Neurological:      Mental Status: She is alert.   Psychiatric:         Mood and Affect: Mood normal.       Assessment:         See plan  Plan:       Essential hypertension, benign: Stable  -     amLODIPine (NORVASC) 10 MG tablet; Take 1 tablet (10 mg total) by mouth once daily.  Dispense: 90 tablet; Refill: 3  -     carvediloL (COREG) 3.125 MG tablet; Take 1 tablet (3.125 mg total) by mouth 2 (two) times daily.  Dispense: 180 tablet; Refill: 3  -     irbesartan-hydrochlorothiazide (AVALIDE) 300-12.5 mg per tablet; Take 1 tablet by mouth once daily.  Dispense: 90 tablet; Refill: 3  -     CBC Auto Differential; Future; Expected date: 04/25/2023    Pure hypercholesterolemia: Stable  -     rosuvastatin (CRESTOR) 20 MG tablet; Take 1 tablet (20 mg total) by mouth once daily.  Dispense: 90 tablet; Refill: 3  -     Comprehensive Metabolic Panel; Future; Expected date: 04/25/2023  -     Lipid Panel; Future; Expected date: 04/25/2023    Hypertensive left ventricular hypertrophy, without heart failure: Stable  -     aspirin 81 MG Chew; Take 1 tablet (81 mg total) by mouth once daily.  Dispense: 90 tablet; Refill: 3    Diastolic dysfunction: Stable  -     aspirin 81 MG Chew; Take 1 tablet (81 mg total) by mouth once daily.  Dispense: 90 tablet; Refill: 3    BMI 20.0-20.9, adult: Stable    Osteoporosis, post-menopausal: Stable    Exudative age-related macular degeneration of both eyes with active choroidal neovascularization: Stable    Hypokalemia: Stable  -     potassium chloride SA (K-DUR,KLOR-CON) 20 MEQ tablet; Take 1 tablet (20 mEq total) by mouth 2 (two) times daily.  Dispense: 180 tablet; Refill: 3       F/U in 1 year

## 2023-05-02 ENCOUNTER — TELEPHONE (OUTPATIENT)
Dept: PHARMACY | Facility: CLINIC | Age: 83
End: 2023-05-02
Payer: MEDICARE

## 2023-05-02 ENCOUNTER — SPECIALTY PHARMACY (OUTPATIENT)
Dept: PHARMACY | Facility: CLINIC | Age: 83
End: 2023-05-02
Payer: MEDICARE

## 2023-05-02 NOTE — TELEPHONE ENCOUNTER
Hello, this is Henrietta Wood, clinical pharmacist with [facility] that is part of your care team.  We have begun working on your prescription that your doctor has sent us. Our next steps include:     Working with your insurance company to obtain approval for your medication  Working with you to ensure your medication is affordable     We will be calling you along the way with updates on your medication but if you have any concerns or receive information that you would like to discuss please reach us at [phone].    Welcome call outcome: Left voicemail

## 2023-07-26 DIAGNOSIS — I10 ESSENTIAL HYPERTENSION, BENIGN: ICD-10-CM

## 2023-07-26 RX ORDER — IRBESARTAN AND HYDROCHLOROTHIAZIDE 300; 12.5 MG/1; MG/1
TABLET, FILM COATED ORAL
Qty: 90 TABLET | Refills: 2 | Status: SHIPPED | OUTPATIENT
Start: 2023-07-26

## 2023-07-26 NOTE — TELEPHONE ENCOUNTER
No care due was identified.  [facility] Embedded Care Due Messages. Reference number: [id number].   7/26/2023 9:28:23 AM CDT

## 2023-07-26 NOTE — TELEPHONE ENCOUNTER
Refill Decision Note   Trudy Horvath  is requesting a refill authorization.  Brief Assessment and Rationale for Refill:  Approve     Medication Therapy Plan:  K+ WNL (4.5)      Pharmacist review requested: Yes   Comments:     Note composed:3:30 PM 07/26/2023

## 2023-07-26 NOTE — TELEPHONE ENCOUNTER
Refill Routing Note   Medication(s) are not appropriate for processing by [facility] for the following reason(s):      Drug-disease interaction    [facility] action(s):  Defer Care Due:  None identified     Medication Therapy Plan: Hypokalemia    Pharmacist review requested: Yes     Appointments  past 12m or future 3m with PCP    Date Provider   Last Visit   4/25/2023 Maria C Guzman MD   Next Visit   Visit date not found Maria C Guzman MD   ED visits in past 90 days: 0        Note composed:9:40 AM 07/26/2023

## 2024-01-26 ENCOUNTER — TELEPHONE (OUTPATIENT)
Dept: FAMILY MEDICINE | Facility: CLINIC | Age: 84
End: 2024-01-26
Payer: MEDICARE

## 2024-01-26 NOTE — TELEPHONE ENCOUNTER
I tried to call the daughter back as she requested. There was NA/LM. I tried to call all the numbers in the chart including the # the daughter left to call back. It went straight to , a message was left.                                                       Aileen Liu Staff  Caller: Henrietta (Today,  2:29 PM)  Type:  Needs Medical Advice    Who Called: pt  Symptoms (please be specific): pt needs the list of medication she should be taking believes she is taking more then she is suppose to in the last couple months she is not feeling well having problems with her memory and bad shakes please call right back daughter is waiting very concerned    Would the patient rather a call back or a response via [facility]? call  Best Call Back Number: [phone]  Additional Information:

## 2024-02-01 ENCOUNTER — TELEPHONE (OUTPATIENT)
Dept: FAMILY MEDICINE | Facility: CLINIC | Age: 84
End: 2024-02-01
Payer: MEDICARE

## 2024-02-01 NOTE — TELEPHONE ENCOUNTER
Pt has not been seen since April 2023. Please offer an appointment on 2/6/24 to review health conditions and all medications.

## 2024-02-01 NOTE — TELEPHONE ENCOUNTER
----- Message from Julianna Pollard sent at 2/1/2024 12:24 PM CST -----  Type:  Patient Returning Call    Who Called:Henrietta/ daughter   Would the patient rather a call back or a response via [facility]? Call back   Best Call Back Number:[phone] daughter number   Additional Information: trying to make sure her mother is taking the right medication

## 2024-02-06 ENCOUNTER — LAB VISIT (OUTPATIENT)
Dept: LAB | Facility: HOSPITAL | Age: 84
End: 2024-02-06
Attending: FAMILY MEDICINE
Payer: MEDICARE

## 2024-02-06 ENCOUNTER — OFFICE VISIT (OUTPATIENT)
Dept: FAMILY MEDICINE | Facility: CLINIC | Age: 84
End: 2024-02-06
Payer: MEDICARE

## 2024-02-06 VITALS
BODY MASS INDEX: 18.62 KG/M2 | SYSTOLIC BLOOD PRESSURE: 162 MMHG | HEART RATE: 74 BPM | HEIGHT: 60 IN | DIASTOLIC BLOOD PRESSURE: 80 MMHG | WEIGHT: 94.81 LBS

## 2024-02-06 DIAGNOSIS — E78.49 OTHER HYPERLIPIDEMIA: ICD-10-CM

## 2024-02-06 DIAGNOSIS — R41.3 MEMORY LOSS: ICD-10-CM

## 2024-02-06 DIAGNOSIS — R73.09 ABNORMAL GLUCOSE: ICD-10-CM

## 2024-02-06 DIAGNOSIS — R41.3 OTHER AMNESIA: ICD-10-CM

## 2024-02-06 DIAGNOSIS — I11.9 HYPERTENSIVE LEFT VENTRICULAR HYPERTROPHY, WITHOUT HEART FAILURE: ICD-10-CM

## 2024-02-06 DIAGNOSIS — E78.00 PURE HYPERCHOLESTEROLEMIA: ICD-10-CM

## 2024-02-06 DIAGNOSIS — N18.31 STAGE 3A CHRONIC KIDNEY DISEASE: ICD-10-CM

## 2024-02-06 DIAGNOSIS — H35.3231 EXUDATIVE AGE-RELATED MACULAR DEGENERATION OF BOTH EYES WITH ACTIVE CHOROIDAL NEOVASCULARIZATION: ICD-10-CM

## 2024-02-06 DIAGNOSIS — I10 ESSENTIAL HYPERTENSION, BENIGN: Primary | ICD-10-CM

## 2024-02-06 DIAGNOSIS — I10 ESSENTIAL HYPERTENSION, BENIGN: ICD-10-CM

## 2024-02-06 DIAGNOSIS — I51.89 DIASTOLIC DYSFUNCTION: ICD-10-CM

## 2024-02-06 DIAGNOSIS — M81.0 OSTEOPOROSIS, POST-MENOPAUSAL: ICD-10-CM

## 2024-02-06 DIAGNOSIS — R25.1 TREMORS OF NERVOUS SYSTEM: ICD-10-CM

## 2024-02-06 DIAGNOSIS — E87.6 HYPOKALEMIA: ICD-10-CM

## 2024-02-06 LAB
ALBUMIN SERPL BCP-MCNC: 3.7 G/DL (ref 3.5–5.2)
ALP SERPL-CCNC: 59 U/L (ref 55–135)
ALT SERPL W/O P-5'-P-CCNC: 10 U/L (ref 10–44)
ANION GAP SERPL CALC-SCNC: 10 MMOL/L (ref 8–16)
AST SERPL-CCNC: 18 U/L (ref 10–40)
BASOPHILS # BLD AUTO: 0.03 K/UL (ref 0–0.2)
BASOPHILS NFR BLD: 0.3 % (ref 0–1.9)
BILIRUB SERPL-MCNC: 0.5 MG/DL (ref 0.1–1)
BUN SERPL-MCNC: 22 MG/DL (ref 8–23)
CALCIUM SERPL-MCNC: 10 MG/DL (ref 8.7–10.5)
CHLORIDE SERPL-SCNC: 107 MMOL/L (ref 95–110)
CHOLEST SERPL-MCNC: 224 MG/DL (ref 120–199)
CHOLEST/HDLC SERPL: 3.7 {RATIO} (ref 2–5)
CO2 SERPL-SCNC: 24 MMOL/L (ref 23–29)
CREAT SERPL-MCNC: 1 MG/DL (ref 0.5–1.4)
DIFFERENTIAL METHOD BLD: ABNORMAL
EOSINOPHIL # BLD AUTO: 0.1 K/UL (ref 0–0.5)
EOSINOPHIL NFR BLD: 0.8 % (ref 0–8)
ERYTHROCYTE [DISTWIDTH] IN BLOOD BY AUTOMATED COUNT: 13.2 % (ref 11.5–14.5)
EST. GFR  (NO RACE VARIABLE): 55.2 ML/MIN/1.73 M^2
ESTIMATED AVG GLUCOSE: 114 MG/DL (ref 68–131)
FOLATE SERPL-MCNC: 11.1 NG/ML (ref 4–24)
GLUCOSE SERPL-MCNC: 108 MG/DL (ref 70–110)
HBA1C MFR BLD: 5.6 % (ref 4–5.6)
HCT VFR BLD AUTO: 39.2 % (ref 37–48.5)
HDLC SERPL-MCNC: 60 MG/DL (ref 40–75)
HDLC SERPL: 26.8 % (ref 20–50)
HGB BLD-MCNC: 12.5 G/DL (ref 12–16)
HIV 1+2 AB+HIV1 P24 AG SERPL QL IA: NORMAL
IMM GRANULOCYTES # BLD AUTO: 0.03 K/UL (ref 0–0.04)
IMM GRANULOCYTES NFR BLD AUTO: 0.3 % (ref 0–0.5)
LDLC SERPL CALC-MCNC: 141.6 MG/DL (ref 63–159)
LYMPHOCYTES # BLD AUTO: 1.4 K/UL (ref 1–4.8)
LYMPHOCYTES NFR BLD: 16.6 % (ref 18–48)
MCH RBC QN AUTO: 29.3 PG (ref 27–31)
MCHC RBC AUTO-ENTMCNC: 31.9 G/DL (ref 32–36)
MCV RBC AUTO: 92 FL (ref 82–98)
MONOCYTES # BLD AUTO: 0.7 K/UL (ref 0.3–1)
MONOCYTES NFR BLD: 7.6 % (ref 4–15)
NEUTROPHILS # BLD AUTO: 6.4 K/UL (ref 1.8–7.7)
NEUTROPHILS NFR BLD: 74.4 % (ref 38–73)
NONHDLC SERPL-MCNC: 164 MG/DL
NRBC BLD-RTO: 0 /100 WBC
PLATELET # BLD AUTO: 338 K/UL (ref 150–450)
PMV BLD AUTO: 10.4 FL (ref 9.2–12.9)
POTASSIUM SERPL-SCNC: 3.9 MMOL/L (ref 3.5–5.1)
PROT SERPL-MCNC: 7.5 G/DL (ref 6–8.4)
RBC # BLD AUTO: 4.26 M/UL (ref 4–5.4)
SODIUM SERPL-SCNC: 141 MMOL/L (ref 136–145)
TRIGL SERPL-MCNC: 112 MG/DL (ref 30–150)
VIT B12 SERPL-MCNC: 181 PG/ML (ref 210–950)
WBC # BLD AUTO: 8.63 K/UL (ref 3.9–12.7)

## 2024-02-06 PROCEDURE — 86592 SYPHILIS TEST NON-TREP QUAL: CPT | Performed by: FAMILY MEDICINE

## 2024-02-06 PROCEDURE — 99215 OFFICE O/P EST HI 40 MIN: CPT | Mod: S$PBB,,, | Performed by: FAMILY MEDICINE

## 2024-02-06 PROCEDURE — 99213 OFFICE O/P EST LOW 20 MIN: CPT | Mod: PBBFAC,PO | Performed by: FAMILY MEDICINE

## 2024-02-06 PROCEDURE — 83036 HEMOGLOBIN GLYCOSYLATED A1C: CPT | Performed by: FAMILY MEDICINE

## 2024-02-06 PROCEDURE — 80061 LIPID PANEL: CPT | Performed by: FAMILY MEDICINE

## 2024-02-06 PROCEDURE — 36415 COLL VENOUS BLD VENIPUNCTURE: CPT | Mod: PO | Performed by: FAMILY MEDICINE

## 2024-02-06 PROCEDURE — 82746 ASSAY OF FOLIC ACID SERUM: CPT | Performed by: FAMILY MEDICINE

## 2024-02-06 PROCEDURE — 99999 PR PBB SHADOW E&M-EST. PATIENT-LVL III: CPT | Mod: PBBFAC,,, | Performed by: FAMILY MEDICINE

## 2024-02-06 PROCEDURE — 87389 HIV-1 AG W/HIV-1&-2 AB AG IA: CPT | Performed by: FAMILY MEDICINE

## 2024-02-06 PROCEDURE — 85025 COMPLETE CBC W/AUTO DIFF WBC: CPT | Performed by: FAMILY MEDICINE

## 2024-02-06 PROCEDURE — 80053 COMPREHEN METABOLIC PANEL: CPT | Performed by: FAMILY MEDICINE

## 2024-02-06 PROCEDURE — 82607 VITAMIN B-12: CPT | Performed by: FAMILY MEDICINE

## 2024-02-06 RX ORDER — CARVEDILOL 3.12 MG/1
3.12 TABLET ORAL 2 TIMES DAILY
Qty: 180 TABLET | Refills: 3 | Status: SHIPPED | OUTPATIENT
Start: 2024-02-06 | End: 2024-06-10 | Stop reason: SDUPTHER

## 2024-02-06 RX ORDER — AMLODIPINE BESYLATE 10 MG/1
10 TABLET ORAL DAILY
Qty: 90 TABLET | Refills: 3 | Status: SHIPPED | OUTPATIENT
Start: 2024-02-06 | End: 2024-06-10 | Stop reason: SDUPTHER

## 2024-02-06 RX ORDER — ROSUVASTATIN CALCIUM 20 MG/1
20 TABLET, COATED ORAL DAILY
Qty: 90 TABLET | Refills: 3 | Status: SHIPPED | OUTPATIENT
Start: 2024-02-06 | End: 2024-06-10 | Stop reason: SDUPTHER

## 2024-02-06 RX ORDER — NAPROXEN SODIUM 220 MG/1
81 TABLET, FILM COATED ORAL DAILY
Qty: 90 TABLET | Refills: 3 | Status: SHIPPED | OUTPATIENT
Start: 2024-02-06 | End: 2024-06-10 | Stop reason: SDUPTHER

## 2024-02-06 RX ORDER — POTASSIUM CHLORIDE 20 MEQ/1
20 TABLET, EXTENDED RELEASE ORAL 2 TIMES DAILY
Qty: 180 TABLET | Refills: 3 | Status: SHIPPED | OUTPATIENT
Start: 2024-02-06 | End: 2024-06-10 | Stop reason: SDUPTHER

## 2024-02-06 NOTE — PROGRESS NOTES
Subjective:       Patient ID: Trudy Horvath is a 83 y.o. female.    Chief Complaint: Follow-up, Hypertension, and Hyperlipidemia  84 yo female with HTN, hyperlipidemia, LVH, diastolic dysfunction and macular degeneration presents for f/u of her her chronic conditions. Pt is accompanied to the visit by her daughter, Henrietta. Henrietta is concerned that patient has memory loss. Has noted memory issues since October 2023 which have worsened since the beginning of the year. Pt repeats herself. Has near memory issues. Daughter notes that patient has intermittent anxious mood. Daughter also notes that patient has tremors of her hands. Tremors cause difficulty writing. Pt's father had Parkinson's disease. Other family members have noted patient's memory and cognitive changes.  Hypertension  This is a chronic problem. The current episode started more than 1 year ago. The problem is unchanged. The problem is uncontrolled. Pertinent negatives include no chest pain or shortness of breath.   Hyperlipidemia  Pertinent negatives include no chest pain or shortness of breath.    Hernietta is staying at her home.  Medications reviewed. Pt is not taking Amlodipine 10 mg qhs. Henrietta has started a pillbox. Pt has not been taking her medication. Pt has hypokalemia and has not been taking potassium supplements.  To see Neurology on 2/20/24 for memory issues.  Review of Systems   Constitutional:  Negative for chills and fever.   Respiratory:  Negative for shortness of breath.    Cardiovascular:  Negative for chest pain.   Neurological:  Positive for tremors.   Psychiatric/Behavioral:  Positive for confusion. The patient is nervous/anxious.        Objective:      Physical Exam  Vitals reviewed.   Constitutional:       General: She is not in acute distress.     Appearance: Normal appearance. She is not ill-appearing.   HENT:      Head: Normocephalic and atraumatic.      Right Ear: External ear normal.      Left Ear: External ear normal.   Eyes:      General:          Right eye: No discharge.         Left eye: No discharge.      Extraocular Movements: Extraocular movements intact.      Conjunctiva/sclera: Conjunctivae normal.   Neck:      Vascular: No carotid bruit.   Cardiovascular:      Rate and Rhythm: Normal rate and regular rhythm.      Pulses: Normal pulses.      Heart sounds: Murmur heard.      No gallop.   Pulmonary:      Effort: Pulmonary effort is normal.      Breath sounds: Normal breath sounds. No wheezing or rales.   Abdominal:      General: Bowel sounds are normal.      Palpations: Abdomen is soft. There is no mass.      Tenderness: There is no abdominal tenderness.   Musculoskeletal:      Cervical back: Normal range of motion and neck supple. No rigidity or tenderness.      Right lower leg: No edema.      Left lower leg: No edema.   Skin:     General: Skin is warm and dry.   Neurological:      Mental Status: She is alert.      Motor: Tremor present.      Comments: Tremors of head and hands         Assessment:       See plan  Plan:       Essential hypertension, benign: Uncontrolled.  - Reviewed all medications with patient and her daughter. Pt to restart Amlodipine 10 mg qd. Pt's daughter advised to use a pillbox and administer pt's medications daily.  -     CBC Auto Differential; Future; Expected date: 02/06/2024  -     amLODIPine (NORVASC) 10 MG tablet; Take 1 tablet (10 mg total) by mouth once daily.  Dispense: 90 tablet; Refill: 3  -     carvediloL (COREG) 3.125 MG tablet; Take 1 tablet (3.125 mg total) by mouth 2 (two) times daily.  Dispense: 180 tablet; Refill: 3    Other hyperlipidemia: Stable  -     Lipid Panel; Future; Expected date: 02/06/2024  -     Comprehensive Metabolic Panel; Future; Expected date: 02/06/2024  -     rosuvastatin (CRESTOR) 20 MG tablet; Take 1 tablet (20 mg total) by mouth once daily.  Dispense: 90 tablet; Refill: 3    Hypertensive left ventricular hypertrophy, without heart failure: Stable  -     aspirin 81 MG Chew; Take 1  tablet (81 mg total) by mouth once daily.  Dispense: 90 tablet; Refill: 3    Diastolic dysfunction: Stable  -     aspirin 81 MG Chew; Take 1 tablet (81 mg total) by mouth once daily.  Dispense: 90 tablet; Refill: 3    Hypokalemia  -    Restart potassium chloride SA (K-DUR,KLOR-CON) 20 MEQ tablet; Take 1 tablet (20 mEq total) by mouth 2 (two) times daily.  Dispense: 180 tablet; Refill: 3    Osteoporosis, post-menopausal: Stable    Body mass index (BMI) less than 19: Stable    Exudative age-related macular degeneration of both eyes with active choroidal neovascularization: Stable    Memory loss  - F/U with Neurology on 2/20/24  -     Vitamin B12; Future; Expected date: 02/06/2024  -     Folate; Future; Expected date: 02/06/2024  -     HIV 1/2 Ag/Ab (4th Gen); Future; Expected date: 02/06/2024  -     RPR; Future; Expected date: 02/06/2024    Stage 3a chronic kidney disease: Stable    Abnormal glucose  Pt advised to decrease intake of white bread, white rice, corn, potatoes, pasta and sugar.   -     Hemoglobin A1C; Future; Expected date: 02/06/2024    Other amnesia  -     CT Head Without Contrast; Future; Expected date: 02/06/2024    Tremors  -F/U with Neurology    F/U in 4 weeks to recheck blood pressure

## 2024-02-07 LAB — RPR SER QL: NORMAL

## 2024-02-09 ENCOUNTER — TELEPHONE (OUTPATIENT)
Dept: FAMILY MEDICINE | Facility: CLINIC | Age: 84
End: 2024-02-09
Payer: MEDICARE

## 2024-02-09 ENCOUNTER — HOSPITAL ENCOUNTER (OUTPATIENT)
Dept: RADIOLOGY | Facility: HOSPITAL | Age: 84
Discharge: HOME | End: 2024-02-09
Attending: FAMILY MEDICINE
Payer: MEDICARE

## 2024-02-09 DIAGNOSIS — R41.3 OTHER AMNESIA: ICD-10-CM

## 2024-02-09 PROCEDURE — 70450 CT HEAD/BRAIN W/O DYE: CPT | Mod: 26,,, | Performed by: RADIOLOGY

## 2024-02-09 PROCEDURE — 70450 CT HEAD/BRAIN W/O DYE: CPT | Mod: TC

## 2024-02-20 ENCOUNTER — LAB VISIT (OUTPATIENT)
Dept: LAB | Facility: HOSPITAL | Age: 84
End: 2024-02-20
Payer: MEDICARE

## 2024-02-20 ENCOUNTER — OFFICE VISIT (OUTPATIENT)
Dept: NEUROLOGY | Facility: CLINIC | Age: 84
End: 2024-02-20
Payer: MEDICARE

## 2024-02-20 ENCOUNTER — TELEPHONE (OUTPATIENT)
Dept: FAMILY MEDICINE | Facility: CLINIC | Age: 84
End: 2024-02-20
Payer: MEDICARE

## 2024-02-20 VITALS — WEIGHT: 94.94 LBS | HEIGHT: 60 IN | BODY MASS INDEX: 18.64 KG/M2

## 2024-02-20 DIAGNOSIS — E53.8 VITAMIN B12 DEFICIENCY: Primary | ICD-10-CM

## 2024-02-20 DIAGNOSIS — R41.3 MEMORY LOSS: ICD-10-CM

## 2024-02-20 DIAGNOSIS — G25.2 RESTING TREMOR: ICD-10-CM

## 2024-02-20 DIAGNOSIS — R41.3 MEMORY LOSS: Primary | ICD-10-CM

## 2024-02-20 DIAGNOSIS — E53.8 B12 DEFICIENCY: ICD-10-CM

## 2024-02-20 LAB — TSH SERPL DL<=0.005 MIU/L-ACNC: 2.48 UIU/ML (ref 0.4–4)

## 2024-02-20 PROCEDURE — 99213 OFFICE O/P EST LOW 20 MIN: CPT | Mod: PBBFAC | Performed by: PHYSICIAN ASSISTANT

## 2024-02-20 PROCEDURE — 84443 ASSAY THYROID STIM HORMONE: CPT | Performed by: PHYSICIAN ASSISTANT

## 2024-02-20 PROCEDURE — 99417 PROLNG OP E/M EACH 15 MIN: CPT | Mod: S$PBB,,, | Performed by: PHYSICIAN ASSISTANT

## 2024-02-20 PROCEDURE — 99999 PR PBB SHADOW E&M-EST. PATIENT-LVL III: CPT | Mod: PBBFAC,,, | Performed by: PHYSICIAN ASSISTANT

## 2024-02-20 PROCEDURE — 84425 ASSAY OF VITAMIN B-1: CPT | Performed by: PHYSICIAN ASSISTANT

## 2024-02-20 PROCEDURE — 86038 ANTINUCLEAR ANTIBODIES: CPT | Performed by: PHYSICIAN ASSISTANT

## 2024-02-20 PROCEDURE — 99215 OFFICE O/P EST HI 40 MIN: CPT | Mod: S$PBB,,, | Performed by: PHYSICIAN ASSISTANT

## 2024-02-20 PROCEDURE — 36415 COLL VENOUS BLD VENIPUNCTURE: CPT | Performed by: PHYSICIAN ASSISTANT

## 2024-02-20 RX ORDER — WITCH HAZEL 50 %
2000 PADS, MEDICATED (EA) TOPICAL DAILY
Qty: 90 TABLET | Refills: 1 | Status: SHIPPED | OUTPATIENT
Start: 2024-02-20 | End: 2024-04-30 | Stop reason: DRUGHIGH

## 2024-02-20 NOTE — TELEPHONE ENCOUNTER
Spoke to daughter, mom saw the neurologist this am, they tell her she needs b12 infusions but needs to go through her primary care to get it ordered please advise

## 2024-02-20 NOTE — PROGRESS NOTES
[facility] VISIT        Referring Provider: Mala Self       SUBJECTIVE:    History for Present Illness: Trudy Horvath is a 83 y.o.  female  who presents to me in clinic today for initial assessment and recommendations.    Patient has PMHx of tremor, HTN, and HLD who presents with concerns with memory and tremor. She is accompanied by her daughter (Henrietta) and her sister who assist with history. Patient had had a head/neck and tremor with activity for about 2 years. In October 2022, family began to note a mild decline in memory. In October 2023, more significant memory impairment was noted. They had a family bridal shower in October and other family members were commenting on her memory changes. Her daughter reports noticing a resting tremor in December 2023. Family states patient will forget recent events and repeatedly ask the same question. Daughter mentions patient has become more depressed recently because she has not been able to see her grandson.        Past Medical History:   Diagnosis Date    Hyperlipidemia     Hypertension     Increased glucose level     risk of diabetes    Macular degeneration (senile) of retina, unspecified     Osteoporosis, post-menopausal        Past Surgical History:   Procedure Laterality Date    BREAST BIOPSY      benign       Family History   Problem Relation Age of Onset    Heart disease Mother     Stroke Father           Current Outpatient Medications:     amLODIPine (NORVASC) 10 MG tablet, Take 1 tablet (10 mg total) by mouth once daily., Disp: 90 tablet, Rfl: 3    aspirin 81 MG Chew, Take 1 tablet (81 mg total) by mouth once daily., Disp: 90 tablet, Rfl: 3    carvediloL (COREG) 3.125 MG tablet, Take 1 tablet (3.125 mg total) by mouth 2 (two) times daily., Disp: 180 tablet, Rfl: 3    cholecalciferol, vitamin D3, (VITAMIN D3) 50 mcg (2,000 unit) Cap, Take 1 capsule by mouth once daily., Disp: , Rfl:     denosumab (PROLIA) 60 mg/mL Syrg, Inject 1 mL (60 mg  total) into the skin every 6 (six) months., Disp: 2 mL, Rfl: 3    docosahexanoic acid/vit C/lut (EYE HEALTH ORAL), Take 1 tablet by mouth once daily., Disp: , Rfl:     irbesartan-hydrochlorothiazide (AVALIDE) 300-12.5 mg per tablet, TAKE 1 TABLET BY MOUTH EVERY DAY, Disp: 90 tablet, Rfl: 2    ISTALOL 0.5 % DrpD, , Disp: , Rfl:     potassium chloride SA (K-DUR,KLOR-CON) 20 MEQ tablet, Take 1 tablet (20 mEq total) by mouth 2 (two) times daily., Disp: 180 tablet, Rfl: 3    rosuvastatin (CRESTOR) 20 MG tablet, Take 1 tablet (20 mg total) by mouth once daily., Disp: 90 tablet, Rfl: 3    tetrahydrozoline HCl/peg (EYE DROPS OPHT), Apply 1 tablet to eye once daily., Disp: , Rfl:        Review of Systems:   12 system review of systems is negative except for the symptoms mentioned in HPI.     Movement Review of Symptoms:  Anosmia: no  Dysarthria/Hypophonia: no dysarthria, intermittent hypophonia  Dysphagia/Sialorrhea: no  Depression: yes  Cognitive slowing: yes  Hallucinations: no  Impulsivity: no  Obsessions/Compulsions: no  Dyskinesia: no  Falls: no  Freezing: no  Micrographia: no  Sleep issues:  -KATELYN: no  -RBD: no    OBJECTIVE:    Ht 5' (1.524 m)   Wt 43.1 kg (94 lb 14.5 oz)   BMI 18.54 kg/m²     Physical Exam   Physical Exam  Vitals reviewed.   Constitutional:       General: She is not in acute distress.  HENT:      Head: Normocephalic and atraumatic.   Pulmonary:      Effort: No respiratory distress.   Skin:     General: Skin is warm and dry.   Neurological:      Mental Status: She is alert.        Neurologic Exam:  LOC: alert  Attention Span: poor  Language: No aphasia  Articulation: No dysarthria  Orientation: Not oriented to time  Visual Fields: Full  EOM (CN III, IV, VI): Full/intact  Facial Movement (CN VII): Symmetric facial expression    Motor: Arm left  Normal 5/5  Leg left  Normal 5/5  Arm right  Normal 5/5  Leg right Normal 5/5  Sensation: Intact to light touch, temperature and vibration  Tone: Normal tone  throughout        Movement exam  No hypophonic speech.   + mild facial masking  BL wrist cogwheel rigidity, R>L  + bradykinesia with ROM and finger tapping  + tremor with rest and activity   Normal-based gait. No shortened stride length. No abnormal arm swing.  No postural instability. Arises from chair with use of hands      ? Finger taps Finger flicks DU Heel taps   Left slowed normal slowed normal   Right slowed normal Slowed, worse than left normal                                                                                                                                                                                            Tremor Exam   Arms extended Resting   Left present in thumb and index finger present in thumb and index finger   Right present in thumb and index finger present in hand, wrist, and forearm   Head tremor: yes, left/right movement  Vocal Tremor: no  Leg tremor: negative       Relevant Labwork:  Recent Labs   Lab 02/06/24  1010   Hemoglobin A1C 5.6   LDL Cholesterol 141.6   HDL 60   Triglycerides 112   Cholesterol 224 H       Diagnostic Results:  CT Head 2/9/24  Impression:     No acute intracranial process.     There is mild to moderate volume loss that is diffuse in nature.     Relatively mild chronic small vessel ischemic changes are noted without evidence of prior cortical/territorial infarct.      Assessment/Plan:  1. Memory loss  -     TSH; Future; Expected date: 02/20/2024  -     VITAMIN B1; Future; Expected date: 02/20/2024  -     GAUDENCIO; Future; Expected date: 02/20/2024  -     MRI Brain Without Contrast; Future; Expected date: 02/20/2024    2. Resting tremor  -     MRI Brain Without Contrast; Future; Expected date: 02/20/2024  -     Ambulatory referral/consult to Neurology; Future; Expected date: 02/27/2024             I will plan on having [place] return to clinic in 6-8 weeks.  The patient can contact my office with any questions or concerns they may have as they arise in the  interim.       90 minutes of total time spent on the encounter, which includes face to face time and non-face to face time preparing to see the patient (eg, review of tests), Obtaining and/or reviewing separately obtained history, Documenting clinical information in the electronic or other health record, Independently interpreting results (not separately reported) and communicating results to the patient/family/caregiver, patient/family education and Care coordination (not separately reported).     Myla Cadena PA-C  Department of Neurology  [facility]

## 2024-02-20 NOTE — ASSESSMENT & PLAN NOTE
Trudy Horvath is a 83 y.o. female with PMHx of tremor, HTN, and HLD who presents with concerns with memory and tremor. She is accompanied by her daughter (Henrietta) and her sister who assisted with history. MOCA score 16 and B12 deficiency noted on labs. Exam also concerning for parkinsonism vs lewy body dementia.      Discussed plan with patient and family.    PLAN:  Message sent to patient's PCP to arrange B12 replacement  RTC in 6-8 weeks with repeat B12 labs, will repeat MOCA at that time  Referral sent to movement clinic  MRI brain, TSH, GAUDENCIO, and B1 pending

## 2024-02-20 NOTE — PATIENT INSTRUCTIONS
We will get you scheduled for your MRI and movement clinic appointment  Get your labs completed at your convenience  I will discuss vitamin B12 replacement with Dr. Guzman

## 2024-02-20 NOTE — TELEPHONE ENCOUNTER
----- Message from Yanna Dunham sent at 2/20/2024 12:45 PM CST -----  Type:  Needs Medical Advice    Who Called:  Pt Daughter   Would the patient rather a call back or a response via [facility]? Callback  Best Call Back Number: [phone] (Daughter)  Additional Information:  pt daughter is requesting a callback from this provider office in regards to B12

## 2024-02-21 LAB — ANA SER QL IF: NORMAL

## 2024-02-23 LAB — VIT B1 BLD-MCNC: 63 UG/L (ref 38–122)

## 2024-02-27 DIAGNOSIS — Z00.00 ENCOUNTER FOR MEDICARE ANNUAL WELLNESS EXAM: ICD-10-CM

## 2024-03-05 ENCOUNTER — PATIENT MESSAGE (OUTPATIENT)
Dept: ADMINISTRATIVE | Facility: HOSPITAL | Age: 84
End: 2024-03-05
Payer: MEDICARE

## 2024-03-06 ENCOUNTER — TELEPHONE (OUTPATIENT)
Dept: NEUROLOGY | Facility: CLINIC | Age: 84
End: 2024-03-06
Payer: MEDICARE

## 2024-03-06 ENCOUNTER — DOCUMENTATION ONLY (OUTPATIENT)
Dept: NEUROLOGY | Facility: HOSPITAL | Age: 84
End: 2024-03-06
Payer: MEDICARE

## 2024-03-06 ENCOUNTER — PATIENT MESSAGE (OUTPATIENT)
Dept: NEUROLOGY | Facility: CLINIC | Age: 84
End: 2024-03-06
Payer: MEDICARE

## 2024-03-06 ENCOUNTER — PATIENT OUTREACH (OUTPATIENT)
Dept: FAMILY MEDICINE | Facility: CLINIC | Age: 84
End: 2024-03-06
Payer: MEDICARE

## 2024-03-06 ENCOUNTER — OFFICE VISIT (OUTPATIENT)
Dept: FAMILY MEDICINE | Facility: CLINIC | Age: 84
End: 2024-03-06
Payer: MEDICARE

## 2024-03-06 VITALS
OXYGEN SATURATION: 97 % | SYSTOLIC BLOOD PRESSURE: 130 MMHG | DIASTOLIC BLOOD PRESSURE: 70 MMHG | HEART RATE: 77 BPM | HEIGHT: 60 IN | BODY MASS INDEX: 18.91 KG/M2 | WEIGHT: 96.31 LBS

## 2024-03-06 DIAGNOSIS — N18.31 STAGE 3A CHRONIC KIDNEY DISEASE: ICD-10-CM

## 2024-03-06 DIAGNOSIS — R41.3 MEMORY LOSS: ICD-10-CM

## 2024-03-06 DIAGNOSIS — E53.8 B12 DEFICIENCY: ICD-10-CM

## 2024-03-06 DIAGNOSIS — R41.3 MEMORY LOSS: Primary | ICD-10-CM

## 2024-03-06 DIAGNOSIS — E78.49 OTHER HYPERLIPIDEMIA: ICD-10-CM

## 2024-03-06 DIAGNOSIS — I10 ESSENTIAL HYPERTENSION, BENIGN: Primary | ICD-10-CM

## 2024-03-06 DIAGNOSIS — M81.0 OSTEOPOROSIS, POST-MENOPAUSAL: ICD-10-CM

## 2024-03-06 PROCEDURE — 99214 OFFICE O/P EST MOD 30 MIN: CPT | Mod: PBBFAC,PO | Performed by: FAMILY MEDICINE

## 2024-03-06 PROCEDURE — 99999 PR PBB SHADOW E&M-EST. PATIENT-LVL IV: CPT | Mod: PBBFAC,,, | Performed by: FAMILY MEDICINE

## 2024-03-06 PROCEDURE — 99215 OFFICE O/P EST HI 40 MIN: CPT | Mod: S$PBB,,, | Performed by: FAMILY MEDICINE

## 2024-03-06 NOTE — PROGRESS NOTES
Subjective:       Patient ID: Trudy Horvath is a 83 y.o. female.    Chief Complaint: Hypertension  84 yo female with memory loss, HTN, hyperlipidemia, LVH, diastolic dysfunction and macular degeneration presents for f/u of her chronic conditions. Pt recently diagnosed with vitamin B12 deficiency. Pt seen by Neurology on 2/20/24 for memory loss. MRI of brain, TSH, vitamin B1 and GAUDENCIO ordered. Pt to f/u with Neurology in April 2024.  Hypertension  This is a chronic problem. The current episode started more than 1 year ago. Pertinent negatives include no chest pain, palpitations or shortness of breath.   Daughter is preparing her medications. Daughter notes that patient has increased energy since starting Vitamin B12. Is eating better and has gained 2 lbs since last visit 2/6/24. Daughter notes writing is steadier. Is able to write checks better. Tremor has improved but still appears when anxious. Pt to see Neurology for her tremor on 4/1/24.  Results for orders placed or performed in visit on 02/20/24   VITAMIN B1   Result Value Ref Range    Thiamine 63 38 - 122 ug/L   GAUDENCIO   Result Value Ref Range    GAUDENCIO Screen Negative <1:80 Negative <1:80   TSH   Result Value Ref Range    TSH 2.478 0.400 - 4.000 uIU/mL    2/6/24: eGFR 55.2  Review of Systems   Constitutional:  Negative for chills and fever.   Respiratory:  Negative for shortness of breath.    Cardiovascular:  Negative for chest pain, palpitations and leg swelling.   Gastrointestinal:  Negative for abdominal pain, anal bleeding and blood in stool.   Genitourinary:  Negative for hematuria.       Objective:      Physical Exam  Vitals reviewed.   Constitutional:       General: She is not in acute distress.     Appearance: Normal appearance. She is not ill-appearing.   HENT:      Head: Normocephalic and atraumatic.      Right Ear: External ear normal.      Left Ear: External ear normal.   Eyes:      General:         Right eye: No discharge.         Left eye: No discharge.       Extraocular Movements: Extraocular movements intact.      Conjunctiva/sclera: Conjunctivae normal.   Neck:      Vascular: No carotid bruit.   Cardiovascular:      Rate and Rhythm: Normal rate and regular rhythm.      Pulses: Normal pulses.      Heart sounds: Murmur heard.   Pulmonary:      Effort: Pulmonary effort is normal.      Breath sounds: Normal breath sounds. No wheezing or rales.   Abdominal:      General: Bowel sounds are normal.      Palpations: Abdomen is soft. There is no mass.      Tenderness: There is no abdominal tenderness.   Musculoskeletal:      Cervical back: Normal range of motion and neck supple. No rigidity or tenderness.      Right lower leg: No edema.      Left lower leg: No edema.   Skin:     General: Skin is warm and dry.   Neurological:      Mental Status: She is alert.   Psychiatric:         Mood and Affect: Mood normal.         Assessment:       See plan  Plan:       Essential hypertension, benign: Stable    B12 deficiency:   - Continue vitamin B12 supplementation  -     Vitamin B12; Future; Expected date: 04/06/2024    Memory loss  -Mild improvement on vitamin B12 supplementation. MRI of brain on 3/11/24. F/U with Neurology    Stage 3a chronic kidney disease: Stable  -     Renal Function Panel; Future; Expected date: 04/06/2024    Osteoporosis, post-menopausal: Stable  - Continue Prolia every 6 months  -     DXA Bone Density Axial Skeleton 1 or more sites; Future; Expected date: 03/06/2024    Body mass index (BMI) less than 19: Stable    Other hyperlipidemia: Stable    F/U 4/30/24 for annual physical.

## 2024-03-06 NOTE — TELEPHONE ENCOUNTER
Spoke to Pt's daughter. Daughter stated she would not like to repeat the MOCA test especially since Pt completed one before and failed. She would like to keep the appt as is. Daughter stated if the provider has any questions then she can call her directly.     ----- Message from Myla Cadena PA-C sent at 3/6/2024  3:08 PM CST -----  Regarding: RE: PT'S PRIMARY IS SUGGESTING THAT PT DOESN'T NEED INFUSION AND B12 WASN'T THAT LOW  Contact: Beulah  Would it be possible to have her come in person instead of virtual? I need to repeat a moca on her and that is hard to do virtually.  ----- Message -----  From: Ninfa Garza MA  Sent: 3/6/2024   2:03 PM CST  To: Myla Cadena PA-C  Subject: RE: PT'S PRIMARY IS SUGGESTING THAT PT DOESN#    Hey,     I didn't know if anyone was covering your basket so I forwarded it to you. I spoke to Pt's daughter and she didn't mention the B12. Pt will be scheduled for a virtual follow up on April 10. This appt will be after her MRI and movement appt with Dr. Barrow.     Thank you,   Ninfa   ----- Message -----  From: Myla Cadena PA-C  Sent: 3/6/2024   1:15 PM CST  To: Ninfa Garza MA  Subject: RE: PT'S PRIMARY IS SUGGESTING THAT PT DOESN#    I just returned from vacation. I wanted to follow up and see if this still needs to be addressed?    -Myla  ----- Message -----  From: Ninfa Garza MA  Sent: 2/26/2024   2:31 PM CST  To: Myla Cadena PA-C  Subject: FW: PT'S PRIMARY IS SUGGESTING THAT PT DOESN#    Please advise. Thank you   ----- Message -----  From: Beth Mae  Sent: 2/26/2024  11:12 AM CST  To: Surinder Bhardwaj  Subject: PT'S PRIMARY IS SUGGESTING THAT PT DOESN'T N#    Pt's B12 is 181..Sister is requesting a call back because of the conflicting information.    Confirmed contact info below:  Contact Name: Trudy Horvath  Phone Number:    Beulah Pt's sister   [phone]

## 2024-03-06 NOTE — TELEPHONE ENCOUNTER
Called Pt's daughter to schedule an follow up appt (04/09/24). I was unable to speak with her but left a vm.     ----- Message from Maria C Guzman MD sent at 3/6/2024 11:19 AM CST -----  Regarding: Follow up Appointment  Good morning.     I am seeing our mutual patient. She and her daughter are wondering when she will have a follow-up appointment with you. Please contact he daughter regarding a follow-up appointment. Thank you.    Maria C Guzman MD

## 2024-03-06 NOTE — TELEPHONE ENCOUNTER
Spoke to Pt's daughter. Pt will be scheduled for a virtual visit on April 10 at 9:30 am.     ----- Message from Marquita Steele sent at 3/6/2024 12:24 PM CST -----  Regarding: Missed call  Contact: Henrietta at [phone]  Pt' daughter, Henrietta, is calling to speak with someone in provider's office. Pt is asking for a return call  regarding a missed call. Daughter is trying to schedule pt. please call Henrietta at [phone]

## 2024-03-07 NOTE — PROGRESS NOTES
Contacted patient's daughter (Henrietta) via phone to discuss her mother's B12 deficiency, resting tremor, and memory impairment. Henrietta states that Ms. Horvath has improved with B12 replacement; has gained weight and her tremor has improved. Henrietta reports persistent memory impairment.     We discussed how B12 deficiency can contribute to memory impairment and tremor. After her last visit, my physical exam and her moca score were concerning for an underlying movement disorder and cognitive disease (LBD vs Parkinson's with dementia). I referred her to movement clinic. My plan at our last visit was for Ms. Horvath to follow up with me after she was evaluated by a movement specialist; with a repeat moca test to be performed at the follow up visit.    Ms. Shrestha expressed concern that the moca test will likely be unchanged due to her mother's underlying anxiety regarding doctor visits. She also believes her mother would struggle with the moca test even without memory impairment. Ms. Shrestha is requesting further specialty memory testing for her mother. Appropriate referrals will be ordered for further evaluation.        [facility]

## 2024-03-11 ENCOUNTER — HOSPITAL ENCOUNTER (OUTPATIENT)
Dept: RADIOLOGY | Facility: HOSPITAL | Age: 84
Discharge: HOME | End: 2024-03-11
Attending: PHYSICIAN ASSISTANT
Payer: MEDICARE

## 2024-03-11 ENCOUNTER — HOSPITAL ENCOUNTER (OUTPATIENT)
Dept: RADIOLOGY | Facility: HOSPITAL | Age: 84
Discharge: HOME | End: 2024-03-11
Attending: FAMILY MEDICINE
Payer: MEDICARE

## 2024-03-11 DIAGNOSIS — R41.3 MEMORY LOSS: ICD-10-CM

## 2024-03-11 DIAGNOSIS — G25.2 RESTING TREMOR: ICD-10-CM

## 2024-03-11 DIAGNOSIS — M81.0 OSTEOPOROSIS, POST-MENOPAUSAL: ICD-10-CM

## 2024-03-11 PROCEDURE — 77080 DXA BONE DENSITY AXIAL: CPT | Mod: 26,,, | Performed by: RADIOLOGY

## 2024-03-11 PROCEDURE — 77080 DXA BONE DENSITY AXIAL: CPT | Mod: TC

## 2024-03-11 PROCEDURE — 70551 MRI BRAIN STEM W/O DYE: CPT | Mod: 26,,, | Performed by: RADIOLOGY

## 2024-03-11 PROCEDURE — 70551 MRI BRAIN STEM W/O DYE: CPT | Mod: TC

## 2024-03-25 ENCOUNTER — TELEPHONE (OUTPATIENT)
Dept: NEUROLOGY | Facility: CLINIC | Age: 84
End: 2024-03-25
Payer: MEDICARE

## 2024-03-25 NOTE — TELEPHONE ENCOUNTER
Reached out to pt to inform pt was incorrectly scheduled for an in-person appt on a day provider is VV only. A vm was left for pt to call back

## 2024-04-16 NOTE — PROGRESS NOTES
NEUROPSYCHOLOGY   [facility]  Intake    Referring Provider: Myla Cadena, DAVID   Medical Necessity: Ms. Trudy Horvath is an 83 y.o. female followed in [facility] for cognitive evaluation, supportive therapy, treatment planning, and treatment management in the setting of memory loss and rest tremor  Billing: See billing table at the end of this note  Consent: The patient expressed an understanding of the purpose of the evaluation and consented to all procedures.  Providers: Michelle Cat PsyD (Neuropsychology); Xin Castillo DNP (Neurology); Zuleyma Olguin MD (Palliative Medicine)       ASSESSMENT:   Ms. Trudy Horvath is an 83 y.o. female with a history of HTN, HLD, CKD3, rest tremor here for cognitive evaluation. Dtr describes steadily worsening cognition over the last year, though she did describe two possible confusional episodes as well (?cognitive fluctuations). She lives alone with dtr staying with her during the day and several nights per week. Pt does have some increased anxiety. No VH. Sleep is ok, no RBD. Does have chronic constipation, difficulty swallowing large pills. Has hearing loss, has not seen audiology. Was losing weight, but this has stabilized. She walks unassisted, but is unsteady and dtr worries she will fall. Had intention tremor for last 5 years, dtr noticed rest tremor in the last few months. Father had Parkinson's disease. She is not oriented, and is dependent for IADLs.     She is parkinsonian on exam with Dr. Castillo today.     Cognitive Testing:   - Impairments in memory recall (12 - 25% retention) and consolidation, semantic access, processing speed, and executive functioning  - Simple attention is low end of normal  - Visuospatial skills are broadly intact after accounting for executive interference. Not showing the pronounced VS impairment typical of DLB.   - She is not oriented to date   - Minimal behavioral symptoms per caregiver report, though cg is feeling  overwhelmed with other family issues     Cognitive testing is most suggestive of mild/moderate dementia due to Alzheimer's disease. Underlying parkinsonism raises concern for PDD, though it would be early for her to be showing cognitive decline in PDD and her testing is less suggestive of this. Testing is also not frankly suggestive of DLB, lacks characteristic VS impairments, and she also does not have the timeline or other clinical features of DLB either. She does have difficulty with hearing, but this would not explain test performance today.     It is possible her cognitive deficits will evolve to resemble DLB or PDD, and we will continue to monitor. Given that neurodegenerative disease commonly co-occur, I am suspicious for underlying comorbid PD and AD.     She will need continued support from family. Agree with the plan for dtr and pt to eventually live together. In the mean time, she is a fall risk. Discussed life alert or similar device, cameras, etc. She also discussed PT referral with Dr Neal.       1. 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 Memory loss  Ambulatory referral/consult to Adult Neuropsychology    Ambulatory referral/consult to Home Health      3. Gait instability  Ambulatory referral/consult to Home Health            PLAN:     RTC next available (sched for 5/16)  They were scheduled for a feedback call on 4/24 but looks like appt was cancelled   Caregiver following with Alfreda Dickens LCSW for caregiver support.  Dr Neal ordered  PT today        Thank you for allowing me to participate in Ms. Horvath's care.  If you have any questions, please contact me.    Michelle Cat PsyD  Licensed Clinical Neuropsychologist  [facility] - Department of Neurology          HISTORY:     HPI: Ms. Trudy Horvath is an 83 y.o. female with a history of HTN, HLD, CKD3, rest tremor referred by neurology for evaluation of cognitive changes. Pt is accompanied today by her  daughter, Henrietta.     Seen recently by Myla VUONG in neurology for evaluation of rest tremor (since Dec 2023, but head/neck tremor with activity since about ) and memory impairment (since Oct 2022). Labs found low B12, supplementation did seem to improve tremor. However, still concern for underlying movement disorder (DLB vs PDD). Referred to movement disorder clinic. MoCA complicated by anxiety re: doctor visits. Family has noticed memory issues. Forgetful of recent events, repetitious. Increased depression and anxiety recently.     Resides: alone in a house   Level of supervision: daughter is usually with her during the day and stays the night a few times a week. She is alone evenings and some nights   Sitter/HHA: No  Caregiver: Henrietta, only daughter   POA: Yes - Henrietta  Medications for cognition: None    Current Cognitive Symptoms:  Symptoms: Forgetful for conversations. Not getting lost. Attention is ok. Says she doesn't do any planning or organizing. No real word finding issues. Anything that upsets her she will call dtr over and over to ask what time they have to go, etc. At one point, was trying to use remote control as phone. Picking up grilled oysters and trying to sip them. Second instance was in the context of being on vacation.   Orientation: Knows the day of the week. Says it's . Knows her  and age. Knows where she is.   Course: Gradual onset about a year ago. Seems pretty steady worsening. Did have two episodes of increased confusion lasting about an hour, one of which was during a vacation out of town.     Current Neurobehavioral Symptoms:  Depression: situationally some depressed mood but no anhedonia. Spends a lot of time in bed watching TV talking to friend on the phone.    Anxiety: Yes - worries about a lot. Maybe this is worse. Upset about some family dynamics.   Paranoia/Delusions: No  Hallucinations: No  Agitation: Sometimes more cranky than usual  Apathy/Indifference:  "spends a lot of time in bed.    Disinhibition/Impulsivity: No  Substance Use: No      Current Physical Symptoms: See Dr. Castillo's note for physical and neuro exam  Swallowing: Has trouble with large pills, dtr crushes and dissolves in water. Not coughing or choking when she is eating or drinking.   Incontinence/Constipation: No incontinence. Does have chronic constipation "forever"  Ambulation: maybe shuffling more now. Not using cane or walker.   Falls: No   Sleep: No sleep problems. Denies RBD  Appetite change: Did lose weight (about 10lbs), had gained a few lbs back recently. They think it was due to medication side effects, appetite seems improved now. Eating more sweets recently.   Movement Sx: Rest tremor in head and hands. Dtr notes this is worse when she is nervous. Has had intention tremor for about 5 years, but dtr has only noticed rest tremor since about December. Dad had PD.   Repeat UTI/Delirium: No   Hearing/vision loss: Yes - Macular degeneration in one eye. Hearing is "not that good." Dtr thinks she probably needs hearing aids. Has not seen audiology yet     ADL:  Bathing/Grooming: Independent  Feeding: Independent  Dressing: Independent  Toileting: Independent    IADL:  Finances: Requires assistance/oversight. Most bills on autopay. One that wasn't she was forgetting to pay. Dtr handles this now. Last 6 months.   Medications: Dependent, Forgets to take on her own. Needs dtr's reminders. Dtr didn't realize she wasn't taking correctly until about 3 months ago.   Driving: Does not participate in this activity, stopped after her car broke down a few years ago.   Household: Doesn't really cook. Has left the water running a few times. Does ok with laundry. Used to be very clean and now tends to leave dishes in the sink, which is unlike her.         4/19/2024    12:42 PM   IADL   Ability to Use Telephone Operates telephone on own initiative, looks up and dials numbers   Shopping Needs to be accompanied on " any shopping trip   Food Preparation Needs to have meals prepared and served   Housekeeping Performs light daily tasks such as dishwashing, bed making   Laundry Does personal laundry completely   Mode of Transportation Travel limited to taxi or automobile with assistance of another   Responsibility for Own Medications Is not capable of dispensing own medication   Ability to Handle Finances Manages day-to-day purchases, but needs help with banking, major purchases, etc          Safety Concerns:  Hygiene: No  Nutrition: Yes - has lost some weight, need to watch   Falls: No, but dtr worries she is going to fall. They would like her to move in with them. They would like to get her a life alert. No cameras in the house.   Wandering: No  Financial scams: No  Medication management: Yes - dtr managing mow  Driving: No  Cooking: No  Medical decision making: No  Physical aggression:No    Neurologic History:  TBI: No  Seizures: No  Stroke: No  Recent Hospitalizations/surgeries: No    Social History:  Family Status: , one daughter   Daily Activities: watches a lot of TV  Educational Level: 12, graduated HS   Occupational Status and History: worked at [employer] until she had her daughter, after that was [employer]    PMH:   Ms. Trudy Horvath  has a past medical history of Hyperlipidemia, Hypertension, Increased glucose level, Macular degeneration (senile) of retina, unspecified, and Osteoporosis, post-menopausal.      Current Outpatient Medications:     amLODIPine (NORVASC) 10 MG tablet, Take 1 tablet (10 mg total) by mouth once daily., Disp: 90 tablet, Rfl: 3    aspirin 81 MG Chew, Take 1 tablet (81 mg total) by mouth once daily., Disp: 90 tablet, Rfl: 3    carvediloL (COREG) 3.125 MG tablet, Take 1 tablet (3.125 mg total) by mouth 2 (two) times daily., Disp: 180 tablet, Rfl: 3    cholecalciferol, vitamin D3, (VITAMIN D3) 50 mcg (2,000 unit) Cap, Take 1 capsule by mouth once daily., Disp: , Rfl:     cyanocobalamin (VITAMIN  B-12) 2000 MCG tablet, Take 1 tablet (2,000 mcg total) by mouth once daily., Disp: 90 tablet, Rfl: 1    denosumab (PROLIA) 60 mg/mL Syrg, Inject 1 mL (60 mg total) into the skin every 6 (six) months., Disp: 2 mL, Rfl: 3    docosahexanoic acid/vit C/lut (EYE HEALTH ORAL), Take 1 tablet by mouth once daily., Disp: , Rfl:     irbesartan-hydrochlorothiazide (AVALIDE) 300-12.5 mg per tablet, TAKE 1 TABLET BY MOUTH EVERY DAY, Disp: 90 tablet, Rfl: 2    ISTALOL 0.5 % DrpD, , Disp: , Rfl:     potassium chloride SA (K-DUR,KLOR-CON) 20 MEQ tablet, Take 1 tablet (20 mEq total) by mouth 2 (two) times daily., Disp: 180 tablet, Rfl: 3    rosuvastatin (CRESTOR) 20 MG tablet, Take 1 tablet (20 mg total) by mouth once daily., Disp: 90 tablet, Rfl: 3    tetrahydrozoline HCl/peg (EYE DROPS OPHT), Apply 1 tablet to eye once daily. (Patient not taking: Reported on 4/18/2024), Disp: , Rfl:     Results for orders placed or performed during the hospital encounter of 03/11/24   MRI Brain Without Contrast    Narrative    EXAMINATION:  MRI BRAIN WITHOUT CONTRAST    CLINICAL HISTORY:  Memory loss;Parkinsonian syndrome; Other specified forms of tremor    TECHNIQUE:  Multiplanar multisequence MR imaging of the brain was performed without contrast.    COMPARISON:  CT scan of the head dated 02/09/2024.    FINDINGS:  The craniocervical junction is intact.  The sellar and parasellar structures are unremarkable.  The midline structures are intact.  The intracranial flow voids are within normal limits.    No acute diffusion-weighted signal abnormality is present.  There are T2/FLAIR signal hyperintensities within the periventricular and subcortical white matter.  There is abnormal T2/FLAIR signal within the medial thalami, midbrain, and the central j luis.  There is focus of GRE signal dropout within the left aspect of the midbrain and the central j luis.  There are no extra-axial fluid collections.  There is no evidence of mass effect.    There are  postoperative changes in the globes.  The paranasal sinuses are unremarkable.  The mastoid air cells are clear.  The calvarium is intact.      Impression    Abnormal T2/FLAIR signal hyperintensity within the medial thalami, midbrain, and the j luis.  The findings remain nonspecific.    GRE susceptibility within the left aspect of midbrain and the central j luis, suggestive of prior hemosiderin deposition.    Changes of chronic vessel ischemic disease and cerebral volume loss.      Electronically signed by: Erick Suh MD  Date:    03/11/2024  Time:    11:28   Results for orders placed or performed during the hospital encounter of 02/09/24   CT Head Without Contrast    Narrative    EXAMINATION:  CT HEAD WITHOUT CONTRAST    CLINICAL HISTORY:  Memory loss; Other amnesia    TECHNIQUE:  Low dose axial images were obtained through the head.  Coronal and sagittal reformations were also performed. Contrast was not administered.    COMPARISON:  None.    FINDINGS:  There is mild to moderate volume loss that is diffuse in nature and not particularly focal.    No hydrocephalus mass effect intracranial hemorrhage or acute territorial infarct.    Decreased attenuation within the periventricular and subcortical white matter is nonspecific but may reflect mild chronic small vessel ischemic change without evidence of prior cortical or territorial infarct.    Atherosclerotic calcifications are noted at the skull base.    The calvarium is intact the visualized sinuses and mastoid air cells are clear.      Impression    No acute intracranial process.    There is mild to moderate volume loss that is diffuse in nature.    Relatively mild chronic small vessel ischemic changes are noted without evidence of prior cortical/territorial infarct.      Electronically signed by: Wilmer Brito  Date:    02/09/2024  Time:    09:27       Pertinent Labs:  Lab Results   Component Value Date    [id number] 181 (L) 02/06/2024     No results found for:  ""VITAMINB1"  No components found for: "VITAMIND"  Lab Results   Component Value Date    RPR Non-reactive 02/06/2024     Lab Results   Component Value Date    FOLATE 11.1 02/06/2024     Lab Results   Component Value Date    TSH 2.478 02/20/2024     Lab Results   Component Value Date    PTH 26.0 02/19/2019    CALCIUM 10.0 02/06/2024    CAION 1.30 02/19/2019      Lab Results   Component Value Date    HGBA1C 5.6 02/06/2024     Lab Results   Component Value Date    [id number] Non-reactive 02/06/2024         OBJECTIVE:     MENTAL STATUS AND BEHAVIORAL OBSERVATIONS:  Appearance:  Casually dressed and Well groomed  Behavior:   alert, calm, cooperative, rapport easily established, and Appropriate interpersonal skills. Good interpretation of nonverbal cues.   Orientation:   Disoriented to date  Sensory:   Hearing and vision appeared adequate for testing purposes. A little hard of hearing.   Gait:   slow and shuffling  Psychomotor:  Head tremor, RUE rest tremor   Speech:  Fluent and spontaneous. Normal volume, rate, pitch, tone, and prosody.  Language:  Receptive and expressive language appear intact. Comprehends conversational speech., No evidence of word-finding difficulties in conversational speech.  Mood:   anxious  Affect:   mood-congruent  Thought Process: goal directed and linear  Thought Content: Denied current SI/HI. and No evidence of psychotic symptoms.  Memory:  limited historian  Attn/Concentration:  Grossly intact  Judgment/Insight: Grossly intact  Validity:   Performance on standalone and/or embedded performance validity measures suggested variable test engagement. Behaviorally, the pt tended to give up easily, was reluctant to guess, and appeared anxious, which appeared to periodically impact test engagement. As a result, some results may underestimate the pt's actual abilities. However, as her scores are generally commensurate with her overall functional level, they are considered a largely accurate reflection " "of her abilities.   Test Taking Bx:         Per psychometrist observations: Throughout testing, when the pt became frustrated she stated: "I'm too old for this," "I just don't want to do this," and "this just isn't my thing." The pt refused to complete Trails B and RCFT, so RCFT and Trails B were discontinued. The pt exhibited poor frustration tolerance on more difficult tasks. Pt required frequent repetition of instructions. Pt was easily discouraged, exhibited negative self-talk, and required frequent reassurance and encouragement.    PROCEDURES/TESTS ADMINISTERED:  In addition to performing a review of pertinent medical records, reviewing limits to confidentiality, conducting a clinical interview, and explaining procedures, the following measures were administered and scored using normative data and administration instructions from Edmund et al, 2019:   Mini Mental Status Exam (MMSE); Animal Fluency; Trail Making Test; Consortium to Establish a Registry for Alzheimer's Disease (CERAD) - Word List, Constructional Praxis; Sturdivant Naming Test - 15 Item. CERAD Praxis Recall was scored using norms from Kamran et al, 2011. Pt was also administered the Wechsler Adult Intelligence Scale - Third Edition, Digit Span subtest; and Geriatric Depression Scale - 15 item; Clock Drawing Test (Schfranklynlen et al, 2006); Letter Fluency (CFL, MOANS/MOAANS norms); Issac Chalino IADL scale and caregiver filled out the Neuropsychiatric Inventory Questionnaire (NPI-Q). Discontinued [place] Figure copy      NEUROPSYCHOLOGICAL ASSESSMENT RESULTS:  The following should not be interpreted in isolation from the neuropsychological evaluation report.  Scores on stand-alone and/or embedded performance validity measures were borderline.      Raw Score Score Type Standardized Score Percentile/CP Descriptor   ACS RDS 6 - - - -   COGNITIVE SCREENING Raw Score Score Type Standardized Score Percentile/CP Descriptor   MMSE 16 Tscore <16 <1 " Exceptionally Low    Orientation - Place 4/5 - - - -   Orientation - Date 2/5 - - - -   LANGUAGE FUNCTIONING Raw Score Score Type Standardized Score Percentile/CP Descriptor   BNT-15 11 Tscore 31 3 Below Average   CFL 14 ss 5 5 Below Average   Animal Naming 10 Tscore 39 14 Low Average   VISUOSPATIAL FUNCTIONING Raw Score Score Type Standardized Score Percentile/CP Descriptor   RCFT Copy DC - - - -   RCFT Time to Copy 65 - - - -   Clock Request 3 - - 7 Below Average   Clock Copy 4 - - 11.6 Low Average   Clock Total 7 - - 2.3 Below Average   CERAD Constructional Praxis 8 Tscore 43 24 Low Average   LEARNING & MEMORY Raw Score Score Type Standardized Score Percentile/CP Descriptor   CERAD Word List         Trial 1 3 Tscore 39 14 Low Average   Trial 2 6 Tscore 45 31 Average   Trial 3 4 Tscore 26 1 Exceptionally Low    Trials 1-3 13 Tscore 34 5 Below Average   Delayed Recall 1 Tscore 22 0.3 Exceptionally Low    % Savings 25 Tscore 28 1 Exceptionally Low    Recall - Yes 10 Tscore 55 69 Average   Recall - No 4 Tscore <13 <1 Exceptionally Low    Praxis Recall 1 Tscore 43 25 Average   ATTENTION/WORKING MEMORY Raw Score Score Type Standardized Score Percentile/CP Descriptor   WAIS-III Digit Span 9 ss 6 9 Low Average         DS Forward 6 - - - -         DS Backward 3 - - - -   MENTAL PROCESSING SPEED Raw Score Score Type Standardized Score Percentile/CP Descriptor   TMT A  161 Tscore 23 1 Exceptionally Low    TMT A errors 3 - - - -   EXECUTIVE FUNCTIONING Raw Score Score Type Standardized Score Percentile/CP Descriptor   TMT B DC ss - - BNL   TMT B errors 1 - - - -   TMT B (oral version) DC zscore - - BNL   TMT B (oral version) errors 1 - - - -   Clock Request 3 - - 7 Below Average   MOOD & PERSONALITY Raw Score Score Type Standardized Score Percentile/CP Descriptor   GDS-15 1 - - - WNL   ss = scaled score (mean = 10, SD = 3); SS = standard score (mean = 100, SD = 15); Tscore mean = 50, SD = 10; zscore (mean = 0.00, SD = 1)                4/19/2024    12:43 PM   NPIQ RFS   WHO IS FILLING OUT FORM? Caregiver   Does this patient have false beliefs, such as thinking that others are stealing from him/her or planning to harm him/her in some way? No   Does this patient have hallucinations such as false visions or voices? [place] she/he seem to hear or see things that are not present? No   Is the patient resistive to help from others at times, or hard to handle? No   Does the patient seem sad or say that he/she is depressed? No   Does the patient become upset when  from you? Does he/she have any other signs of nervousness such as shortness of breath, sighing, being unable tor elax, or feeling excessively tense? Yes   Anxiety Severity 2   Anxiety Distress 1   Does the patient appear to feel good or act excessively happy? No   Does this patient seem less interested in his/her usual activities or in the activities and plans of others? Yes   Apathy/Indifference Severity 2   Apathy/Indifference Distress 1   Does this patient seem to act cumpolsively, for example, talking to strangers as if she/he knows them, or saying things that may hurt people's feelings? No   Is the patient impatient and cranky? Does he/she have difficulty coping with delays or waiting for planned activities? Yes   Irritability/Liability Severity 2   Irritability/Liability Distress 1   Does the patient engage in repetitive activities such as pacing around the house, handling buttons, wrapping string, or doing other things repeatedly? No   Does this patient awaken you during the night, rise too early in the morning, or take excessive naps during the day? No   Has the patient lost or gained weight, or had a change in the type of food he/she likes? Yes   Apetitie/Eating Severity 2   Apetite/Eating Distress 1   NPI Total Severity Score 8   NPI Total Distress Score 4             Billing/Services Summary          Neurobehavioral Status Exam Base Code (51289)  Total Units: 1 Add-On  (75642)  Total Units: 0   Face-to-face Total Time: 40 min.    Report Writing Total Time: 0 min         Professional Neuropsychological Testing Evaluation Services Base Code (47312)   Total Units: 1 Add-on (22350)  Total Units: 2   Referral review/initial test selection 15 min.    Intra-session Clinical Decision-Making 0 min.         Tech consult/test review/modification 5 min.         Patient behavior management 0 min.    Face-to-face Interpretive Feedback 60 min.    Record Review/Integration/Report Generation 120 min.     Total Time: 200 min.         Test Administration by Psychologist Base Code (74582)   Total Units: 0 Add-on (72082)  Total Units: 0   Testing 0 min.    Scoring 0 min.     Total Time: 0 min.         Test Administration by Technician  Technician Name: TALAT Gallagher Base Code (11324)   Total Units: 1 Add-on (76621)  Total Units: 3   Face-to-Face Testin min.    Scoring 60 min.     Total Time: 113 min.    DOS is the date of the evaluation unless specified

## 2024-04-17 NOTE — PROGRESS NOTES
Name: Trudy Horvath  MRN: [id number]   CSN: [id number]      Date: 4-18-24      Referring physician:  Myla Cadena PA-C  [address] [place] [address]    Subjective:      Chief Complaint: memory loss and tremor     History of Present Illness (HPI):    Trudy Horvath is a 83 y.o. female who presents today for an initial evaluation of memory loss and tremor is accompanied by daughter, Henrietta (only child and POA). She was recently evaluated by NATHALIA Cadena on 2-20-24 for concerns about her memory and tremor.     Hand shaking, especially when nervous.     Dtr says has noticed a little changes in memory.    Lives alone in house. .   Dtr usually with her during the day and sometimes sleeps there couple nights a week.   No other assistance.  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       Ms. Horvath does not feel her memory is bad.   Dtr does have to repeat.  She has not gotten lost.   Does not seem to have trouble with attention.  She does not feel it is harder to organize or plan. Dtr shakes head that she does have trouble with this.   She does not have trouble finding words.       Dtr noted changes the past year. She feels like going to doctor or something that upsets her will prompt multiple calls about day and time of appt. Dtr tries not to tell her until appt gets closer.   She does not notice fluctuation in cognition.   Grass man will come to collect money and she cannot remember if he has already come to collect.     Always been a worrier and may worry a litle more.       B12 seems to have calmed tremor some.   Can write checks now and she could not do this before she started B12.   She started B12 after last visit with NATHALIA Cadena.     Used remote control a couple of times as phone.  Had one epidose where she did not seem herself. She was eating oysters and did not seem to know how to eat them. This occurred at restaurant in [place].     She thinks her hands shake on both sides and probably has for about 5  years. Recalls she would shake more if holding plate. Noticed at rest in December.  She recalls that her dad would shake. He had Parkinson's disease.     When she is away (out of town) she seems better cognitively.     Will need reminers to take her medicine. Dtr sets up pills.     Vioice lower couple mos ago but does not notice as much now.     Does not smoke or drink.     Sister seems to think she shuffles a little.   Does not use cane or walker.   She has not fallen.     She was losing weight but was not taking the right medicine. Wasn't taking correctly.   Was low on b12 and got her on this.   Appetite better.   Likes junk food.     Most of her bills are auto pay. Dtr manages most. She has been doing this more the past 6 mos.   She has needed med reminders the past few mos. Started using pill boxes.     She does not drive. Stopped when car broke 2.5 years ago.       Does not cook anymore.  Left water running on occasion.   Used to always keep a clean sink and now there are dishes in sink shiw chd would not have done a year ago.     Dtr worries that she would fall when she is home at night.       Review of Systems   Constitutional:  Negative for appetite change.   HENT:  Positive for drooling and hearing loss (currently no hearing aids). Negative for trouble swallowing.         Smell intact   Eyes:  Positive for visual disturbance (macular degneration).   Respiratory:  Negative for cough and choking.    Gastrointestinal:  Positive for constipation (always have (longstanding)).   Musculoskeletal:  Positive for neck pain. Negative for back pain and gait problem.   Neurological:  Positive for tremors. Negative for dizziness, seizures and light-headedness.        No stroke  No hx head injury   Psychiatric/Behavioral:  Positive for dysphoric mood (little- situational). Negative for hallucinations and sleep disturbance. The patient is nervous/anxious.         Dtr feels she is sometimes more cranky       Past Medical  History: The patient  has a past medical history of Hyperlipidemia, Hypertension, Increased glucose level, Macular degeneration (senile) of retina, unspecified, and Osteoporosis, post-menopausal.    Social History: The patient  reports that she has never smoked. She has never been exposed to tobacco smoke. She has never used smokeless tobacco. She reports that she does not drink alcohol and does not use drugs.    Family History: Their family history includes Heart disease in her mother; Stroke in her father.    Allergies: Patient has no known allergies.     Meds:   Current Outpatient Medications on File Prior to Visit   Medication Sig Dispense Refill    amLODIPine (NORVASC) 10 MG tablet Take 1 tablet (10 mg total) by mouth once daily. 90 tablet 3    aspirin 81 MG Chew Take 1 tablet (81 mg total) by mouth once daily. 90 tablet 3    carvediloL (COREG) 3.125 MG tablet Take 1 tablet (3.125 mg total) by mouth 2 (two) times daily. 180 tablet 3    cholecalciferol, vitamin D3, (VITAMIN D3) 50 mcg (2,000 unit) Cap Take 1 capsule by mouth once daily.      cyanocobalamin (VITAMIN B-12) 2000 MCG tablet Take 1 tablet (2,000 mcg total) by mouth once daily. 90 tablet 1    denosumab (PROLIA) 60 mg/mL Syrg Inject 1 mL (60 mg total) into the skin every 6 (six) months. 2 mL 3    docosahexanoic acid/vit C/lut (EYE HEALTH ORAL) Take 1 tablet by mouth once daily.      irbesartan-hydrochlorothiazide (AVALIDE) 300-12.5 mg per tablet TAKE 1 TABLET BY MOUTH EVERY DAY 90 tablet 2    ISTALOL 0.5 % DrpD  (Patient not taking: Reported on 4/18/2024)      potassium chloride SA (K-DUR,KLOR-CON) 20 MEQ tablet Take 1 tablet (20 mEq total) by mouth 2 (two) times daily. 180 tablet 3    rosuvastatin (CRESTOR) 20 MG tablet Take 1 tablet (20 mg total) by mouth once daily. 90 tablet 3    tetrahydrozoline HCl/peg (EYE DROPS OPHT) Apply 1 tablet to eye once daily. (Patient not taking: Reported on 4/18/2024)       No current facility-administered medications  on file prior to visit.       Objective:     Physical Exam:    Vitals:    24 0822   BP: (!) 162/85   BP Location: Right arm   Patient Position: Sitting   BP Method: Medium (Automatic)   Pulse: 90   Weight: 44.9 kg (98 lb 15.8 oz)     Body mass index is 19.33 kg/m².    Constitutional  Well-developed, well-nourished, appears stated age   Cardiovascular  Radial pulses 2+ and symmetric, no LE edema bilaterally       ..  Neurological    * Mental status     - Orientation Oriented to: person, month,age, , place  Not oriented to: year (, date (- only off one day)     - Memory     Impaired     - Attention/concentration  Attends to interview without distraction     - Language  fluent     ..  * Cranial nerves       - CN II  PERRL, visual fields full to confrontation     - CN III, IV, VI  Extraocular movements full, normal pursuits and saccades     - CN V  Sensation V1 - V3 intact     - CN VII  Face strong and symmetric bilaterally     - CN VIII  Hearing intact bilaterally     - CN IX, X  Palate raises midline and symmetric     - CN XI  SCM and trapezius 5/5 bilaterally     - CN XII  Tongue midline   * Coordination  No dysmetria with finger-to-nose   * Gait  See below.     ..  * Specialized movement exam Normal blink reflex   No hypophonic speech.    No facial masking.    Cogwheel rigidity.    Neck-1  RUE-1  LUE-near 2  RLE-0  LLE-2       Bradykinesia:  RFT-1  LGT2  RHG-0  LHG1  RPS0  LPS-1  SKYLER bit uncoordinated     Rest tremor noted L thumb and intermittently slight head tremor (no-no), subtle RH tremor intermittent , bit more irregular    Posture-slight LH but increase on repose   Kinesis-none  Intention-slt tonear mild BH     Left shoulder elevation  Slight hypertrophy Left trapezius    Arises slowly without push uses legs to brace agaisnt sofa  Slt broad based  Short step  Leans to side  Pace slow  Bit unsteady- wearing heels  Does get bit off balance with turns                     Laboratory Results:  Lab  Visit on 02/20/2024   Component Date Value Ref Range Status    Thiamine 02/20/2024 63  38 - 122 ug/L Final    GAUDENCIO Screen 02/20/2024 Negative <1:80  Negative <1:80 Final    TSH 02/20/2024 2.478  0.400 - 4.000 uIU/mL Final   Lab Visit on 02/06/2024   Component Date Value Ref Range Status    Cholesterol 02/06/2024 224 (H)  120 - 199 mg/dL Final    Triglycerides 02/06/2024 112  30 - 150 mg/dL Final    HDL 02/06/2024 60  40 - 75 mg/dL Final    LDL Cholesterol 02/06/2024 141.6  63.0 - 159.0 mg/dL Final    HDL/Cholesterol Ratio 02/06/2024 26.8  20.0 - 50.0 % Final    Total Cholesterol/HDL Ratio 02/06/2024 3.7  2.0 - 5.0 Final    Non-HDL Cholesterol 02/06/2024 164  mg/dL Final    Sodium 02/06/2024 141  136 - 145 mmol/L Final    Potassium 02/06/2024 3.9  3.5 - 5.1 mmol/L Final    Chloride 02/06/2024 107  95 - 110 mmol/L Final    CO2 02/06/2024 24  23 - 29 mmol/L Final    Glucose 02/06/2024 108  70 - 110 mg/dL Final    BUN 02/06/2024 22  8 - 23 mg/dL Final    Creatinine 02/06/2024 1.0  0.5 - 1.4 mg/dL Final    Calcium 02/06/2024 10.0  8.7 - 10.5 mg/dL Final    Total Protein 02/06/2024 7.5  6.0 - 8.4 g/dL Final    Albumin 02/06/2024 3.7  3.5 - 5.2 g/dL Final    Total Bilirubin 02/06/2024 0.5  0.1 - 1.0 mg/dL Final    Alkaline Phosphatase 02/06/2024 59  55 - 135 U/L Final    AST 02/06/2024 18  10 - 40 U/L Final    ALT 02/06/2024 10  10 - 44 U/L Final    eGFR 02/06/2024 55.2 (A)  >60 mL/min/1.73 m^2 Final    Anion Gap 02/06/2024 10  8 - 16 mmol/L Final    WBC 02/06/2024 8.63  3.90 - 12.70 K/uL Final    RBC 02/06/2024 4.26  4.00 - 5.40 M/uL Final    Hemoglobin 02/06/2024 12.5  12.0 - 16.0 g/dL Final    Hematocrit 02/06/2024 39.2  37.0 - 48.5 % Final    MCV 02/06/2024 92  82 - 98 fL Final    MCH 02/06/2024 29.3  27.0 - 31.0 pg Final    MCHC 02/06/2024 31.9 (L)  32.0 - 36.0 g/dL Final    RDW 02/06/2024 13.2  11.5 - 14.5 % Final    Platelets 02/06/2024 338  150 - 450 K/uL Final    MPV 02/06/2024 10.4  9.2 - 12.9 fL Final    Immature  Granulocytes 02/06/2024 0.3  0.0 - 0.5 % Final    Gran # (ANC) 02/06/2024 6.4  1.8 - 7.7 K/uL Final    Immature Grans (Abs) 02/06/2024 0.03  0.00 - 0.04 K/uL Final    Lymph # 02/06/2024 1.4  1.0 - 4.8 K/uL Final    Mono # 02/06/2024 0.7  0.3 - 1.0 K/uL Final    Eos # 02/06/2024 0.1  0.0 - 0.5 K/uL Final    Baso # 02/06/2024 0.03  0.00 - 0.20 K/uL Final    nRBC 02/06/2024 0  0 /100 WBC Final    Gran % 02/06/2024 74.4 (H)  38.0 - 73.0 % Final    Lymph % 02/06/2024 16.6 (L)  18.0 - 48.0 % Final    Mono % 02/06/2024 7.6  4.0 - 15.0 % Final    Eosinophil % 02/06/2024 0.8  0.0 - 8.0 % Final    Basophil % 02/06/2024 0.3  0.0 - 1.9 % Final    Differential Method 02/06/2024 Automated   Final    Hemoglobin A1C 02/06/2024 5.6  4.0 - 5.6 % Final    Estimated Avg Glucose 02/06/2024 114  68 - 131 mg/dL Final    Vitamin B-12 02/06/2024 181 (L)  210 - 950 pg/mL Final    Folate 02/06/2024 11.1  4.0 - 24.0 ng/mL Final    HIV 1/2 Ag/Ab 02/06/2024 Non-reactive  Non-reactive Final    RPR 02/06/2024 Non-reactive  Non-reactive Final           Imaging:                 Results for orders placed or performed during the hospital encounter of 03/11/24   MRI Brain Without Contrast    Narrative    EXAMINATION:  MRI BRAIN WITHOUT CONTRAST    CLINICAL HISTORY:  Memory loss;Parkinsonian syndrome; Other specified forms of tremor    TECHNIQUE:  Multiplanar multisequence MR imaging of the brain was performed without contrast.    COMPARISON:  CT scan of the head dated 02/09/2024.    FINDINGS:  The craniocervical junction is intact.  The sellar and parasellar structures are unremarkable.  The midline structures are intact.  The intracranial flow voids are within normal limits.    No acute diffusion-weighted signal abnormality is present.  There are T2/FLAIR signal hyperintensities within the periventricular and subcortical white matter.  There is abnormal T2/FLAIR signal within the medial thalami, midbrain, and the central j luis.  There is focus of GRE  signal dropout within the left aspect of the midbrain and the central j luis.  There are no extra-axial fluid collections.  There is no evidence of mass effect.    There are postoperative changes in the globes.  The paranasal sinuses are unremarkable.  The mastoid air cells are clear.  The calvarium is intact.      Impression    Abnormal T2/FLAIR signal hyperintensity within the medial thalami, midbrain, and the j luis.  The findings remain nonspecific.    GRE susceptibility within the left aspect of midbrain and the central j luis, suggestive of prior hemosiderin deposition.    Changes of chronic vessel ischemic disease and cerebral volume loss.      Electronically signed by: Erick Suh MD  Date:    03/11/2024  Time:    11:28   Results for orders placed or performed during the hospital encounter of 02/09/24   CT Head Without Contrast    Narrative    EXAMINATION:  CT HEAD WITHOUT CONTRAST    CLINICAL HISTORY:  Memory loss; Other amnesia    TECHNIQUE:  Low dose axial images were obtained through the head.  Coronal and sagittal reformations were also performed. Contrast was not administered.    COMPARISON:  None.    FINDINGS:  There is mild to moderate volume loss that is diffuse in nature and not particularly focal.    No hydrocephalus mass effect intracranial hemorrhage or acute territorial infarct.    Decreased attenuation within the periventricular and subcortical white matter is nonspecific but may reflect mild chronic small vessel ischemic change without evidence of prior cortical or territorial infarct.    Atherosclerotic calcifications are noted at the skull base.    The calvarium is intact the visualized sinuses and mastoid air cells are clear.      Impression    No acute intracranial process.    There is mild to moderate volume loss that is diffuse in nature.    Relatively mild chronic small vessel ischemic changes are noted without evidence of prior cortical/territorial infarct.      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 Will discuss this at follow up. I did not review this with them today as felt could be distracting to mention before she had cognitive testing. She does not endorse features suggestive of DLB at this time. Will continue to watch.  I will review option for conservative l-dopa trial at our next appt.     Recommend hearing evaluation.   Recommend Life Alert type of product.           ..Total time: 68 minutes spent on the encounter, which includes face to face time and non-face to face time preparing to see the patient (eg, review of tests), Obtaining and/or reviewing separately obtained history, Documenting clinical information in the electronic or other health record, Independently interpreting results (not separately reported) and communicating results to the patient/family/caregiver, or Care coordination (not separately reported).   Xin Castillo, NICOLE, NP-C  Division of Movement and Memory Disorders  [facility]  [phone]

## 2024-04-18 ENCOUNTER — OUTPATIENT CASE MANAGEMENT (OUTPATIENT)
Dept: NEUROLOGY | Facility: CLINIC | Age: 84
End: 2024-04-18
Payer: MEDICARE

## 2024-04-18 ENCOUNTER — OFFICE VISIT (OUTPATIENT)
Dept: NEUROLOGY | Facility: CLINIC | Age: 84
End: 2024-04-18
Payer: MEDICARE

## 2024-04-18 VITALS
BODY MASS INDEX: 19.33 KG/M2 | HEART RATE: 90 BPM | SYSTOLIC BLOOD PRESSURE: 162 MMHG | WEIGHT: 99 LBS | DIASTOLIC BLOOD PRESSURE: 85 MMHG

## 2024-04-18 DIAGNOSIS — G30.1 MODERATE LATE ONSET ALZHEIMER'S DEMENTIA WITH ANXIETY: Primary | ICD-10-CM

## 2024-04-18 DIAGNOSIS — R41.3 MEMORY LOSS: ICD-10-CM

## 2024-04-18 DIAGNOSIS — F02.B4 MODERATE LATE ONSET ALZHEIMER'S DEMENTIA WITH ANXIETY: Primary | ICD-10-CM

## 2024-04-18 DIAGNOSIS — R26.81 GAIT INSTABILITY: ICD-10-CM

## 2024-04-18 PROCEDURE — 99203 OFFICE O/P NEW LOW 30 MIN: CPT | Mod: S$PBB,,, | Performed by: STUDENT IN AN ORGANIZED HEALTH CARE EDUCATION/TRAINING PROGRAM

## 2024-04-18 PROCEDURE — 99213 OFFICE O/P EST LOW 20 MIN: CPT | Mod: PBBFAC

## 2024-04-18 PROCEDURE — 96139 PSYCL/NRPSYC TST TECH EA: CPT | Mod: ,,, | Performed by: PSYCHIATRY & NEUROLOGY

## 2024-04-18 PROCEDURE — 96138 PSYCL/NRPSYC TECH 1ST: CPT | Mod: ,,, | Performed by: PSYCHIATRY & NEUROLOGY

## 2024-04-18 PROCEDURE — 96116 NUBHVL XM PHYS/QHP 1ST HR: CPT | Mod: S$PBB,,, | Performed by: PSYCHIATRY & NEUROLOGY

## 2024-04-18 PROCEDURE — 96132 NRPSYC TST EVAL PHYS/QHP 1ST: CPT | Mod: ,,, | Performed by: PSYCHIATRY & NEUROLOGY

## 2024-04-18 PROCEDURE — 99497 ADVNCD CARE PLAN 30 MIN: CPT | Mod: PBBFAC | Performed by: STUDENT IN AN ORGANIZED HEALTH CARE EDUCATION/TRAINING PROGRAM

## 2024-04-18 PROCEDURE — 99499 UNLISTED E&M SERVICE: CPT | Mod: S$PBB,,, | Performed by: PSYCHIATRY & NEUROLOGY

## 2024-04-18 PROCEDURE — 96116 NUBHVL XM PHYS/QHP 1ST HR: CPT | Mod: PBBFAC | Performed by: PSYCHIATRY & NEUROLOGY

## 2024-04-18 PROCEDURE — 99497 ADVNCD CARE PLAN 30 MIN: CPT | Mod: S$PBB,,, | Performed by: STUDENT IN AN ORGANIZED HEALTH CARE EDUCATION/TRAINING PROGRAM

## 2024-04-18 PROCEDURE — 96133 NRPSYC TST EVAL PHYS/QHP EA: CPT | Mod: ,,, | Performed by: PSYCHIATRY & NEUROLOGY

## 2024-04-18 PROCEDURE — 99215 OFFICE O/P EST HI 40 MIN: CPT | Mod: S$PBB,,, | Performed by: NURSE PRACTITIONER

## 2024-04-18 PROCEDURE — 99999 PR PBB SHADOW E&M-EST. PATIENT-LVL III: CPT | Mod: PBBFAC,,,

## 2024-04-18 NOTE — PROGRESS NOTES
Palliative and Geriatric Medicine  [facility]      Patient Name: Trudy Horvath     Date: 04/18/2024      Primary Care Physician:  Maria C Guzman A.M., MD    Consult Requested By:  Myla Cadena    Reason for Consult: Advance care planning and symptom management in the setting of Cognitive Impairment    Providers:  Michelle Cat PsyD (Neuropsychology); Xin Castillo DNP (Neurology); Zuleyma Olguin MD (Palliative Medicine)    Assessment/Plan     Plan/Recommendations:  Diagnoses and all orders for this visit:    Gait instability  -     Ambulatory referral/consult to Home Health; Future    Memory loss  -     Ambulatory referral/consult to Adult Neuropsychology  -     Ambulatory referral/consult to Home Health; Future          Mentation: Cognition and Mood   Memory loss  -Seen by Neurology and Neuropsychology   -Undergoing Neuropsych testing today  -FAST 4   -Daughter is managing her finances and supervising meds, she is visiting almost daily  -Plans for dtr to continue to be primary caregiver and for them to move in together   - Recommend the initiation of difficult conversations regarding future medical decisions while the patient retains the capacity to participate.  - Scheduled a follow-up visit to further discuss future medical decisions.    Mobility  Gait instability  -No recent falls  -No assistive device  -Discussed OP PT vs HH, dtr prefers HH. Will order HH for PT       Medications  -Medication reconciliation performed       Multi-Complexity:  -Frailty yes  -BMI: Body mass index is 19.33 kg/m².   -Weight loss: yes, progressive wt loss from 104 to 94, has gained 4lbs back - today 98lbs  -discussed sitting with her to eat and supervising meds         Palliative Care Encounter / Advance Care Planning      Advance Care Planning     Advance Directives:   Living Will: No    [place]POST: No    Do Not Resuscitate Status: No    Medical Power of : Yes    Agent's Name:  Lisa Milligan (dtr)   Agent's  Contact Number:  [phone]    Decision Making:  Patient answered questions and Family answered questions  Goals of Care: The patient and family endorses that what is most important right now is to focus on spending time at home and remaining as independent as possible  Accordingly, we have decided that the best plan to meet the patient's goals includes continuing with treatment    Date: 2024  I initiated the process of voluntary advance care planning today and explained the importance of this process to the patient. The patient received detailed information about the importance of designating a Health Care Power of  (HCPOA). Patient's daughter, Henrietta, is actively involved in her care and has a general power of , but it is unclear if it includes healthcare decisions. Henrietta is designated as the decision-maker.  After our discussion, the patient has decided to complete a HCPOA and has appointed her daughter, health care agent: Lisa Milligan & health care agent number: [phone]   She was also instructed to communicate with this person about their wishes for future healthcare, should she become sick and lose decision-making capacity.  They named her son-in-law Todd Milligan as backup agent. Daughter shared that they haven't discussed pt's end-of-life wishes and that she has told them what she wants for her  but they ahven't discussed code status. Daughter shared hat pt values her independence however the long term plan will be for them to live together.                Subjective:     Informant: Patient and daughter Henrietta     Chief Complaint: No chief complaint on file.      History of Present Illness:  Trudy Horvath is a 83 y.o. female presenting with cognitive impairment.      Referred to Geriatric and Palliative Care for evaluation and management of advance care planning, and additional support..     The patient lives alone and has been experiencing memory issues. Her daughter, Henrietta, has  been assisting with her care and managing her medications. The patient has a history of constipation and takes stool softeners off and on. She has had weight loss, dropping from 104 lbs to 92 lbs, but recently gained 2-3 lbs. The patient prefers candy and cake over regular meals, which may be due to feeling lonely when eating alone.  She denies any falls or injuries and has not required any hospitalizations. The patient's daughter is planning to move in with her mother to provide more consistent care and support.  Daughter assists with grocery shopping, frequently helps with medications and visits.  Difficulty remembering to take medications. Memory issues and repetition. No falls reported. Struggles with balance.    Patient denies shortness of breath, nausea, and any recent falls or injuries.    Palliative Exam     Review of Symptoms      Symptom Assessment (ESAS 0-10 Scale)  Pain:  0  Dyspnea:  0  Anxiety:  0  Nausea:  0  Depression:  0  Anorexia:  0  Fatigue:  0  Insomnia:  0  Restlessness:  0  Agitation:  0     CAM / Delirium:  Negative  Constipation:  Positive      Performance Status:  80    Functional Assessment Scale (FAST):  4    Living Arrangements:  Lives alone    Psychosocial/Cultural:   See Palliative Psychosocial Note: No  , lives alone, has 1 daughter that checks on her daily. Son-in-law also involved in her care   **Primary  to Follow**  Palliative Care  Consult: Yes    Spiritual:  F - Ailyn and Belief:  Caodaism        Geriatric Evaluation   4Ms for Medical Decision-Making in Older Adults  Last Completed EAWV: None    MENTATION:   Depression Patient Health Questionnaire:      2/6/2024     8:50 AM   Depression Patient Health Questionnaire   Over the last two weeks how often have you been bothered by little interest or pleasure in doing things Not at all   Over the last two weeks how often have you been bothered by feeling down, depressed or hopeless Several days    PHQ-2 Total Score 1     Has Dementia Dx: No    MEDICATIONS:  High Risk Medications:  Total Active Medications: 0  This patient does not have an active medication from one of the medication groupers.    MOBILITY:    Falls:   Fear of falling, balance/trouble walking? yes    Previous falls? no    Mobility Device: none    Vision:   Recent vision change? no   Baseline corrective lenses? yes    Hearing:   Hearing loss? yes   Hearing aids? no    Caregiver:  Patient has someone to help him/her-paid/unpaid?yes  Primary Caregiver: Daughter Henrietta          Past Medical History:   Diagnosis Date    Hyperlipidemia     Hypertension     Increased glucose level     risk of diabetes    Macular degeneration (senile) of retina, unspecified     Osteoporosis, post-menopausal        Review of patient's allergies indicates:  No Known Allergies    Medications:    Current Outpatient Medications:     amLODIPine (NORVASC) 10 MG tablet, Take 1 tablet (10 mg total) by mouth once daily., Disp: 90 tablet, Rfl: 3    aspirin 81 MG Chew, Take 1 tablet (81 mg total) by mouth once daily., Disp: 90 tablet, Rfl: 3    carvediloL (COREG) 3.125 MG tablet, Take 1 tablet (3.125 mg total) by mouth 2 (two) times daily., Disp: 180 tablet, Rfl: 3    cholecalciferol, vitamin D3, (VITAMIN D3) 50 mcg (2,000 unit) Cap, Take 1 capsule by mouth once daily., Disp: , Rfl:     cyanocobalamin (VITAMIN B-12) 2000 MCG tablet, Take 1 tablet (2,000 mcg total) by mouth once daily., Disp: 90 tablet, Rfl: 1    denosumab (PROLIA) 60 mg/mL Syrg, Inject 1 mL (60 mg total) into the skin every 6 (six) months., Disp: 2 mL, Rfl: 3    docosahexanoic acid/vit C/lut (EYE HEALTH ORAL), Take 1 tablet by mouth once daily., Disp: , Rfl:     irbesartan-hydrochlorothiazide (AVALIDE) 300-12.5 mg per tablet, TAKE 1 TABLET BY MOUTH EVERY DAY, Disp: 90 tablet, Rfl: 2    ISTALOL 0.5 % DrpD, , Disp: , Rfl:     potassium chloride SA (K-DUR,KLOR-CON) 20 MEQ tablet, Take 1 tablet (20 mEq total) by mouth  2 (two) times daily., Disp: 180 tablet, Rfl: 3    rosuvastatin (CRESTOR) 20 MG tablet, Take 1 tablet (20 mg total) by mouth once daily., Disp: 90 tablet, Rfl: 3    tetrahydrozoline HCl/peg (EYE DROPS OPHT), Apply 1 tablet to eye once daily. (Patient not taking: Reported on 4/18/2024), Disp: , Rfl:          Objective:   Physical Exam:  Vitals: Pulse: 90 (04/18/24 0822)  BP: (!) 162/85 (04/18/24 0822)  Physical Exam  Constitutional:       General: She is not in acute distress.     Appearance: She is underweight.   HENT:      Head: Atraumatic.   Pulmonary:      Effort: Pulmonary effort is normal. No respiratory distress.   Musculoskeletal:      Comments: Gait instability noted   Neurological:      General: No focal deficit present.      Mental Status: She is alert. Mental status is at baseline.   Psychiatric:         Mood and Affect: Mood and affect normal.               I spent a total of 31 minutes on the day of the visit. This includes face to face time in discussion of  symptom assessment, coordination of care and emotional support.  This also includes non-face to face time preparing to see the patient (eg, review of tests/imaging), obtaining and/or reviewing separately obtained history, documenting clinical information in the electronic or other health record, independently interpreting results and communicating results to the patient/family/caregiver, or care coordinator. Discussing with [facility] multidisciplinary team that includes, Neuropsychology, neurology, LCSW and .     Additional 17 minutes spent on advance care planning, goals of care discussion, emotional support, formulating and communicating prognosis and goals of care, exploring burden/benefit of various approaches of treatment. This discussion was voluntary with the consent of the patient and/or family.      This note was generated with the assistance of ambient listening technology. Verbal consent was obtained by the patient  and accompanying visitor(s) for the recording of patient appointment to facilitate this note. I attest to having reviewed and edited the generated note for accuracy, though some syntax or spelling errors may persist. Please contact the author of this note for any clarification.      Follow up:    Signature: Zuleyma Olguin MD

## 2024-04-18 NOTE — PROGRESS NOTES
Social Work - Dementia Care Management:    Brief introduction with caregiver, daughter Henrietta. Scheduled phone consultation for 4/22/24, 2:00 PM.

## 2024-04-19 DIAGNOSIS — I10 ESSENTIAL HYPERTENSION, BENIGN: ICD-10-CM

## 2024-04-19 NOTE — TELEPHONE ENCOUNTER
No care due was identified.  [facility] Embedded Care Due Messages. Reference number: [id number].   4/19/2024 5:55:22 AM CDT

## 2024-04-20 NOTE — TELEPHONE ENCOUNTER
Refill Routing Note   Medication(s) are not appropriate for processing by [facility] for the following reason(s):        Required vitals abnormal    [facility] action(s):  Defer             Appointments  past 12m or future 3m with PCP    Date Provider   Last Visit   3/6/2024 Maria C Guzman A.M., MD   Next Visit   4/30/2024 Maria C Guzman A.M., MD   ED visits in past 90 days: 0        Note composed:7:26 PM 04/19/2024

## 2024-04-21 RX ORDER — IRBESARTAN AND HYDROCHLOROTHIAZIDE 300; 12.5 MG/1; MG/1
TABLET, FILM COATED ORAL
Qty: 90 TABLET | Refills: 2 | Status: SHIPPED | OUTPATIENT
Start: 2024-04-21 | End: 2024-06-10 | Stop reason: SDUPTHER

## 2024-04-22 ENCOUNTER — OUTPATIENT CASE MANAGEMENT (OUTPATIENT)
Dept: NEUROLOGY | Facility: CLINIC | Age: 84
End: 2024-04-22
Payer: MEDICARE

## 2024-04-22 NOTE — PROGRESS NOTES
"Social Work - Dementia Care Management:    Phone consultation with caregiver, daughter Henrietta. She reported the following:  Patient lives alone at her own home  Pt's daughter Henrietta, who is pt's only child, spends most days with pt, is there for her bath; Henrietta and her  care for pt, manage appts, get groceries and provide meals, manage bills;  fixes things at house; etc; dtr and son-in-law live in [place]  Dtr's understanding is that pt is not yet dx'd with dementia, and that she will get dx information on FB appt on 5/16/24  Dtr has strain with one of her adult daughters, which is not only creating distress for her, but which pt also has some awareness of due in part to dtr answering pt's questions about the situation (e.g. pt asks why she hasn't been seeing her great-grandchild very often, dtr says "we don't see them")  Dtr waiting for home PT to start  Pt cannot keep track of meds; pt does not want to cook; likes lots of sweets; fixates on things - wanting to go out for [employer], washing clothes, calls dtr a bunch of times the day before a doctor's appt; use to keep things very clean, now lets messes accumulate; blames dtr for things such as the a/c not working when pt cannot manage the controls  Dtr tries to get pt out of the house,   Activities/engagement:  pt sits in chair and watches tv a lot; talks to a friend on the phone; fmly takes pt out for lunch a couple days/week; pt is alone one day/week for dtr and Barbara to take a break  Dtr's hope is [place]    Intervention/Plan:  Provided psycho-education, support, strategies  Encouraged dtr to provide pt with information/answers that will help decrease distress, worry, anxiety; and to not give much advance notice of appointments if pt ends up becoming fixated, anxious, etc about appts  Will email resources/information to dtr following FB visit on 5/16/24, to assure dtr " has discussed dx with providers.    S

## 2024-04-23 ENCOUNTER — LAB VISIT (OUTPATIENT)
Dept: LAB | Facility: HOSPITAL | Age: 84
End: 2024-04-23
Attending: FAMILY MEDICINE
Payer: MEDICARE

## 2024-04-23 DIAGNOSIS — E53.8 B12 DEFICIENCY: ICD-10-CM

## 2024-04-23 DIAGNOSIS — N18.31 STAGE 3A CHRONIC KIDNEY DISEASE: ICD-10-CM

## 2024-04-23 PROBLEM — G30.1 MILD LATE ONSET ALZHEIMER'S DEMENTIA WITH ANXIETY: Status: ACTIVE | Noted: 2024-02-06

## 2024-04-23 PROBLEM — F02.A4 MILD LATE ONSET ALZHEIMER'S DEMENTIA WITH ANXIETY: Status: ACTIVE | Noted: 2024-02-06

## 2024-04-23 LAB
ALBUMIN SERPL BCP-MCNC: 3.4 G/DL (ref 3.5–5.2)
ANION GAP SERPL CALC-SCNC: 8 MMOL/L (ref 8–16)
BUN SERPL-MCNC: 29 MG/DL (ref 8–23)
CALCIUM SERPL-MCNC: 9.8 MG/DL (ref 8.7–10.5)
CHLORIDE SERPL-SCNC: 107 MMOL/L (ref 95–110)
CO2 SERPL-SCNC: 24 MMOL/L (ref 23–29)
CREAT SERPL-MCNC: 1.3 MG/DL (ref 0.5–1.4)
EST. GFR  (NO RACE VARIABLE): 40.3 ML/MIN/1.73 M^2
GLUCOSE SERPL-MCNC: 111 MG/DL (ref 70–110)
PHOSPHATE SERPL-MCNC: 4 MG/DL (ref 2.7–4.5)
POTASSIUM SERPL-SCNC: 4.4 MMOL/L (ref 3.5–5.1)
SODIUM SERPL-SCNC: 139 MMOL/L (ref 136–145)
VIT B12 SERPL-MCNC: 1256 PG/ML (ref 210–950)

## 2024-04-23 PROCEDURE — 82607 VITAMIN B-12: CPT | Performed by: FAMILY MEDICINE

## 2024-04-23 PROCEDURE — 80069 RENAL FUNCTION PANEL: CPT | Performed by: FAMILY MEDICINE

## 2024-04-23 PROCEDURE — 36415 COLL VENOUS BLD VENIPUNCTURE: CPT | Mod: PO | Performed by: FAMILY MEDICINE

## 2024-04-30 ENCOUNTER — OFFICE VISIT (OUTPATIENT)
Dept: FAMILY MEDICINE | Facility: CLINIC | Age: 84
End: 2024-04-30
Payer: MEDICARE

## 2024-04-30 VITALS
SYSTOLIC BLOOD PRESSURE: 130 MMHG | BODY MASS INDEX: 19.52 KG/M2 | HEART RATE: 72 BPM | OXYGEN SATURATION: 98 % | HEIGHT: 60 IN | WEIGHT: 99.44 LBS | DIASTOLIC BLOOD PRESSURE: 66 MMHG

## 2024-04-30 DIAGNOSIS — E87.6 HYPOKALEMIA: ICD-10-CM

## 2024-04-30 DIAGNOSIS — I12.9 HYPERTENSIVE KIDNEY DISEASE WITH STAGE 3B CHRONIC KIDNEY DISEASE: Primary | ICD-10-CM

## 2024-04-30 DIAGNOSIS — F02.A4 MILD LATE ONSET ALZHEIMER'S DEMENTIA WITH ANXIETY: ICD-10-CM

## 2024-04-30 DIAGNOSIS — E53.8 B12 DEFICIENCY: ICD-10-CM

## 2024-04-30 DIAGNOSIS — G30.1 MILD LATE ONSET ALZHEIMER'S DEMENTIA WITH ANXIETY: ICD-10-CM

## 2024-04-30 DIAGNOSIS — R73.01 ABNORMAL FASTING GLUCOSE: ICD-10-CM

## 2024-04-30 DIAGNOSIS — N18.32 HYPERTENSIVE KIDNEY DISEASE WITH STAGE 3B CHRONIC KIDNEY DISEASE: Primary | ICD-10-CM

## 2024-04-30 DIAGNOSIS — E78.49 OTHER HYPERLIPIDEMIA: ICD-10-CM

## 2024-04-30 PROBLEM — N18.31 STAGE 3A CHRONIC KIDNEY DISEASE: Status: RESOLVED | Noted: 2024-03-06 | Resolved: 2024-04-30

## 2024-04-30 PROCEDURE — 99999 PR PBB SHADOW E&M-EST. PATIENT-LVL IV: CPT | Mod: PBBFAC,,, | Performed by: FAMILY MEDICINE

## 2024-04-30 PROCEDURE — 99215 OFFICE O/P EST HI 40 MIN: CPT | Mod: S$PBB,,, | Performed by: FAMILY MEDICINE

## 2024-04-30 PROCEDURE — 99214 OFFICE O/P EST MOD 30 MIN: CPT | Mod: PBBFAC,PO | Performed by: FAMILY MEDICINE

## 2024-04-30 NOTE — PROGRESS NOTES
Subjective:       Patient ID: Trudy Horvath is a 83 y.o. female.    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 Is being followed by Neuropsych and Neurology for Alzheimer's dementia. Last visit with Neuropsychology on 4/18/24. Pt has gait instability and Parkinsonism for which she is also being evaluated by Neurology. Pt to start home PT.  Hypertension  This is a chronic problem. The current episode started more than 1 year ago. The problem is controlled. Pertinent negatives include no blurred vision, chest pain, headaches, orthopnea, palpitations, peripheral edema, PND or shortness of breath. Past treatments include calcium channel blockers, beta blockers, angiotensin blockers and diuretics. The current treatment provides significant improvement. There are no compliance problems.  Hypertensive end-organ damage includes kidney disease.   Hyperlipidemia  Pertinent negatives include no chest pain or shortness of breath.   Tremor has improved per daughter. Has tremor with anxiety.  Results for orders placed or performed in visit on 04/23/24   Renal Function Panel   Result Value Ref Range    Glucose 111 (H) 70 - 110 mg/dL    Sodium 139 136 - 145 mmol/L    Potassium 4.4 3.5 - 5.1 mmol/L    Chloride 107 95 - 110 mmol/L    CO2 24 23 - 29 mmol/L    BUN 29 (H) 8 - 23 mg/dL    Calcium 9.8 8.7 - 10.5 mg/dL    Creatinine 1.3 0.5 - 1.4 mg/dL    Albumin 3.4 (L) 3.5 - 5.2 g/dL    Phosphorus 4.0 2.7 - 4.5 mg/dL    eGFR 40.3 (A) >60 mL/min/1.73 m^2    Anion Gap 8 8 - 16 mmol/L   Vitamin B12   Result Value Ref Range    Vitamin B-12 1256 (H) 210 - 950 pg/mL      Review of Systems   Constitutional:  Negative for appetite change, chills and fever.   Eyes:  Negative for blurred vision.   Respiratory:  Negative for shortness of breath.    Cardiovascular:  Negative for chest pain,  palpitations, orthopnea, leg swelling and PND.   Gastrointestinal:  Negative for abdominal pain, anal bleeding, blood in stool, constipation, diarrhea, nausea and vomiting.   Genitourinary:  Negative for dysuria and hematuria.        Denies urinary leakage.   Neurological:  Negative for headaches.       Objective:      Physical Exam  Vitals reviewed.   Constitutional:       General: She is not in acute distress.     Appearance: Normal appearance. She is not ill-appearing.   HENT:      Head: Normocephalic and atraumatic.      Right Ear: External ear normal.      Left Ear: External ear normal.   Eyes:      General: No scleral icterus.        Right eye: No discharge.         Left eye: No discharge.      Extraocular Movements: Extraocular movements intact.      Conjunctiva/sclera: Conjunctivae normal.   Neck:      Vascular: No carotid bruit.   Cardiovascular:      Rate and Rhythm: Normal rate and regular rhythm.      Heart sounds: Murmur heard.   Pulmonary:      Effort: Pulmonary effort is normal.      Breath sounds: Normal breath sounds. No wheezing or rales.   Abdominal:      General: Bowel sounds are normal.      Palpations: Abdomen is soft. There is no mass.      Tenderness: There is no abdominal tenderness.   Musculoskeletal:      Cervical back: Normal range of motion and neck supple. No rigidity or tenderness.      Right lower leg: No edema.      Left lower leg: No edema.   Skin:     General: Skin is warm and dry.   Neurological:      Mental Status: She is alert. Mental status is at baseline.   Psychiatric:         Mood and Affect: Mood normal.         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   -     Hemoglobin A1C; Future; Expected date: 07/30/2024    Hypokalemia: Resolved    Other hyperlipidemia: Stable  -     Lipid Panel; Future; Expected date: 07/30/2024       Pt's daughter advised to take pt to her local pharmacy for the RSV and Shingles vaccines.    F/U in 4 months.

## 2024-05-15 NOTE — PROGRESS NOTES
Name: Trudy Horvath  MRN: [id number]   CSN: [id number]      Date: 5-16-24      Referring physician:  No referring provider defined for this encounter.    Subjective:      Chief Complaint: Follow up     History of Present Illness (HPI):    Trudy Horvath is a 83 y.o. 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 She did not endorse features cw DLB. Dr. Hernandes ordered HH PT at last clinic. Our SW has also been following. Hearing eval and life alert type of product was recommended.       Little shaking in hands. Not really sure when it happens. My daddy's hand and knee would always shake.   It does not bother me. Does not interfere with activities.     No issues walking. No falls. Walk all over mall.     When asked about stiffness- I dont know if my muscles feel stiff.     Denies slowness.             Review of Systems   Neurological:  Positive for tremors.   Psychiatric/Behavioral:  Negative for sleep disturbance. The patient is nervous/anxious.        Past Medical History: The patient  has a past medical history of Hyperlipidemia, Hypertension, Increased glucose level, Macular degeneration (senile) of retina, unspecified, and Osteoporosis, post-menopausal.    Social History: The patient  reports that she has never smoked. She has never been exposed to tobacco smoke. She has never used smokeless tobacco. She reports that she does not drink alcohol and does not use drugs.    Family History: Their family history includes Heart disease in her mother; Stroke in her father.    Allergies: Patient has no known allergies.     Meds:   Current Outpatient Medications on File Prior to Visit   Medication Sig Dispense Refill    amLODIPine (NORVASC) 10 MG tablet Take 1 tablet (10 mg total) by mouth once daily. 90  tablet 3    aspirin 81 MG Chew Take 1 tablet (81 mg total) by mouth once daily. 90 tablet 3    carvediloL (COREG) 3.125 MG tablet Take 1 tablet (3.125 mg total) by mouth 2 (two) times daily. 180 tablet 3    cholecalciferol, vitamin D3, (VITAMIN D3) 50 mcg (2,000 unit) Cap Take 1 capsule by mouth once daily.      docosahexanoic acid/vit C/lut (EYE HEALTH ORAL) Take 1 tablet by mouth once daily.      irbesartan-hydrochlorothiazide (AVALIDE) 300-12.5 mg per tablet TAKE 1 TABLET BY MOUTH EVERY DAY 90 tablet 2    ISTALOL 0.5 % DrpD       potassium chloride SA (K-DUR,KLOR-CON) 20 MEQ tablet Take 1 tablet (20 mEq total) by mouth 2 (two) times daily. 180 tablet 3    rosuvastatin (CRESTOR) 20 MG tablet Take 1 tablet (20 mg total) by mouth once daily. 90 tablet 3    tetrahydrozoline HCl/peg (EYE DROPS OPHT) Apply 1 tablet to eye once daily.      denosumab (PROLIA) 60 mg/mL Syrg Inject 1 mL (60 mg total) into the skin every 6 (six) months. 2 mL 3     No current facility-administered medications on file prior to visit.       Objective:     Physical Exam:    Vitals:    05/16/24 1429   BP: 137/66   Pulse: (P) 86   Weight: 46.2 kg (101 lb 11.9 oz)   Height: 5' (1.524 m)     Body mass index is 19.87 kg/m².    Constitutional  Well-developed, well-nourished, thin appears stated age  Appropriately dressed and groomed    Cardiovascular  no LE edema bilaterally       ..Awake, alert, pleasant and cooperative. Seems a bit irritated with repeat PE.  Repetitive (particularly repeating she is fine and cleans her own house). Exasperated with dtr when she tells her she cleans for her.    RR equal and unlabored.   Face symmetric.   EOMI.  Hearing intact to conversation.   Gait normal and steady.    ..  * Specialized movement exam  No hypophonic speech.    No facial masking      Really struggles with relaxing and understanding what is being asked  Neck-0  RUE- 1 on diversion only  RLE- 1 on diversion only  LUE & LLE-  query 1 on diversion  only       SKYELR  She does have difficulty LH but note is made of pulling in palm. .  But also has clumsiness LTT    Slight LH rest tremor, increases to near mild on diversion. Slight rest tremor R index finger only on diversion.   Subtle postural tremor LH.   Slight intention tremor BH (L>R)  No kinetic tremor.   No voice tremor.   No head tremor.        Arises slightly slowed without push.  No other dystonia, chorea, athetosis, myoclonus, or tics.  Slightly broad based.  Wearing slight heel.   Short step.   Bit unsetedy with turns.               Laboratory Results:  Lab Visit on 04/30/2024   Component Date Value Ref Range Status    Microalbumin, Urine 04/30/2024 13.0  ug/mL Final    Creatinine, Urine 04/30/2024 57.0  15.0 - 325.0 mg/dL Final    Microalb/Creat Ratio 04/30/2024 22.8  0.0 - 30.0 ug/mg Final   Lab Visit on 04/23/2024   Component Date Value Ref Range Status    Glucose 04/23/2024 111 (H)  70 - 110 mg/dL Final    Sodium 04/23/2024 139  136 - 145 mmol/L Final    Potassium 04/23/2024 4.4  3.5 - 5.1 mmol/L Final    Chloride 04/23/2024 107  95 - 110 mmol/L Final    CO2 04/23/2024 24  23 - 29 mmol/L Final    BUN 04/23/2024 29 (H)  8 - 23 mg/dL Final    Calcium 04/23/2024 9.8  8.7 - 10.5 mg/dL Final    Creatinine 04/23/2024 1.3  0.5 - 1.4 mg/dL Final    Albumin 04/23/2024 3.4 (L)  3.5 - 5.2 g/dL Final    Phosphorus 04/23/2024 4.0  2.7 - 4.5 mg/dL Final    eGFR 04/23/2024 40.3 (A)  >60 mL/min/1.73 m^2 Final    Anion Gap 04/23/2024 8  8 - 16 mmol/L Final    Vitamin B-12 04/23/2024 1256 (H)  210 - 950 pg/mL Final   Lab Visit on 02/20/2024   Component Date Value Ref Range Status    Thiamine 02/20/2024 63  38 - 122 ug/L Final    GAUDENCIO Screen 02/20/2024 Negative <1:80  Negative <1:80 Final    TSH 02/20/2024 2.478  0.400 - 4.000 uIU/mL Final           Imaging:                     Results for orders placed or performed during the hospital encounter of 03/11/24   MRI Brain Without Contrast    Narrative    EXAMINATION:  MRI  BRAIN WITHOUT CONTRAST    CLINICAL HISTORY:  Memory loss;Parkinsonian syndrome; Other specified forms of tremor    TECHNIQUE:  Multiplanar multisequence MR imaging of the brain was performed without contrast.    COMPARISON:  CT scan of the head dated 02/09/2024.    FINDINGS:  The craniocervical junction is intact.  The sellar and parasellar structures are unremarkable.  The midline structures are intact.  The intracranial flow voids are within normal limits.    No acute diffusion-weighted signal abnormality is present.  There are T2/FLAIR signal hyperintensities within the periventricular and subcortical white matter.  There is abnormal T2/FLAIR signal within the medial thalami, midbrain, and the central j luis.  There is focus of GRE signal dropout within the left aspect of the midbrain and the central j luis.  There are no extra-axial fluid collections.  There is no evidence of mass effect.    There are postoperative changes in the globes.  The paranasal sinuses are unremarkable.  The mastoid air cells are clear.  The calvarium is intact.      Impression    Abnormal T2/FLAIR signal hyperintensity within the medial thalami, midbrain, and the j luis.  The findings remain nonspecific.    GRE susceptibility within the left aspect of midbrain and the central j luis, suggestive of prior hemosiderin deposition.    Changes of chronic vessel ischemic disease and cerebral volume loss.      Electronically signed by: Erick Suh MD  Date:    03/11/2024  Time:    11:28   Results for orders placed or performed during the hospital encounter of 02/09/24   CT Head Without Contrast    Narrative    EXAMINATION:  CT HEAD WITHOUT CONTRAST    CLINICAL HISTORY:  Memory loss; Other amnesia    TECHNIQUE:  Low dose axial images were obtained through the head.  Coronal and sagittal reformations were also performed. Contrast was not administered.    COMPARISON:  None.    FINDINGS:  There is mild to moderate volume loss that is diffuse in nature and  not particularly focal.    No hydrocephalus mass effect intracranial hemorrhage or acute territorial infarct.    Decreased attenuation within the periventricular and subcortical white matter is nonspecific but may reflect mild chronic small vessel ischemic change without evidence of prior cortical or territorial infarct.    Atherosclerotic calcifications are noted at the skull base.    The calvarium is intact the visualized sinuses and mastoid air cells are clear.      Impression    No acute intracranial process.    There is mild to moderate volume loss that is diffuse in nature.    Relatively mild chronic small vessel ischemic changes are noted without evidence of prior cortical/territorial infarct.      Electronically signed by: Wilmer Brito  Date:    02/09/2024  Time:    09:27           Assessment and Plan     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    Resting tremor        Medical Decision Making:    Parkinsonian features. Will monitor. LH is slowed but note was made of tightening in palm, looks cw Duputytren's contracture but no evidence of pulling of fingers. She is not bothered by this. She and dtr had never noted the tightness in left palm. It does not interfere with functioning. Suspect this is what is causing or at least contributing to slowness in  LH. However, she does have slowing LTT. She has rest tremor BH, again not bothered by this. She and dtr feel tremor improved with B12 supplementation.   She does have perhaps some subtle coghweel on diversion but again has diff with directions. Will continue to monitor. Discussed this with them. No treatment recommended at this point.       Not conadidate for lecanemab.   Discussed options for oral memory meds. Discussed goals of treatment/rationale aas well as SE.   They will consider.   If decide to proceed, I will order EKG first.     Discussed importance eating properly and staying hydrated.     She no longer drives. Gave up 2 years ago.   She  no longer cooks.     Dtr and son-in-law plan on staying with her for couple of days/nights per week.     Follow up 6 mos.     53 minutes     ..Total time: 53 minutes spent on the encounter, which includes face to face time and non-face to face time preparing to see the patient (eg, review of tests), Obtaining and/or reviewing separately obtained history, Documenting clinical information in the electronic or other health record, Independently interpreting results (not separately reported) and communicating results to the patient/family/caregiver, or Care coordination (not separately reported).             Xin Castillo, NICOLE, NP-C  Division of Movement and Memory Disorders  [facility]  [phone]

## 2024-05-16 ENCOUNTER — OFFICE VISIT (OUTPATIENT)
Dept: NEUROLOGY | Facility: CLINIC | Age: 84
End: 2024-05-16
Payer: MEDICARE

## 2024-05-16 VITALS
BODY MASS INDEX: 19.97 KG/M2 | HEIGHT: 60 IN | DIASTOLIC BLOOD PRESSURE: 66 MMHG | SYSTOLIC BLOOD PRESSURE: 137 MMHG | WEIGHT: 101.75 LBS

## 2024-05-16 DIAGNOSIS — F02.A4 MILD LATE ONSET ALZHEIMER'S DEMENTIA WITH ANXIETY: Primary | ICD-10-CM

## 2024-05-16 DIAGNOSIS — G30.1 MILD LATE ONSET ALZHEIMER'S DEMENTIA WITH ANXIETY: Primary | ICD-10-CM

## 2024-05-16 DIAGNOSIS — G25.2 RESTING TREMOR: ICD-10-CM

## 2024-05-16 PROCEDURE — 99214 OFFICE O/P EST MOD 30 MIN: CPT | Mod: S$PBB,,, | Performed by: STUDENT IN AN ORGANIZED HEALTH CARE EDUCATION/TRAINING PROGRAM

## 2024-05-16 PROCEDURE — 99499 UNLISTED E&M SERVICE: CPT | Mod: S$PBB,,, | Performed by: PSYCHIATRY & NEUROLOGY

## 2024-05-16 PROCEDURE — 99214 OFFICE O/P EST MOD 30 MIN: CPT | Mod: PBBFAC

## 2024-05-16 PROCEDURE — 99215 OFFICE O/P EST HI 40 MIN: CPT | Mod: S$PBB,,, | Performed by: NURSE PRACTITIONER

## 2024-05-16 PROCEDURE — 99999 PR PBB SHADOW E&M-EST. PATIENT-LVL IV: CPT | Mod: PBBFAC,,,

## 2024-05-16 NOTE — PROGRESS NOTES
NEUROPSYCHOLOGICAL EVALUATION FEEDBACK    Trudy Horvath attended a feedback session today accompanied by her daughter. Plan for her to move in with dtr and to have both move to the [place] where there are other family members who could help if needed. Repeat exam with Dr. Castillo less concerning for PD today. Will continue to monitor parkinsonism. Discussed medication options for AD. Following with Alfreda. Discussed not rationalizing with patient. We discussed the results of the neuropsychological evaluation (45 minutes).  All of her questions were answered.    1. 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        2. Resting tremor            PLAN:   RTC 6 months   Will wait on medications, Dr Castillo to plan for EKG if they are interested in pursuing meds at some point  Following with Alfreda Dickens LCSW for caregiver support    Michelle Cat PsyD  Licensed Clinical Neuropsychologist  [facility] - Department of Neurology

## 2024-05-16 NOTE — PATIENT INSTRUCTIONS
Summary of the evidence on modifiable risk factors for cognitive decline and dementia             From: Irwin, Wili Tamez, Dolores, Melissa & Donavan (2015). Summary of the evidence on modifiable risk factors for cognitive decline and dementia: A population-based perspective. Alzheimer's & Dementia, 11, 759-664.    *Studies suggest small or moderate alcohol consumption (no more than 1 drink per day) by older individuals may decrease the risk of cognitive decline and dementia. The evidence is not strong enough, however, to suggest those who do not drink should start drinking, especially when weighed against the potential negative effects of excessive alcohol consumption, such as an increased risk of falls among older adults. Research also generally suggests that red wine may be the best form of alcohol for cognitive functioning, in part, due to its antioxidant effects. All alcohol use is cautioned, though, as alcohol could cause harmful effects and interfere with medications. A physician and/or pharmacist should be consulted before alcohol is consumed.          Behaviors to Promote Brain Health       Exercise regularly - Exercise has many known benefits, and it appears that regular physical activity benefits the brain. Multiple research studies show that people who are physically active are less likely to experience a decline in their mental function and have a lower risk of developing Alzheimer's disease. We believe these benefits are a result of increased blood flow to your brain during exercise. It also tends to counter some of the natural reduction in brain connections that occur during aging, in effect reversing some of the problems. Aim to exercise several times per week for 30-60 minutes. You can walk, swim, play tennis or any other moderate aerobic activity that increases your heart rate.    Eat a Mediterranean diet - Your diet plays a large role in your brain health. Consider following a Mediterranean  diet, which emphasizes plant-based foods, whole grains, fish and healthy fats, such as olive oil. It incorporates much less red meat and salt than a typical American diet. Studies show people who closely follow a Mediterranean diet are less likely to have Alzheimer's disease than people who don't follow the diet. Omega fatty acids found in extra-virgin olive oil and other healthy fats are vital for your cells to function correctly, appear to decrease your risk of coronary artery disease, and increase mental focus and slow cognitive decline in older adults.       Stay mentally active - Your brain is similar to a muscle -- you need to use it or you lose it. There are many things that you can do to keep your brain in shape, such as doing crossword puzzles or Sudoku, reading, playing cards or putting together a jigsaw puzzle. Consider it cross-training your brain. So incorporate different activities to increase the effectiveness.     Stay socially involved - Social interaction helps lloyd off depression and stress, both of which can contribute to memory loss. Look for opportunities to connect with loved ones, friends and others, especially if you live alone. There is research that links solitary confinement to brain atrophy, so remaining socially active may have the opposite effect and strengthen the health of your brain.    Get plenty of sleep - Sleep plays an important role in your brain health. There are some theories that sleep helps clear abnormal proteins in your brain and consolidates memories, which boosts your overall memory and brain health. It is important that you try to get seven to eight consecutive hours of sleep per night, not fragmented sleep of two- or three-hour increments. Consecutive sleep gives your brain the time to consolidate and store your memories effectively.     The MIND Diet: A Detailed Guide for Beginners    The MIND diet is designed to prevent dementia and loss of brain function as you  age. It combines the Mediterranean diet and the DASH diet to create a dietary pattern that focuses specifically on brain health. This article is a detailed guide for beginners, with everything you need to know about the MIND diet and how to follow it.    What Is the MIND Diet?  MIND stands for the Mediterranean-DASH Intervention for Neurodegenerative Delay.    The MIND diet aims to reduce dementia and the decline in brain health that often occurs as people get older. It combines aspects of two very popular diets, the Mediterranean diet and the Dietary Approaches to Stop Hypertension (DASH) diet.    Many experts regard the Mediterranean and DASH diets as some of the healthiest. Research has shown they can lower blood pressure and reduce the risk of heart disease, diabetes and several other diseases. But researchers wanted to create a diet specifically to help improve brain function and prevent dementia. To do this, they combined foods from the Mediterranean and DASH diets that had been shown to benefit brain health.    For example, both the Mediterranean and DASH diets recommend eating a lot of fruit. Fruit intake has not been correlated with improved brain function, but eating berries has been. Thus, the MIND diet encourages its followers to eat berries, but does not emphasize consuming fruit in general.     Currently, there are no set guidelines for how to follow the MIND diet. Simply eat more of the 10 foods the diet encourages you to eat, and eat less of the five foods the diet recommends you limit.    10 Foods to Eat on the MIND Diet    Green, leafy vegetables: Aim for six or more servings per week. This includes kale, spinach, cooked greens and salads.  All other vegetables: Try to eat another vegetable in addition to the green leafy vegetables at least once a day. It is best to choose non-starchy vegetables because they have a lot of nutrients with a low number of calories.  Berries: Eat berries at least twice  a week. Although the published research only includes strawberries, you should also consume other berries like blueberries, raspberries and blackberries for their antioxidant benefits.  Nuts: Try to get five servings of nuts or more each week. The creators of the MIND diet dont specify what kind of nuts to consume, but it is probably best to vary the type of nuts you eat to obtain a variety of nutrients.  Olive oil: Use olive oil as your main cooking oil. Check out this article for information about the safety of cooking with olive oil.  Whole grains: Aim for at least three servings daily. Choose whole grains like oatmeal, quinoa, brown rice, whole-wheat pasta and 100% whole-wheat bread.  Fish: Eat fish at least once a week. It is best to choose fatty fish like salmon, sardines, trout, tuna and mackerel for their high amounts of omega-3 fatty acids.  Beans: Include beans in at least four meals every week. This includes all beans, lentils and soybeans.  Poultry: Try to eat chicken or turkey at least twice a week. Note that fried chicken is not encouraged on the MIND diet.  Wine: Aim for no more than one glass daily. Both red and white wine may benefit the brain. However, much research has focused on the red wine compound resveratrol, which may help protect against Alzheimers disease.    If you are unable to consume the targeted amount of servings, dont quit the MIND diet altogether. Research has shown that following the MIND diet even a moderate amount is associated with a reduced risk of Alzheimers disease. When youre following the diet, you can eat more than just these 10 foods. However, the more you stick to the diet, the better your results may be.    5 Foods to Avoid on the MIND Diet    Butter and margarine: Try to eat less than 1 tablespoon (about 14 grams) daily. Instead, try using olive oil as your primary cooking fat, and dipping your bread in olive oil with herbs.  Cheese: The MIND diet recommends  limiting your cheese consumption to less than once per week.  Red meat: Aim for no more than three servings each week. This includes all beef, pork, lamb and products made from these meats.  Fried food: The MIND diet highly discourages fried food, especially the kind from fast-food restaurants. Limit your consumption to less than once per week.  Pastries and sweets: This includes most of the processed junk food and desserts you can think of. Ice cream, cookies, brownies, snack cakes, donuts, candy and more. Try to limit these to no more than four times a week.    Researchers encourage limiting your consumption of these foods because they contain saturated fats and trans fats. Studies have found that trans fats are clearly associated with all sorts of diseases, including heart disease and even Alzheimers disease. However, the health effects of saturated fat are widely debated in the nutrition world. Although the research on saturated fats and heart disease may be inconclusive and highly contested, animal research and observational studies in humans do suggest that consuming saturated fats in excess is associated with poor brain health.      The MIND Diet May Decrease Oxidative Stress and Inflammation  The current research on the MIND diet has not been able to show exactly how it works. However, the scientists who created the diet think it may work by reducing oxidative stress and inflammation. Oxidative stress occurs when unstable molecules called free radicals accumulate in the body in large quantities. This often causes damage to cells. The brain is especially vulnerable to this type of damage. Inflammation is your bodys natural response to injury and infection. But if its not properly regulated, inflammation can also be harmful and contribute to many chronic diseases.    Together, oxidative stress and inflammation can be quite detrimental to the brain. In recent years, theyve been the focus of some  interventions to prevent and treat Alzheimers disease. Following the Mediterranean and DASH diets has been associated with lower levels of oxidative stress and inflammation. Because the MIND diet is a hybrid of these two diets, the foods that make up the MIND diet probably also have antioxidant and anti-inflammatory effects. The antioxidants in berries and the vitamin E in olive oil, green leafy vegetables and nuts are thought to benefit brain function by protecting the brain from oxidative stress. Additionally, the omega-3 fatty acids found in fatty fish are well-known for their ability to lower inflammation in the brain, and have been associated with slower loss of brain function.    The MIND Diet May Reduce Harmful Beta-Amyloid Proteins  Beta-amyloid proteins are protein fragments found naturally in the body. However, they can accumulate and form plaques that build up in the brain, disrupting communication between brain cells and eventually leading to brain cell death. In fact, many scientists believe these plaques are one of the primary causes of Alzheimers disease. Animal and test-tube studies suggest that the antioxidants and vitamins that many MIND diet foods contain may help prevent the formation of beta-amyloid plaques in the brain.    Additionally, the MIND diet limits foods that contain saturated fats and trans fats, which studies have shown can increase beta-amyloid protein levels in mices brains. Human observational studies have found that consuming these fats was associated with a doubled risk of Alzheimers disease. However, it is important to note that this type of research is not able to determine cause and effect. Higher-quality, controlled studies are needed to discover exactly how the MIND diet may benefit brain health.    A Sample Meal Plan for One Week  Making meals for the MIND diet doesnt have to be complicated. Center your meals around the 10 foods and food groups that are encouraged on  the diet, and try to stay away from the five foods that need to be limited.    Monday  Breakfast: Greek yogurt with raspberries, topped with sliced almonds.  Lunch: Mediterranean salad with olive-oil-based dressing, grilled chicken, whole-wheat jay.  Dinner: Burrito bowl with brown rice, black beans, fajita vegetables, grilled chicken, salsa and guacamole.    Tuesday  Breakfast: Wheat toast with almond butter, scrambled eggs.  Lunch: Grilled chicken sandwich, blackberries, carrots.  Dinner: Grilled salmon, side salad with olive-oil-based dressing, brown rice.    Wednesday  Breakfast: Steel-cut oatmeal with strawberries, hard-boiled eggs.  Lunch: Mexican-style salad with mixed greens, black beans, red onion, corn, grilled chicken and olive-oil-based dressing.  Dinner: Chicken and vegetable stir-parra, brown rice.    Thursday  Breakfast: Greek yogurt with peanut butter and banana.  Lunch: Baked trout, ppee greens, black-eyed peas.  Dinner: Whole-wheat spaghetti with turkey meatballs and marinara sauce, side salad with olive-oil-based dressing.    Friday  Breakfast: Wheat toast with avocado, omelet with peppers and onions.  Lunch: Chili made with ground turkey.  Dinner: Greek-seasoned baked chicken, oven-roasted potatoes, side salad, wheat dinner roll.    Saturday  Breakfast: Overnight oats with strawberries.  Lunch: Fish tacos on whole wheat tortillas, brown rice, jacobson beans.  Dinner: Chicken gyro on whole-wheat jay, cucumber and tomato salad.    Sunday  Breakfast: Spinach frittata, sliced apple and peanut butter.  Lunch: Tuna salad sandwich on wheat bread, plus carrots and celery with hummus.  Dinner: [place] chicken, brown rice, lentils.  You can drink a glass of wine with each dinner to satisfy the MIND diet recommendations. Nuts can also make a great snack.          Alzheimer's Disease   Alzheimer's disease is the most common form of dementia. It leads to changes in memory, thinking, and behavior. Alzheimer's  "disease is progressive, and eventually, these symptoms become severe enough to interfere with daily tasks. The hallmark symptom of Alzheimer's disease is trouble creating new memories, which makes it difficult to remember newly learned information. Other common problems include: changes to language, such as searching for words or substituting the wrong word; difficulty with multitasking or problem solving; feeling overwhelmed by decisions; getting lost; trouble identifying the date, time, or place; increasing irritability, particularly in the evenings; mood swings, depression or anxiety.     Alzheimer's disease is more common with age. Because it tends to onset much later in life, people with Alzheimer's disease typically live between four to eight years after diagnosis, although some people can live as long as 20 years depending on their age and other health factors. Symptoms do worsen over time, but the rate at which the disease progresses can vary greatly. Because Alzheimer's can onset so late in life, people often do not progress to the later stages of the disease.     Stages of Alzheimer's Disease     Early Stage: Alzheimer's disease is caused by brain changes (plaques and neurofibrillary tangles) that  think start as much as 20 years before symptoms become noticeable. In the beginning, or early stage, symptoms are typically mild and the person often still functions independently. They may continue to drive, work, and participate in other activities despite having memory lapses or occasionally forgetting familiar words.   Middle Stage: Middle stage Alzheimer's (also called "moderate") is typically the longest stage, and can last for many years. During this stage, a person's symptoms become more pronounced, and they start to need help with things like managing finances, medications, driving, or cooking. Their memory loss becomes denser, and the time they are able to recall things becomes shorter. They " may begin to forget elements of their personal history, such as the names of grandchildren or what college they attended. They may begin to get frustrated or angry, or act in unexpected ways (such as refusing to bathe). Sometimes people can begin to wander or become lost. They may feel iyer or withdrawn, particularly in socially or mentally challenging situations. They are often disoriented, and unable to recall the date. Eventually, they may not be able to consistently identify where they are. They may become suspicious of others, or demonstrate changes to personality. Some people will engage in repetitive behavior, such as hand wringing or tissue shredding. They often lose insight into their symptoms, and may begin to exhibit poor judgment (vulnerability to scams, not believing they need a walker and falling more frequently). Concern for unsafe behaviors increases during this stage (driving, leaving the stove on, poor medication compliance, declining hygiene, wandering). Often, people are no longer able to live alone, and require daily assistance. Typically, at this stage, people living with Alzheimer's can still participate in daily activities with daily assistance or oversight. For example, they may need help picking out clothes but can still dress themselves. They may need food to be prepared, but can feed themselves. They may be able to  after themselves, but would need help for more comprehensive housekeeping. As the need for more intensive care and oversight increases, caregivers may want to consider respite care or an adult day center so they can have a temporary break while the person living with Alzheimer's remains in a safe environment. Often, it is towards the end of the middle stage that caregivers begin to consider a higher level of care for their loved one. Risk for caregiver burnout in this stage increases, as their loved one's needs often begin to exceed what they are able to safely provide  at home.   Late Stage: Symptoms in the final stage are severe. People generally lose their ability to respond to the environment, carry on a conversation, and, eventually, to control movement. They may still say words or phrases, but communication is difficult. Significant personality change is often evident. People in the late stage require round the clock assistance and supervision. They are often unaware of their surroundings, and do not recognize family members or caregivers. Due to the extensive degeneration in their brain, they may begin to have physical problems with things like walking, sitting, and swallowing. They become more vulnerable to infections, such as pneumonia. At this point in the disease, the world is primarily experienced through the senses. Caregivers can express caring through touch, sound, sight, taste and smell. For example, try: Playing his or her favorite music; Reading portions of books that have meaning for the person; Looking at old photos together; Preparing a favorite food; Rubbing lotion with a favorite scent into the skin; Brushing the person's hair; Sitting outside together on a nice day.      Resources for Caregivers:   Alzheimer's Association - [place] Chapter (https://www.alz.org/[place])  [facility] [place] (www.caregiver.org)  American Psychological Association (http://www.apa.org/pi/about/publications/caregivers/consumers/index.aspxconsumers/index.aspx).  The 36-Hour Day, sixth edition: The 36-Hour Day: A Family Guide to Caring for People Who Have Alzheimer Disease, Other Dementias, and Memory Loss, by Alfreda Kennedy

## 2024-05-16 NOTE — PROGRESS NOTES
Palliative and Geriatric Medicine  [facility]      Patient Name: Trudy Horvath     Date: 06/08/2024      Primary Care Physician:  Maria C Guzman A.M., MD    Consult Requested By:  No ref. provider found    Reason for Consult: Advance care planning and symptom management in the setting of Cognitive Impairment    Providers:  Michelle Cat PsyD (Neuropsychology); Xin Castillo DNP (Neurology); Zuleyma Olguin MD (Palliative Medicine)    Assessment/Plan     Plan/Recommendations:  Trudy was seen today for follow-up.    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   -Weight loss: yes, progressive wt loss from 104 to 98lbs  -discussed sitting with her to eat and supervising meds         Palliative Care Encounter / Advance Care Planning      Advance Care Planning     Advance Directives:   Living Will: No    [place]: No    Do Not Resuscitate Status: No    Medical Power of : Yes    Agent's Name:  Lisa Milligan   Agent's Contact Number:  [phone]    Decision Making:  Patient answered questions and Family answered questions  Goals of Care: The patient and family endorses that what is most important right now is to focus on spending time at home and remaining as independent as possible  Accordingly, we have decided that the best plan to meet the patient's goals includes continuing with treatment    Date: 04/18/2024  I initiated the process of voluntary advance care planning today and explained the  importance of this process to the patient. The patient received detailed information about the importance of designating a Health Care Power of  (HCPOA). Patient's daughter, Henrietta, is actively involved in her care and has a general power of , but it is unclear if it includes healthcare decisions. Henrietta is designated as the decision-maker.  After our discussion, the patient has decided to complete a HCPOA and has appointed her daughter, health care agent: Lisa Milligan & health care agent number: [phone]   She was also instructed to communicate with this person about their wishes for future healthcare, should she become sick and lose decision-making capacity.  They named her son-in-law Todd Milligan as backup agent. Daughter shared that they haven't discussed pt's end-of-life wishes and that she has told them what she wants for her  but they ahven't discussed code status. Daughter shared hat pt values her independence however the long term plan will be for them to live together.                Subjective:     Informant: Patient and daughter Henrietta     Chief Complaint:   Chief Complaint   Patient presents with    Follow-up       History of Present Illness:  Trudy Horvath is a 83 y.o. female presenting with cognitive impairment.      Referred to Geriatric and Palliative Care for evaluation and management of advance care planning, and additional support..       They report that they are doing well, walking w/ daughter, they like to walk in the mall and they enjoy shopping     -------------  The patient lives alone and has been experiencing memory issues. Her daughter, Henrietta, has been assisting with her care and managing her medications. The patient has a history of constipation and takes stool softeners off and on. She has had weight loss, dropping from 104 lbs to 92 lbs, but recently gained 2-3 lbs. The patient prefers candy and cake over regular meals, which may be due to feeling lonely when  eating alone.  She denies any falls or injuries and has not required any hospitalizations. The patient's daughter is planning to move in with her mother to provide more consistent care and support.  Daughter assists with grocery shopping, frequently helps with medications and visits.  Difficulty remembering to take medications. Memory issues and repetition. No falls reported. Struggles with balance.    Patient denies shortness of breath, nausea, and any recent falls or injuries.    Palliative Exam     Review of Symptoms      Symptom Assessment (ESAS 0-10 Scale)  Pain:  0  Dyspnea:  0  Anxiety:  0  Nausea:  0  Depression:  0  Anorexia:  0  Fatigue:  0  Insomnia:  0  Restlessness:  0  Agitation:  0     CAM / Delirium:  Negative  Constipation:  Positive      Performance Status:  80    Functional Assessment Scale (FAST):  4    Living Arrangements:  Lives alone    Psychosocial/Cultural:   See Palliative Psychosocial Note: No  , lives alone, has 1 daughter that checks on her daily. Son-in-law also involved in her care   **Primary  to Follow**  Palliative Care  Consult: Yes    Spiritual:  F - Ailyn and Belief:  [employer]      Geriatric Evaluation   4Ms for Medical Decision-Making in Older Adults  Last Completed EAWV: None    MENTATION:   Depression Patient Health Questionnaire:      2/6/2024     8:50 AM   Depression Patient Health Questionnaire   Over the last two weeks how often have you been bothered by little interest or pleasure in doing things Not at all   Over the last two weeks how often have you been bothered by feeling down, depressed or hopeless Several days   PHQ-2 Total Score 1     Has Dementia Dx: Yes    MEDICATIONS:  High Risk Medications:  Total Active Medications: 0  This patient does not have an active medication from one of the medication groupers.    MOBILITY:    Falls:   Fear of falling, balance/trouble walking? yes    Previous falls? no    Mobility Device:  none    Vision:   Recent vision change? no   Baseline corrective lenses? yes    Hearing:   Hearing loss? yes   Hearing aids? no    Caregiver:  Patient has someone to help him/her-paid/unpaid?yes  Primary Caregiver: Daughter Henrietta          Past Medical History:   Diagnosis Date    Hyperlipidemia     Hypertension     Increased glucose level     risk of diabetes    Macular degeneration (senile) of retina, unspecified     Osteoporosis, post-menopausal        Review of patient's allergies indicates:  No Known Allergies    Medications:    Current Outpatient Medications:     amLODIPine (NORVASC) 10 MG tablet, Take 1 tablet (10 mg total) by mouth once daily., Disp: 90 tablet, Rfl: 3    aspirin 81 MG Chew, Take 1 tablet (81 mg total) by mouth once daily., Disp: 90 tablet, Rfl: 3    carvediloL (COREG) 3.125 MG tablet, Take 1 tablet (3.125 mg total) by mouth 2 (two) times daily., Disp: 180 tablet, Rfl: 3    cholecalciferol, vitamin D3, (VITAMIN D3) 50 mcg (2,000 unit) Cap, Take 1 capsule by mouth once daily., Disp: , Rfl:     docosahexanoic acid/vit C/lut (EYE HEALTH ORAL), Take 1 tablet by mouth once daily., Disp: , Rfl:     irbesartan-hydrochlorothiazide (AVALIDE) 300-12.5 mg per tablet, TAKE 1 TABLET BY MOUTH EVERY DAY, Disp: 90 tablet, Rfl: 2    ISTALOL 0.5 % DrpD, , Disp: , Rfl:     potassium chloride SA (K-DUR,KLOR-CON) 20 MEQ tablet, Take 1 tablet (20 mEq total) by mouth 2 (two) times daily., Disp: 180 tablet, Rfl: 3    rosuvastatin (CRESTOR) 20 MG tablet, Take 1 tablet (20 mg total) by mouth once daily., Disp: 90 tablet, Rfl: 3    tetrahydrozoline HCl/peg (EYE DROPS OPHT), Apply 1 tablet to eye once daily., Disp: , Rfl:     denosumab (PROLIA) 60 mg/mL Syrg, Inject 1 mL (60 mg total) into the skin every 6 (six) months., Disp: 2 mL, Rfl: 3         Objective:   Physical Exam:  Vitals: Pulse: (P) 86 (05/16/24 1429)  BP: 137/66 (05/16/24 1429)  Physical Exam  Constitutional:       General: She is not in acute  distress.     Appearance: She is underweight.   HENT:      Head: Atraumatic.   Pulmonary:      Effort: Pulmonary effort is normal. No respiratory distress.   Musculoskeletal:      Comments: Gait instability    Neurological:      General: No focal deficit present.      Mental Status: She is alert. Mental status is at baseline.   Psychiatric:         Mood and Affect: Mood and affect normal.         I spent a total of 32 minutes on the day of the visit. This includes face to face time in discussion of  symptom assessment, coordination of care and emotional support.  This also includes non-face to face time preparing to see the patient (eg, review of tests/imaging), obtaining and/or reviewing separately obtained history, documenting clinical information in the electronic or other health record, independently interpreting results and communicating results to the patient/family/caregiver, or care coordinator. Discussing with [facility] multidisciplinary team that includes, Neuropsychology, neurology, LCSW and .       This note was generated with the assistance of ambient listening technology. Verbal consent was obtained by the patient and accompanying visitor(s) for the recording of patient appointment to facilitate this note. I attest to having reviewed and edited the generated note for accuracy, though some syntax or spelling errors may persist. Please contact the author of this note for any clarification.      Follow up:    Signature: Zuleyma Olguin MD

## 2024-05-17 ENCOUNTER — OUTPATIENT CASE MANAGEMENT (OUTPATIENT)
Dept: NEUROLOGY | Facility: CLINIC | Age: 84
End: 2024-05-17
Payer: MEDICARE

## 2024-05-17 NOTE — PROGRESS NOTES
Social Work - Dementia Care Management:    Met briefly with daughter Henrietta at clinic 5/16/24; she reported no needs at this time, but requested I sent my phone number. Phoned Henrietta today, collected email address and offered to send some general info/resources along with my contact info for future reference.    Emailed the following to caregiver:  [place] with landing page for all [facility] Dementia supports  [place] for Dementia Caregiver Support Group via Zoom  [place] for in-person Dementia Caregiver Support Group at [facility]  [place] for in-person Dementia Caregiver Support Group hosted by [facility]  Link to search support group listings through Alzheimer's [employer]  Link to discussion board for caregivers through Alzheimer's [employer]  Two general Tip Sheets  Home Safety Checklist   Technology and Product Guide  Website for Alzheimer's Association site  Website for Family Caregiver [place] website  Website for Teepa [employer] videos and podcast  Books recs and offers of complimentary copy

## 2024-05-29 ENCOUNTER — DOCUMENT SCAN (OUTPATIENT)
Dept: HOME HEALTH SERVICES | Facility: HOSPITAL | Age: 84
End: 2024-05-29
Payer: MEDICARE

## 2024-05-30 ENCOUNTER — HOSPITAL ENCOUNTER (EMERGENCY)
Facility: HOSPITAL | Age: 84
Discharge: HOME | End: 2024-05-30
Attending: EMERGENCY MEDICINE
Payer: MEDICARE

## 2024-05-30 VITALS
BODY MASS INDEX: 20.39 KG/M2 | RESPIRATION RATE: 18 BRPM | OXYGEN SATURATION: 100 % | HEIGHT: 61 IN | HEART RATE: 88 BPM | SYSTOLIC BLOOD PRESSURE: 143 MMHG | WEIGHT: 108 LBS | TEMPERATURE: 98 F | DIASTOLIC BLOOD PRESSURE: 63 MMHG

## 2024-05-30 DIAGNOSIS — S00.03XA TRAUMATIC HEMATOMA OF SCALP, INITIAL ENCOUNTER: Primary | ICD-10-CM

## 2024-05-30 PROCEDURE — 99285 EMERGENCY DEPT VISIT HI MDM: CPT | Mod: 25

## 2024-05-30 NOTE — ED PROVIDER NOTES
Encounter Date: 5/30/2024       History     Chief Complaint   Patient presents with    Fall     Patient presents to ED after a witnessed fall that happened today [facility] [employer]. Patient reports she tripped and fell hitting her head to the right side. Denies LOC or blood thinner use, but takes 81 mg ASA daily. Denies pain to other joints/extremities, but reports soreness to her head. Bruise + swelling noted to right side of head. Patient is AAO to person, place and situation.      83-year-old female past medical history as below presents after mechanical ground level fall.  Patient was says that she recalls tripping and falling, landing forward onto the right side of her forehead.  Her daughter says this occurred just as she dropped the patient off to walk into a beauty salon.  She denies loss of consciousness.  Denies any preceding lightheadedness, chest pain or shortness of breath.    The history is provided by the patient and a relative.     Review of patient's allergies indicates:  No Known Allergies  Past Medical History:   Diagnosis Date    Hyperlipidemia     Hypertension     Increased glucose level     risk of diabetes    Macular degeneration (senile) of retina, unspecified     Osteoporosis, post-menopausal      Past Surgical History:   Procedure Laterality Date    BREAST BIOPSY      benign     Family History   Problem Relation Name Age of Onset    Heart disease Mother      Stroke Father       Social History     Tobacco Use    Smoking status: Never     Passive exposure: Never    Smokeless tobacco: Never   Substance Use Topics    Alcohol use: No    Drug use: No     Review of Systems    Physical Exam     Initial Vitals [05/30/24 1058]   BP Pulse Resp Temp SpO2   (!) 143/63 88 18 97.7 °F (36.5 °C) 100 %      MAP       --         Physical Exam    Nursing note and vitals reviewed.  Constitutional: She appears well-developed. She is not diaphoretic. No distress.   HENT:   Head: Normocephalic.   Hematoma over R  forehead   Eyes: Conjunctivae and EOM are normal. Pupils are equal, round, and reactive to light.   Neck: Neck supple.   Normal range of motion.  Cardiovascular:  Normal rate.           Pulmonary/Chest: No respiratory distress.   Abdominal: She exhibits no distension.   Musculoskeletal:         General: Normal range of motion.      Cervical back: Normal range of motion and neck supple.      Comments: No T or L spine tenderness. Reproducible tenderness overlying R paralumbar muscles.      Neurological: She is alert. She has normal strength. No sensory deficit.   Steady gait. 5/5 strength and sensation intact bilateral UE.   Skin: Skin is warm and dry.   Psychiatric: She has a normal mood and affect.         ED Course   Procedures  Labs Reviewed - No data to display       Imaging Results              CT Cervical Spine Without Contrast (Final result)  Result time 05/30/24 12:12:17      Final result by Papi Holloway MD (05/30/24 12:12:17)                   Impression:      1. No acute intracranial findings.  2. Right supraorbital/lateral scalp hematoma extending to the preseptal periorbital soft tissues.  No associated skull or facial bone fracture.  3. No acute cervical spine fracture.      Electronically signed by: Papi Holloway  Date:    05/30/2024  Time:    12:12               Narrative:    EXAMINATION:  CT HEAD WITHOUT CONTRAST; CT CERVICAL SPINE WITHOUT CONTRAST; CT ORBITS SELLA POST FOSSA WITHOUT CONT    CLINICAL HISTORY:  head trauma- mild;    TECHNIQUE:  Low dose axial images were obtained through the head, orbits, and cervical spine.  Coronal and sagittal reformations were also performed. Contrast was not administered.    COMPARISON:  None.    FINDINGS:  Head:    Blood: No acute intracranial hemorrhage.    Parenchyma: No definite loss of gray-white differentiation to suggest acute or subacute transcortical infarct. Generalized pattern age-related parenchymal volume loss.  Nonspecific areas of white  matter hypoattenuation may reflect sequela of chronic small vessel ischemic disease.    Ventricles/Extra-axial spaces: No abnormal extra-axial fluid collection. Basal cisterns are patent.    Vessels: Mild atherosclerotic calcification.    Orbits: See below.    Scalp: Right supraorbital/lateral scalp hematoma extending to the right-sided preseptal periorbital soft tissues.    Skull: There are no depressed skull fractures or destructive bone lesions.    Sinuses and mastoids: Relatively minimal paranasal sinus mucosal thickening.    Other findings: None    Orbits:    Bony orbits: No acute fracture or destructive lesion is identified.    Globes: The globes appear normal.  Status post bilateral lens replacements.    Extraocular muscles: The extraocular muscles are symmetric and non-enlarged.    Optic nerve/sheath complexes: There is no CT abnormality of the optic nerve/sheath complexes.    Periorbita: Right-sided findings, as above.  No left-sided periorbital abnormality appreciated.    Retrobulbar fat: The retrobulbar fat is normal bilaterally.    Paranasal sinuses: As above.    Mastoids and middle ear cavities: As above.    Intracranial contents: As above.    Cervical spine:    Fractures: No acute fractures    Alignment: Grade 1 anterolisthesis of C4 on C5.  Atlanto-axial and atlanto-occipital joints: Atlanto-axial and atlanto-occipital intervals are not widened.  Facet joints: There is no traumatic facet joint widening.  Degenerative facet arthropathy.  Vertebral bodies: Query osseous demineralization.  Degenerative endplate change.  Discs: Degenerative disc disease.  Spinal canal and foraminal narrowing: Although CT does not optimally evaluate the soft tissue contents of the spinal canal and foramina, no critical stenosis is suggested.      Paraspinal soft tissues: Unremarkable.    Upper Lungs:Clear                                       CT Orbits Sella Post Fossa Without Cont (Final result)  Result time 05/30/24  12:12:17      Final result by Papi Holloway MD (05/30/24 12:12:17)                   Impression:      1. No acute intracranial findings.  2. Right supraorbital/lateral scalp hematoma extending to the preseptal periorbital soft tissues.  No associated skull or facial bone fracture.  3. No acute cervical spine fracture.      Electronically signed by: Papi Holloway  Date:    05/30/2024  Time:    12:12               Narrative:    EXAMINATION:  CT HEAD WITHOUT CONTRAST; CT CERVICAL SPINE WITHOUT CONTRAST; CT ORBITS SELLA POST FOSSA WITHOUT CONT    CLINICAL HISTORY:  head trauma- mild;    TECHNIQUE:  Low dose axial images were obtained through the head, orbits, and cervical spine.  Coronal and sagittal reformations were also performed. Contrast was not administered.    COMPARISON:  None.    FINDINGS:  Head:    Blood: No acute intracranial hemorrhage.    Parenchyma: No definite loss of gray-white differentiation to suggest acute or subacute transcortical infarct. Generalized pattern age-related parenchymal volume loss.  Nonspecific areas of white matter hypoattenuation may reflect sequela of chronic small vessel ischemic disease.    Ventricles/Extra-axial spaces: No abnormal extra-axial fluid collection. Basal cisterns are patent.    Vessels: Mild atherosclerotic calcification.    Orbits: See below.    Scalp: Right supraorbital/lateral scalp hematoma extending to the right-sided preseptal periorbital soft tissues.    Skull: There are no depressed skull fractures or destructive bone lesions.    Sinuses and mastoids: Relatively minimal paranasal sinus mucosal thickening.    Other findings: None    Orbits:    Bony orbits: No acute fracture or destructive lesion is identified.    Globes: The globes appear normal.  Status post bilateral lens replacements.    Extraocular muscles: The extraocular muscles are symmetric and non-enlarged.    Optic nerve/sheath complexes: There is no CT abnormality of the optic nerve/sheath  complexes.    Periorbita: Right-sided findings, as above.  No left-sided periorbital abnormality appreciated.    Retrobulbar fat: The retrobulbar fat is normal bilaterally.    Paranasal sinuses: As above.    Mastoids and middle ear cavities: As above.    Intracranial contents: As above.    Cervical spine:    Fractures: No acute fractures    Alignment: Grade 1 anterolisthesis of C4 on C5.  Atlanto-axial and atlanto-occipital joints: Atlanto-axial and atlanto-occipital intervals are not widened.  Facet joints: There is no traumatic facet joint widening.  Degenerative facet arthropathy.  Vertebral bodies: Query osseous demineralization.  Degenerative endplate change.  Discs: Degenerative disc disease.  Spinal canal and foraminal narrowing: Although CT does not optimally evaluate the soft tissue contents of the spinal canal and foramina, no critical stenosis is suggested.      Paraspinal soft tissues: Unremarkable.    Upper Lungs:Clear                                       CT Head Without Contrast (Final result)  Result time 05/30/24 12:12:17      Final result by Papi Holloway MD (05/30/24 12:12:17)                   Impression:      1. No acute intracranial findings.  2. Right supraorbital/lateral scalp hematoma extending to the preseptal periorbital soft tissues.  No associated skull or facial bone fracture.  3. No acute cervical spine fracture.      Electronically signed by: Papi Holloway  Date:    05/30/2024  Time:    12:12               Narrative:    EXAMINATION:  CT HEAD WITHOUT CONTRAST; CT CERVICAL SPINE WITHOUT CONTRAST; CT ORBITS SELLA POST FOSSA WITHOUT CONT    CLINICAL HISTORY:  head trauma- mild;    TECHNIQUE:  Low dose axial images were obtained through the head, orbits, and cervical spine.  Coronal and sagittal reformations were also performed. Contrast was not administered.    COMPARISON:  None.    FINDINGS:  Head:    Blood: No acute intracranial hemorrhage.    Parenchyma: No definite loss of  gray-white differentiation to suggest acute or subacute transcortical infarct. Generalized pattern age-related parenchymal volume loss.  Nonspecific areas of white matter hypoattenuation may reflect sequela of chronic small vessel ischemic disease.    Ventricles/Extra-axial spaces: No abnormal extra-axial fluid collection. Basal cisterns are patent.    Vessels: Mild atherosclerotic calcification.    Orbits: See below.    Scalp: Right supraorbital/lateral scalp hematoma extending to the right-sided preseptal periorbital soft tissues.    Skull: There are no depressed skull fractures or destructive bone lesions.    Sinuses and mastoids: Relatively minimal paranasal sinus mucosal thickening.    Other findings: None    Orbits:    Bony orbits: No acute fracture or destructive lesion is identified.    Globes: The globes appear normal.  Status post bilateral lens replacements.    Extraocular muscles: The extraocular muscles are symmetric and non-enlarged.    Optic nerve/sheath complexes: There is no CT abnormality of the optic nerve/sheath complexes.    Periorbita: Right-sided findings, as above.  No left-sided periorbital abnormality appreciated.    Retrobulbar fat: The retrobulbar fat is normal bilaterally.    Paranasal sinuses: As above.    Mastoids and middle ear cavities: As above.    Intracranial contents: As above.    Cervical spine:    Fractures: No acute fractures    Alignment: Grade 1 anterolisthesis of C4 on C5.  Atlanto-axial and atlanto-occipital joints: Atlanto-axial and atlanto-occipital intervals are not widened.  Facet joints: There is no traumatic facet joint widening.  Degenerative facet arthropathy.  Vertebral bodies: Query osseous demineralization.  Degenerative endplate change.  Discs: Degenerative disc disease.  Spinal canal and foraminal narrowing: Although CT does not optimally evaluate the soft tissue contents of the spinal canal and foramina, no critical stenosis is suggested.      Paraspinal soft  tissues: Unremarkable.    Upper Lungs:Clear                                       Medications - No data to display  Medical Decision Making  83-year-old female past medical history as above presents after mechanical, fall.  Differential includes but is not limited to fracture, intracranial hemorrhage, contusion, hematoma.  Patient found to have a hematoma but no other associated traumatic findings including on my independent review.  Shared decision-making with the patient and daughter regarding additional workup labs but given that patient recalls tripping, no indication for additional workup at this time.  Patient ambulating with a steady gait.  Discharged with strict return precautions and outpatient follow up.    No acute emergent medical condition has been identified. The patient appears to be low risk for an emergent medical condition is appropriate for discharge with outpatient f/u as detailed in discharge instructions for reevaluation and possible continued outpatient workup and management. I have discussed the workup with the patient, who has verbalized understanding of the plan and need for outpatient follow-up.  This evaluation does not preclude the development of an emergent condition so in addition, I have advised the patient that they can return to the ED at any time with worsening or change of their symptoms, or with any other medical complaint.       Amount and/or Complexity of Data Reviewed  Independent Historian:      Details: Daughter at bedside throughout ED stay   External Data Reviewed: notes.     Details: Seen 5/16/24 by neuropsychiatry   Radiology: ordered and independent interpretation performed.    Risk  OTC drugs.               ED Course as of 05/30/24 1228   Thu May 30, 2024   1222 CTH, C-spine, Orbits Impression:   1. No acute intracranial findings.  2. Right supraorbital/lateral scalp hematoma extending to the preseptal periorbital soft tissues.  No associated skull or facial bone  fracture.  3. No acute cervical spine fracture.   [AT]      ED Course User Index  [AT] Roopa Leo MD                           Clinical Impression:  Final diagnoses:  [S00.03XA] Traumatic hematoma of scalp, initial encounter (Primary)          ED Disposition Condition    Discharge Stable          ED Prescriptions    None       Follow-up Information       Follow up With Specialties Details Why Contact Info    Maria C Guzman A.M., MD Family Medicine Schedule an appointment as soon as possible for a visit in 2 days  [address] [facility] [address]  [phone]      [facility] Emergency Dept Emergency Medicine  As needed, If symptoms worsen [address] [facility] [id number] [phone]             Roopa Leo MD  05/30/24 [id number]

## 2024-05-30 NOTE — ED TRIAGE NOTES
Pt presents to ED today reports trip and fall in beauty salon today  Striking right side of head   Denies LOC reports she takes daily ASA   NAD noted

## 2024-05-30 NOTE — DISCHARGE INSTRUCTIONS

## 2024-05-31 ENCOUNTER — PATIENT OUTREACH (OUTPATIENT)
Dept: EMERGENCY MEDICINE | Facility: HOSPITAL | Age: 84
End: 2024-05-31
Payer: MEDICARE

## 2024-06-06 ENCOUNTER — DOCUMENT SCAN (OUTPATIENT)
Dept: HOME HEALTH SERVICES | Facility: HOSPITAL | Age: 84
End: 2024-06-06
Payer: MEDICARE

## 2024-06-10 DIAGNOSIS — E78.49 OTHER HYPERLIPIDEMIA: ICD-10-CM

## 2024-06-10 DIAGNOSIS — I10 ESSENTIAL HYPERTENSION, BENIGN: ICD-10-CM

## 2024-06-10 DIAGNOSIS — E87.6 HYPOKALEMIA: ICD-10-CM

## 2024-06-10 DIAGNOSIS — I51.89 DIASTOLIC DYSFUNCTION: ICD-10-CM

## 2024-06-10 DIAGNOSIS — I11.9 HYPERTENSIVE LEFT VENTRICULAR HYPERTROPHY, WITHOUT HEART FAILURE: ICD-10-CM

## 2024-06-10 RX ORDER — IRBESARTAN AND HYDROCHLOROTHIAZIDE 300; 12.5 MG/1; MG/1
1 TABLET, FILM COATED ORAL DAILY
Qty: 90 TABLET | Refills: 2 | Status: SHIPPED | OUTPATIENT
Start: 2024-06-10

## 2024-06-10 RX ORDER — NAPROXEN SODIUM 220 MG/1
81 TABLET, FILM COATED ORAL DAILY
Qty: 90 TABLET | Refills: 3 | Status: SHIPPED | OUTPATIENT
Start: 2024-06-10 | End: 2031-04-16

## 2024-06-10 RX ORDER — ROSUVASTATIN CALCIUM 20 MG/1
20 TABLET, COATED ORAL DAILY
Qty: 90 TABLET | Refills: 2 | Status: SHIPPED | OUTPATIENT
Start: 2024-06-10

## 2024-06-10 RX ORDER — AMLODIPINE BESYLATE 10 MG/1
10 TABLET ORAL DAILY
Qty: 90 TABLET | Refills: 3 | Status: SHIPPED | OUTPATIENT
Start: 2024-06-10

## 2024-06-10 RX ORDER — CARVEDILOL 3.12 MG/1
3.12 TABLET ORAL 2 TIMES DAILY
Qty: 180 TABLET | Refills: 3 | Status: SHIPPED | OUTPATIENT
Start: 2024-06-10

## 2024-06-10 RX ORDER — POTASSIUM CHLORIDE 20 MEQ/1
20 TABLET, EXTENDED RELEASE ORAL 2 TIMES DAILY
Qty: 180 TABLET | Refills: 3 | Status: SHIPPED | OUTPATIENT
Start: 2024-06-10

## 2024-06-10 NOTE — TELEPHONE ENCOUNTER
Refill Routing Note   Medication(s) are not appropriate for processing by [facility] for the following reason(s):        Outside of protocol: aspirin  Required vitals abnormal: amlodipine, carvedilol, irbesartan-hctz  Drug-disease interaction: : irbesartan-hydrochlorothiazide and Hypertensive kidney disease with stage 3b chronic kidney disease; Hypokalemia     [facility] action(s):  Route  Defer      Medication Therapy Plan: Pt visited ED 5/30/24 for Traumatic hematoma of scalp. no change to current pended medications during enounter.    Extended chart review required: Yes     Appointments  past 12m or future 3m with PCP    Date Provider   Last Visit   4/30/2024 Maria C Guzman A.M., MD   Next Visit   9/4/2024 Maria C Guzman A.M., MD   ED visits in past 90 days: 1        Note composed:10:01 AM 06/10/2024

## 2024-06-10 NOTE — TELEPHONE ENCOUNTER
No care due was identified.  [facility] Embedded Care Due Messages. Reference number: [id number].   6/10/2024 6:19:24 AM CDT

## 2024-06-17 ENCOUNTER — EXTERNAL HOME HEALTH (OUTPATIENT)
Dept: HOME HEALTH SERVICES | Facility: HOSPITAL | Age: 84
End: 2024-06-17
Payer: MEDICARE

## 2024-07-29 ENCOUNTER — DOCUMENT SCAN (OUTPATIENT)
Dept: HOME HEALTH SERVICES | Facility: HOSPITAL | Age: 84
End: 2024-07-29
Payer: MEDICARE

## 2024-08-22 ENCOUNTER — PATIENT OUTREACH (OUTPATIENT)
Dept: NEUROLOGY | Facility: CLINIC | Age: 84
End: 2024-08-22
Payer: MEDICARE

## 2024-08-22 NOTE — PROGRESS NOTES
Dementia Care Management    Recruitment Call      Date of Service: 2024  Care Navigator completing this form: Jackie Ramos  Referred by: Previously known to Care Team   PCP: Maria C Guzman A.M., MD    Patient: Trudy Horvath  MRN: [id number]  : 1940  Age: 83 y.o.  Gender: female  Race: White  Ethnicity: Not  or /a    Objective:   Patient and caregiver potentially eligible for [facility] dementia care management programs.    CTN/SW completed outreach attempt on 2024 to assess patient/caregiver interest in the program and screen for eligibility.       Outreach Outcome:   Patient/Caregiver Requested a call back. CTN/SW will make another attempt on 2024 at 4:00 pm.

## 2024-08-23 ENCOUNTER — PATIENT OUTREACH (OUTPATIENT)
Dept: NEUROLOGY | Facility: CLINIC | Age: 84
End: 2024-08-23
Payer: MEDICARE

## 2024-08-23 NOTE — PROGRESS NOTES
Dementia Care Management    Recruitment Call      Date of Service: 2024  Care Navigator completing this form: Jackie Ramos  Referred by: Previously known to Care Team   PCP: Maria C Guzman A.M., MD    Patient: Trudy Horvath  MRN: [id number]  : 1940  Age: 83 y.o.  Gender: female  Race: White  Ethnicity: Not  or /a    Objective:   Patient and caregiver potentially eligible for [facility] dementia care management programs.    CTN/SW completed outreach attempt on 2024 to assess patient/caregiver interest in the program and screen for eligibility.       Outreach Outcome:   Outreach Successful - Pre-Enrollment Call scheduled for 2024 at 9:00 AM . Sent program intro and survey response scales via email.       Dyad screened for: CMS GUIDE Model

## 2024-08-26 ENCOUNTER — OFFICE VISIT (OUTPATIENT)
Dept: FAMILY MEDICINE | Facility: CLINIC | Age: 84
End: 2024-08-26
Payer: MEDICARE

## 2024-08-26 ENCOUNTER — LAB VISIT (OUTPATIENT)
Dept: LAB | Facility: HOSPITAL | Age: 84
End: 2024-08-26
Attending: FAMILY MEDICINE
Payer: MEDICARE

## 2024-08-26 VITALS
HEIGHT: 61 IN | BODY MASS INDEX: 19.86 KG/M2 | OXYGEN SATURATION: 98 % | WEIGHT: 105.19 LBS | HEART RATE: 74 BPM | DIASTOLIC BLOOD PRESSURE: 72 MMHG | SYSTOLIC BLOOD PRESSURE: 138 MMHG

## 2024-08-26 DIAGNOSIS — N18.32 HYPERTENSIVE KIDNEY DISEASE WITH STAGE 3B CHRONIC KIDNEY DISEASE: ICD-10-CM

## 2024-08-26 DIAGNOSIS — I10 ESSENTIAL HYPERTENSION, BENIGN: ICD-10-CM

## 2024-08-26 DIAGNOSIS — E78.49 OTHER HYPERLIPIDEMIA: ICD-10-CM

## 2024-08-26 DIAGNOSIS — I11.9 HYPERTENSIVE LEFT VENTRICULAR HYPERTROPHY, WITHOUT HEART FAILURE: ICD-10-CM

## 2024-08-26 DIAGNOSIS — G30.1 MILD LATE ONSET ALZHEIMER'S DEMENTIA WITH ANXIETY: ICD-10-CM

## 2024-08-26 DIAGNOSIS — R73.01 ABNORMAL FASTING GLUCOSE: ICD-10-CM

## 2024-08-26 DIAGNOSIS — G25.2 RESTING TREMOR: ICD-10-CM

## 2024-08-26 DIAGNOSIS — H35.3231 EXUDATIVE AGE-RELATED MACULAR DEGENERATION OF BOTH EYES WITH ACTIVE CHOROIDAL NEOVASCULARIZATION: ICD-10-CM

## 2024-08-26 DIAGNOSIS — E53.8 B12 DEFICIENCY: ICD-10-CM

## 2024-08-26 DIAGNOSIS — I12.9 HYPERTENSIVE KIDNEY DISEASE WITH STAGE 3B CHRONIC KIDNEY DISEASE: ICD-10-CM

## 2024-08-26 DIAGNOSIS — Z00.00 ENCOUNTER FOR PREVENTIVE HEALTH EXAMINATION: Primary | ICD-10-CM

## 2024-08-26 DIAGNOSIS — M81.0 OSTEOPOROSIS, POST-MENOPAUSAL: ICD-10-CM

## 2024-08-26 DIAGNOSIS — F02.A4 MILD LATE ONSET ALZHEIMER'S DEMENTIA WITH ANXIETY: ICD-10-CM

## 2024-08-26 LAB
ALBUMIN SERPL BCP-MCNC: 3.7 G/DL (ref 3.5–5.2)
ALP SERPL-CCNC: 76 U/L (ref 55–135)
ALT SERPL W/O P-5'-P-CCNC: 12 U/L (ref 10–44)
ANION GAP SERPL CALC-SCNC: 10 MMOL/L (ref 8–16)
AST SERPL-CCNC: 20 U/L (ref 10–40)
BILIRUB SERPL-MCNC: 0.6 MG/DL (ref 0.1–1)
BUN SERPL-MCNC: 25 MG/DL (ref 8–23)
CALCIUM SERPL-MCNC: 10.2 MG/DL (ref 8.7–10.5)
CHLORIDE SERPL-SCNC: 106 MMOL/L (ref 95–110)
CHOLEST SERPL-MCNC: 308 MG/DL (ref 120–199)
CHOLEST/HDLC SERPL: 4.5 {RATIO} (ref 2–5)
CO2 SERPL-SCNC: 24 MMOL/L (ref 23–29)
CREAT SERPL-MCNC: 1.4 MG/DL (ref 0.5–1.4)
EST. GFR  (NO RACE VARIABLE): 36.9 ML/MIN/1.73 M^2
ESTIMATED AVG GLUCOSE: 117 MG/DL (ref 68–131)
GLUCOSE SERPL-MCNC: 106 MG/DL (ref 70–110)
HBA1C MFR BLD: 5.7 % (ref 4–5.6)
HDLC SERPL-MCNC: 69 MG/DL (ref 40–75)
HDLC SERPL: 22.4 % (ref 20–50)
LDLC SERPL CALC-MCNC: 217.2 MG/DL (ref 63–159)
NONHDLC SERPL-MCNC: 239 MG/DL
POTASSIUM SERPL-SCNC: 4.6 MMOL/L (ref 3.5–5.1)
PROT SERPL-MCNC: 7.8 G/DL (ref 6–8.4)
SODIUM SERPL-SCNC: 140 MMOL/L (ref 136–145)
TRIGL SERPL-MCNC: 109 MG/DL (ref 30–150)
VIT B12 SERPL-MCNC: 325 PG/ML (ref 210–950)
VIT B12 SERPL-MCNC: 325 PG/ML (ref 210–950)

## 2024-08-26 PROCEDURE — 83921 ORGANIC ACID SINGLE QUANT: CPT | Performed by: PHYSICIAN ASSISTANT

## 2024-08-26 PROCEDURE — 82607 VITAMIN B-12: CPT | Performed by: PHYSICIAN ASSISTANT

## 2024-08-26 PROCEDURE — 80053 COMPREHEN METABOLIC PANEL: CPT | Performed by: FAMILY MEDICINE

## 2024-08-26 PROCEDURE — 99999 PR PBB SHADOW E&M-EST. PATIENT-LVL IV: CPT | Mod: PBBFAC,,, | Performed by: NURSE PRACTITIONER

## 2024-08-26 PROCEDURE — 99214 OFFICE O/P EST MOD 30 MIN: CPT | Mod: PBBFAC,PO | Performed by: NURSE PRACTITIONER

## 2024-08-26 PROCEDURE — 83036 HEMOGLOBIN GLYCOSYLATED A1C: CPT | Performed by: FAMILY MEDICINE

## 2024-08-26 PROCEDURE — 80061 LIPID PANEL: CPT | Performed by: FAMILY MEDICINE

## 2024-08-26 PROCEDURE — G0439 PPPS, SUBSEQ VISIT: HCPCS | Mod: ,,, | Performed by: NURSE PRACTITIONER

## 2024-08-26 PROCEDURE — 36415 COLL VENOUS BLD VENIPUNCTURE: CPT | Mod: PO | Performed by: PHYSICIAN ASSISTANT

## 2024-08-26 NOTE — PATIENT INSTRUCTIONS
Counseling and Referral of Other Preventative  (Italic type indicates deductible and co-insurance are waived)    Patient Name: Trudy Horvath  Today's Date: 8/26/2024    Health Maintenance       Date Due Completion Date    COVID-19 Vaccine (5 - 2023-24 season) 08/30/2024 (Originally 4/7/2024) 12/7/2023    RSV Vaccine (Age 60+ and Pregnant patients) (1 - 1-dose 60+ series) 08/30/2024 (Originally 10/1/1998) ---    Shingles Vaccine (1 of 2) 09/06/2024 (Originally 10/1/1988) ---    Influenza Vaccine (1) 09/01/2024 12/7/2023    Override on 10/3/2022: Done    Hemoglobin A1c (Prediabetes) 02/06/2025 2/6/2024    TETANUS VACCINE 07/08/2026 7/8/2016    DEXA Scan 03/11/2027 3/11/2024    Lipid Panel 02/06/2029 2/6/2024        No orders of the defined types were placed in this encounter.    The following information is provided to all patients.  This information is to help you find resources for any of the problems found today that may be affecting your health:                  Living healthy guide: www.[facility].[place].gov      Understanding Diabetes: www.diabetes.org      Eating healthy: www.cdc.gov/healthyweight      CDC home safety checklist: www.cdc.gov/steadi/patient.html      Agency on Aging: www.goea.[place].[facility]      Alcoholics anonymous (AA): www.aa.org      Physical Activity: www.rochelle.nih.gov/qn1yebf      Tobacco use: www.quitwithusla.org

## 2024-08-26 NOTE — PROGRESS NOTES
Trudy Horvath presented for a  Medicare AWV and comprehensive Health Risk Assessment today. The following components were reviewed and updated:    Medical history  Family History  Social history  Allergies and Current Medications  Health Risk Assessment  Health Maintenance  Care Team         ** See Completed Assessments for Annual Wellness Visit within the encounter summary.**         The following assessments were completed:  Living Situation  CAGE  Depression Screening  Timed Get Up and Go  Whisper Test  Cognitive Function Screening - deferred due to known alzheimers  Nutrition Screening  ADL Screening  PAQ Screening      Opioid documentation for eAWV      Patient does not have a current opioid prescription.        Review for Substance Use Disorders: Patient does not use substance        Current Outpatient Medications:     amLODIPine (NORVASC) 10 MG tablet, Take 1 tablet (10 mg total) by mouth once daily., Disp: 90 tablet, Rfl: 3    aspirin 81 MG Chew, Take 1 tablet (81 mg total) by mouth once daily., Disp: 90 tablet, Rfl: 3    carvediloL (COREG) 3.125 MG tablet, Take 1 tablet (3.125 mg total) by mouth 2 (two) times daily., Disp: 180 tablet, Rfl: 3    cholecalciferol, vitamin D3, (VITAMIN D3) 50 mcg (2,000 unit) Cap, Take 1 capsule by mouth once daily., Disp: , Rfl:     docosahexanoic acid/vit C/lut (EYE HEALTH ORAL), Take 1 tablet by mouth once daily., Disp: , Rfl:     irbesartan-hydrochlorothiazide (AVALIDE) 300-12.5 mg per tablet, Take 1 tablet by mouth once daily., Disp: 90 tablet, Rfl: 2    rosuvastatin (CRESTOR) 20 MG tablet, Take 1 tablet (20 mg total) by mouth once daily., Disp: 90 tablet, Rfl: 2    denosumab (PROLIA) 60 mg/mL Syrg, Inject 1 mL (60 mg total) into the skin every 6 (six) months. (Patient not taking: Reported on 8/26/2024), Disp: 2 mL, Rfl: 3    ISTALOL 0.5 % DrpD, , Disp: , Rfl:     potassium chloride SA (K-DUR,KLOR-CON) 20 MEQ tablet, Take 1 tablet (20 mEq total) by mouth 2 (two) times  "daily. (Patient not taking: Reported on 8/26/2024), Disp: 180 tablet, Rfl: 3    tetrahydrozoline HCl/peg (EYE DROPS OPHT), Apply 1 tablet to eye once daily. (Patient not taking: Reported on 8/26/2024), Disp: , Rfl:        Vitals:    08/26/24 0804   BP: 138/72   Pulse: 74   SpO2: 98%   Weight: 47.7 kg (105 lb 2.6 oz)   Height: 5' 1" (1.549 m)   PainSc: 0-No pain      Physical Exam  Vitals reviewed.   Constitutional:       General: She is not in acute distress.     Appearance: Normal appearance. She is not ill-appearing.   HENT:      Head: Normocephalic and atraumatic.      Mouth/Throat:      Mouth: Mucous membranes are moist.   Eyes:      General: No scleral icterus.        Right eye: No discharge.         Left eye: No discharge.      Extraocular Movements: Extraocular movements intact.      Conjunctiva/sclera: Conjunctivae normal.   Cardiovascular:      Rate and Rhythm: Normal rate.   Pulmonary:      Effort: Pulmonary effort is normal. No respiratory distress.   Musculoskeletal:      Cervical back: Normal range of motion.   Skin:     General: Skin is warm and dry.      Findings: No bruising.   Neurological:      Mental Status: She is alert and oriented to person, place, and time.   Psychiatric:         Mood and Affect: Mood normal.         Behavior: Behavior normal. Behavior is cooperative.         Cognition and Memory: Cognition normal.               Diagnoses and health risks identified today and associated recommendations/orders:    1. Encounter for preventive health examination  - Chart reviewed. Problem list updated. Discussed current medical diagnosis, current medications, medical/surgical/family/social history; updated provider list; documented vital signs; identified any cognitive impairment; and updated risk factor list. Addressed any outstanding health maintenance. Provided patient with personalized health advice. Continue to follow up with PCP and any specialists.     2. 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 Hypertensive kidney disease with stage 3b chronic kidney disease  Chronic; stable on current treatment plan; follow up with PCP  - taking norvasc, coreg, irbesartan-hctz    4. Hypertensive left ventricular hypertrophy, without heart failure  Chronic; stable on current treatment plan; follow up with PCP  - - taking norvasc, coreg, irbesartan-hctz    5. Exudative age-related macular degeneration of both eyes with active choroidal neovascularization  Chronic; stable on current treatment plan; follow up with PCP  - follow up with ophthalmology     6. Osteoporosis, post-menopausal  Chronic; stable on current treatment plan; follow up with PCP  - taking vit d supplements and prolia     7. B12 deficiency  Chronic; stable on current treatment plan; follow up with PCP    8. Other hyperlipidemia  Chronic; stable on current treatment plan; follow up with PCP  - taking statin     9. Essential hypertension, benign  Chronic; stable on current treatment plan; follow up with PCP  - - taking norvasc, coreg, irbesartan-hctz    10. Resting tremor  Chronic; stable on current treatment plan; follow up with PCP  - follow up with neurology     11. Body mass index (BMI) 19.9 or less, adult  - Recommendation for healthy diet and exercise as tolerated.      Provided [place] with a 5-10 year written screening schedule and personal prevention plan. Recommendations were developed using the USPSTF age appropriate recommendations. Education, counseling, and referrals were provided as needed. After Visit Summary printed and given to patient which includes a list of additional screenings\tests needed.    Follow up in about 1 year (around 8/26/2025) for your next medicare wellness visit.    Kristine ROBLES  Alexsander, FNP-C    Advance Care Planning I offered to discuss advanced care planning, including how to pick a person who would make decisions for you if you were unable to make them for yourself, called a health care power of , and what kind of decisions you might make such as use of life sustaining treatments such as ventilators and tube feeding when faced with a life limiting illness recorded on a living will that they will need to know. (How you want to be cared for as you near the end of your natural life)     X  Patient has advanced directives on file, which we reviewed, and they do not wish to make changes.

## 2024-08-27 RX ORDER — ROSUVASTATIN CALCIUM 20 MG/1
20 TABLET, COATED ORAL DAILY
Qty: 90 TABLET | Refills: 2 | Status: SHIPPED | OUTPATIENT
Start: 2024-08-27

## 2024-08-27 NOTE — TELEPHONE ENCOUNTER
No care due was identified.  Health [facility] Embedded Care Due Messages. Reference number: [id number].   8/26/2024 9:16:32 PM CDT

## 2024-08-27 NOTE — TELEPHONE ENCOUNTER
Refill Decision Note   Trudy Horvath  is requesting a refill authorization.  Brief Assessment and Rationale for Refill:  Approve     Medication Therapy Plan:         Extended chart review required: Yes   Comments:     Note composed:7:00 AM 08/27/2024

## 2024-08-29 ENCOUNTER — PATIENT OUTREACH (OUTPATIENT)
Dept: NEUROLOGY | Facility: CLINIC | Age: 84
End: 2024-08-29
Payer: MEDICARE

## 2024-08-29 DIAGNOSIS — E78.49 OTHER HYPERLIPIDEMIA: ICD-10-CM

## 2024-08-29 NOTE — PROGRESS NOTES
Dementia Care Management  Pre-Enrollment Call      Date of Service: 2024    Care Navigator completing this form: Jackie Ramos  Referring Provider: Former Care Ecosystem Participant  PCP: Maria C Guzman A.M., MD    Patient: Trudy Horvath  MRN: [id number]  : 1940  Age: 83 y.o.  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     Objective: Dementia Care Management Pre-Enrollment Call.    rTudy Horvath is a 83 y.o. female who presents for Dementia Care Management Pre-Enrollment Call. Spoke to Caregiver Henrietta  (Daughter).      Visit Notes:  Pre-Enrollment Call Completed: Pre-enrollment surveys were documented in Epic flowsheets and Initial Visit was scheduled for 2024 at 11:00 AM           Psychosocial History   Primary Caregiver: Daughter Henrietta and MCKAYLA (Son).  Family Status: Patient has only one daughter (Henrietta). Henrietta has two daughters.   Current Living situation: Lives semi-independlty  Support system: MCKAYLA, two granddaughters.Planning to move closer to the daughters for additional support.  Patient's hobbies/interests: Eating.          Caregiver Needs and Resource Assessment     Caregiver needs and wellbeing: A little more help to manage dementia care both in the home and with strategies.Caregiver interested in learning more about Alzheimer's dementia.     List of resources that are currently available to the dyad (ex. Enrolled in community programs, sitter services, home health, etc.): None at this time.Interested in exploring meals on wheels and other options.         Surveys    Health Related Social Needs - [facility]-HRSN Tool  Instrumental Activities of Daily Living/Activities of Daily Living  Dementia Safety Assessment  Behave 5+ (Neuropsychiatric Symptoms)  Zarit Cashton Interview (22 items)  [employer] PROMIS-10  Monthly Tracking  Pre-Visit Form

## 2024-08-29 NOTE — TELEPHONE ENCOUNTER
No care due was identified.  Health [facility] Embedded Care Due Messages. Reference number: [id number].   8/29/2024 4:17:30 PM CDT

## 2024-08-30 LAB — METHYLMALONATE SERPL-SCNC: 0.69 UMOL/L

## 2024-08-30 RX ORDER — ROSUVASTATIN CALCIUM 20 MG/1
20 TABLET, COATED ORAL DAILY
Qty: 90 TABLET | Refills: 2 | OUTPATIENT
Start: 2024-08-30

## 2024-08-30 NOTE — TELEPHONE ENCOUNTER
Refill Decision Note   Trudy Horvath  is requesting a refill authorization.  Brief Assessment and Rationale for Refill:  Quick Discontinue     Medication Therapy Plan:        Comments:     Note composed:9:35 AM 08/30/2024

## 2024-09-13 ENCOUNTER — HOSPITAL ENCOUNTER (INPATIENT)
Facility: HOSPITAL | Age: 84
LOS: 6 days | Discharge: NURSING, REHAB OR LONG-TERM CARE | DRG: 242 | End: 2024-09-19
Attending: EMERGENCY MEDICINE | Admitting: INTERNAL MEDICINE
Payer: MEDICARE

## 2024-09-13 DIAGNOSIS — R07.9 CHEST PAIN: ICD-10-CM

## 2024-09-13 DIAGNOSIS — Z95.0 TEMPORARY TRANSVENOUS CARDIAC PACEMAKER PRESENT: ICD-10-CM

## 2024-09-13 DIAGNOSIS — I44.2 CHB (COMPLETE HEART BLOCK): Primary | ICD-10-CM

## 2024-09-13 DIAGNOSIS — I10 ESSENTIAL HYPERTENSION, BENIGN: ICD-10-CM

## 2024-09-13 DIAGNOSIS — R07.9 CHEST PAIN, UNSPECIFIED TYPE: ICD-10-CM

## 2024-09-13 DIAGNOSIS — N18.32 HYPERTENSIVE KIDNEY DISEASE WITH STAGE 3B CHRONIC KIDNEY DISEASE: ICD-10-CM

## 2024-09-13 DIAGNOSIS — Z97.8 ENDOTRACHEALLY INTUBATED: ICD-10-CM

## 2024-09-13 DIAGNOSIS — I51.89 DIASTOLIC DYSFUNCTION: ICD-10-CM

## 2024-09-13 DIAGNOSIS — I45.9 HEART BLOCK: ICD-10-CM

## 2024-09-13 DIAGNOSIS — I12.9 HYPERTENSIVE KIDNEY DISEASE WITH STAGE 3B CHRONIC KIDNEY DISEASE: ICD-10-CM

## 2024-09-13 PROBLEM — J96.02 ACUTE RESPIRATORY FAILURE WITH HYPOXIA AND HYPERCARBIA: Status: ACTIVE | Noted: 2024-09-13

## 2024-09-13 PROBLEM — J96.01 ACUTE RESPIRATORY FAILURE WITH HYPOXIA AND HYPERCARBIA: Status: ACTIVE | Noted: 2024-09-13

## 2024-09-13 LAB
ALBUMIN SERPL BCP-MCNC: 3.1 G/DL (ref 3.5–5.2)
ALLENS TEST: YES
ALP SERPL-CCNC: 55 U/L (ref 55–135)
ALT SERPL W/O P-5'-P-CCNC: 15 U/L (ref 10–44)
ANION GAP SERPL CALC-SCNC: 15 MMOL/L (ref 8–16)
APICAL FOUR CHAMBER EJECTION FRACTION: 76 %
APICAL TWO CHAMBER EJECTION FRACTION: 79 %
ASCENDING AORTA: 2.63 CM
AST SERPL-CCNC: 19 U/L (ref 10–40)
AV INDEX (PROSTH): 2.27
AV MEAN GRADIENT: 11 MMHG
AV PEAK GRADIENT: 20 MMHG
AV VALVE AREA BY VELOCITY RATIO: 6.04 CM²
AV VALVE AREA: 5.83 CM²
AV VELOCITY RATIO: 2.35
BASOPHILS # BLD AUTO: 0.03 K/UL (ref 0–0.2)
BASOPHILS NFR BLD: 0.2 % (ref 0–1.9)
BILIRUB SERPL-MCNC: 0.5 MG/DL (ref 0.1–1)
BNP SERPL-MCNC: 603 PG/ML (ref 0–99)
BSA FOR ECHO PROCEDURE: 1.45 M2
BUN SERPL-MCNC: 41 MG/DL (ref 8–23)
CALCIUM SERPL-MCNC: 8.8 MG/DL (ref 8.7–10.5)
CHLORIDE SERPL-SCNC: 111 MMOL/L (ref 95–110)
CO2 SERPL-SCNC: 17 MMOL/L (ref 23–29)
CREAT SERPL-MCNC: 2.3 MG/DL (ref 0.5–1.4)
CV ECHO LV RWT: 0.63 CM
DIFFERENTIAL METHOD BLD: ABNORMAL
DOP CALC AO PEAK VEL: 2.23 M/S
DOP CALC AO VTI: 48.9 CM
DOP CALC LVOT AREA: 2.6 CM2
DOP CALC LVOT DIAMETER: 1.81 CM
DOP CALC LVOT PEAK VEL: 5.24 M/S
DOP CALC LVOT STROKE VOLUME: 284.95 CM3
DOP CALC MV VTI: 45.5 CM
DOP CALCLVOT PEAK VEL VTI: 110.8 CM
E WAVE DECELERATION TIME: 332.08 MSEC
E/A RATIO: 0.86
E/E' RATIO: 41.67 M/S
ECHO LV POSTERIOR WALL: 0.99 CM (ref 0.6–1.1)
EOSINOPHIL # BLD AUTO: 0 K/UL (ref 0–0.5)
EOSINOPHIL NFR BLD: 0 % (ref 0–8)
ERYTHROCYTE [DISTWIDTH] IN BLOOD BY AUTOMATED COUNT: 13.6 % (ref 11.5–14.5)
EST. GFR  (NO RACE VARIABLE): 20 ML/MIN/1.73 M^2
FIO2: 100 %
FRACTIONAL SHORTENING: 37 % (ref 28–44)
GLUCOSE SERPL-MCNC: 142 MG/DL (ref 70–110)
GLUCOSE SERPL-MCNC: 172 MG/DL (ref 70–110)
HCT VFR BLD AUTO: 30.3 % (ref 37–48.5)
HGB BLD-MCNC: 10.1 G/DL (ref 12–16)
HR MV ECHO: 60 BPM
IMM GRANULOCYTES # BLD AUTO: 0.12 K/UL (ref 0–0.04)
IMM GRANULOCYTES NFR BLD AUTO: 0.9 % (ref 0–0.5)
INTERVENTRICULAR SEPTUM: 1.16 CM (ref 0.6–1.1)
IVC DIAMETER: 2.23 CM
LEFT ATRIUM AREA SYSTOLIC (APICAL 2 CHAMBER): 15.48 CM2
LEFT ATRIUM AREA SYSTOLIC (APICAL 4 CHAMBER): 17.96 CM2
LEFT ATRIUM SIZE: 3.1 CM
LEFT ATRIUM VOLUME INDEX MOD: 31 ML/M2
LEFT ATRIUM VOLUME MOD: 44.99 CM3
LEFT INTERNAL DIMENSION IN SYSTOLE: 1.97 CM (ref 2.1–4)
LEFT VENTRICLE DIASTOLIC VOLUME INDEX: 27.29 ML/M2
LEFT VENTRICLE DIASTOLIC VOLUME: 39.57 ML
LEFT VENTRICLE END DIASTOLIC VOLUME APICAL 2 CHAMBER: 45.35 ML
LEFT VENTRICLE END DIASTOLIC VOLUME APICAL 4 CHAMBER: 44.65 ML
LEFT VENTRICLE END SYSTOLIC VOLUME APICAL 2 CHAMBER: 37.28 ML
LEFT VENTRICLE END SYSTOLIC VOLUME APICAL 4 CHAMBER: 51.1 ML
LEFT VENTRICLE MASS INDEX: 68 G/M2
LEFT VENTRICLE SYSTOLIC VOLUME INDEX: 8.5 ML/M2
LEFT VENTRICLE SYSTOLIC VOLUME: 12.26 ML
LEFT VENTRICULAR INTERNAL DIMENSION IN DIASTOLE: 3.15 CM (ref 3.5–6)
LEFT VENTRICULAR MASS: 98.46 G
LV LATERAL E/E' RATIO: 41.67 M/S
LV SEPTAL E/E' RATIO: 41.67 M/S
LVED V (TEICH): 39.57 ML
LVES V (TEICH): 12.26 ML
LVOT MG: 63.13 MMHG
LVOT MV: 3.73 CM/S
LYMPHOCYTES # BLD AUTO: 1.4 K/UL (ref 1–4.8)
LYMPHOCYTES NFR BLD: 9.9 % (ref 18–48)
MAGNESIUM SERPL-MCNC: 1.9 MG/DL (ref 1.6–2.6)
MCH RBC QN AUTO: 29.8 PG (ref 27–31)
MCHC RBC AUTO-ENTMCNC: 33.3 G/DL (ref 32–36)
MCV RBC AUTO: 89 FL (ref 82–98)
MONOCYTES # BLD AUTO: 0.6 K/UL (ref 0.3–1)
MONOCYTES NFR BLD: 4.3 % (ref 4–15)
MR PISA EROA: 0.28 CM2
MV A" WAVE DURATION": 159.85 MSEC
MV MEAN GRADIENT: 5 MMHG
MV PEAK A VEL: 1.45 M/S
MV PEAK E VEL: 1.25 M/S
MV PEAK GRADIENT: 8 MMHG
MV STENOSIS PRESSURE HALF TIME: 96.3 MS
MV VALVE AREA BY CONTINUITY EQUATION: 6.26 CM2
MV VALVE AREA P 1/2 METHOD: 2.28 CM2
NEUTROPHILS # BLD AUTO: 11.9 K/UL (ref 1.8–7.7)
NEUTROPHILS NFR BLD: 84.7 % (ref 38–73)
NRBC BLD-RTO: 0 /100 WBC
OHS LV EJECTION FRACTION SIMPSONS BIPLANE MOD: 77 %
PCO2 BLDA: 30.6 MMHG (ref 35–45)
PEEP: 5
PH SMN: 7.38 [PH] (ref 7.35–7.45)
PISA MRMAX VEL: 5.68 M/S
PISA RADIUS: 0.64 CM
PISA TR MAX VEL: 3.19 M/S
PISA VN NYQUIST MS: 0.61 M/S
PISA VN NYQUIST: 0.61 M/S
PLATELET # BLD AUTO: 278 K/UL (ref 150–450)
PMV BLD AUTO: 10.1 FL (ref 9.2–12.9)
PO2 BLDA: 444 MMHG (ref 80–100)
POC BASE DEFICIT: -6.2 MMOL/L (ref -2–2)
POC HCO3: 18 MMOL/L (ref 24–28)
POC PERFORMED BY: ABNORMAL
POC SATURATED O2: >100 % (ref 95–100)
POC SET RR: 24
POCT GLUCOSE: 121 MG/DL (ref 70–110)
POCT GLUCOSE: 142 MG/DL (ref 70–110)
POTASSIUM SERPL-SCNC: 4.5 MMOL/L (ref 3.5–5.1)
PROT SERPL-MCNC: 6.1 G/DL (ref 6–8.4)
PV MV: 0.83 M/S
PV PEAK GRADIENT: 7 MMHG
PV PEAK VELOCITY: 1.33 M/S
RA VOL SYS: 22.53 ML
RBC # BLD AUTO: 3.39 M/UL (ref 4–5.4)
RIGHT ATRIAL AREA: 10.3 CM2
RIGHT ATRIUM VOLUME AREA LENGTH APICAL 4 CHAMBER: 20.93 ML
RV TISSUE DOPPLER FREE WALL SYSTOLIC VELOCITY 1 (APICAL 4 CHAMBER VIEW): 13.38 CM/S
SINUS: 2.77 CM
SODIUM SERPL-SCNC: 143 MMOL/L (ref 136–145)
SPECIMEN SOURCE: ABNORMAL
STJ: 2.53 CM
TDI LATERAL: 0.03 M/S
TDI SEPTAL: 0.03 M/S
TDI: 0.03 M/S
TR MAX PG: 41 MMHG
TRICUSPID ANNULAR PLANE SYSTOLIC EXCURSION: 2.17 CM
TROPONIN I SERPL DL<=0.01 NG/ML-MCNC: 0.07 NG/ML (ref 0–0.03)
TROPONIN I SERPL DL<=0.01 NG/ML-MCNC: 0.22 NG/ML (ref 0–0.03)
TROPONIN I SERPL DL<=0.01 NG/ML-MCNC: 0.34 NG/ML (ref 0–0.03)
TSH SERPL DL<=0.005 MIU/L-ACNC: 3.86 UIU/ML (ref 0.4–4)
VT: 380
WBC # BLD AUTO: 13.98 K/UL (ref 3.9–12.7)
Z-SCORE OF LEFT VENTRICULAR DIMENSION IN END DIASTOLE: -3.31
Z-SCORE OF LEFT VENTRICULAR DIMENSION IN END SYSTOLE: -2.56

## 2024-09-13 PROCEDURE — 85025 COMPLETE CBC W/AUTO DIFF WBC: CPT | Performed by: EMERGENCY MEDICINE

## 2024-09-13 PROCEDURE — 94002 VENT MGMT INPAT INIT DAY: CPT

## 2024-09-13 PROCEDURE — 80053 COMPREHEN METABOLIC PANEL: CPT | Performed by: EMERGENCY MEDICINE

## 2024-09-13 PROCEDURE — 31500 INSERT EMERGENCY AIRWAY: CPT

## 2024-09-13 PROCEDURE — 20000000 HC ICU ROOM

## 2024-09-13 PROCEDURE — 36410 VNPNXR 3YR/> PHY/QHP DX/THER: CPT

## 2024-09-13 PROCEDURE — 36600 WITHDRAWAL OF ARTERIAL BLOOD: CPT

## 2024-09-13 PROCEDURE — 99223 1ST HOSP IP/OBS HIGH 75: CPT | Mod: 25,FS,, | Performed by: NURSE PRACTITIONER

## 2024-09-13 PROCEDURE — 83880 ASSAY OF NATRIURETIC PEPTIDE: CPT | Performed by: EMERGENCY MEDICINE

## 2024-09-13 PROCEDURE — 83735 ASSAY OF MAGNESIUM: CPT | Performed by: EMERGENCY MEDICINE

## 2024-09-13 PROCEDURE — 27100171 HC OXYGEN HIGH FLOW UP TO 24 HOURS

## 2024-09-13 PROCEDURE — 99900026 HC AIRWAY MAINTENANCE (STAT)

## 2024-09-13 PROCEDURE — 25000003 PHARM REV CODE 250

## 2024-09-13 PROCEDURE — 99900035 HC TECH TIME PER 15 MIN (STAT)

## 2024-09-13 PROCEDURE — 27200667 HC PACEMAKER, TEMPORARY MONITORING, PER SHIFT

## 2024-09-13 PROCEDURE — C1751 CATH, INF, PER/CENT/MIDLINE: HCPCS

## 2024-09-13 PROCEDURE — 84443 ASSAY THYROID STIM HORMONE: CPT | Performed by: EMERGENCY MEDICINE

## 2024-09-13 PROCEDURE — 93010 ELECTROCARDIOGRAM REPORT: CPT | Mod: ,,, | Performed by: INTERNAL MEDICINE

## 2024-09-13 PROCEDURE — 63600175 PHARM REV CODE 636 W HCPCS: Performed by: EMERGENCY MEDICINE

## 2024-09-13 PROCEDURE — 84484 ASSAY OF TROPONIN QUANT: CPT | Performed by: EMERGENCY MEDICINE

## 2024-09-13 PROCEDURE — 96375 TX/PRO/DX INJ NEW DRUG ADDON: CPT

## 2024-09-13 PROCEDURE — 93005 ELECTROCARDIOGRAM TRACING: CPT

## 2024-09-13 PROCEDURE — 82803 BLOOD GASES ANY COMBINATION: CPT

## 2024-09-13 PROCEDURE — 51798 US URINE CAPACITY MEASURE: CPT

## 2024-09-13 PROCEDURE — 84484 ASSAY OF TROPONIN QUANT: CPT | Mod: 91

## 2024-09-13 PROCEDURE — 27201423 OPTIME MED/SURG SUP & DEVICES STERILE SUPPLY: Performed by: INTERNAL MEDICINE

## 2024-09-13 PROCEDURE — 5A1223Z PERFORMANCE OF CARDIAC PACING, CONTINUOUS: ICD-10-PCS | Performed by: INTERNAL MEDICINE

## 2024-09-13 PROCEDURE — 33210 INSERT ELECTRD/PM CATH SNGL: CPT | Performed by: INTERNAL MEDICINE

## 2024-09-13 PROCEDURE — 96365 THER/PROPH/DIAG IV INF INIT: CPT

## 2024-09-13 PROCEDURE — 33210 INSERT ELECTRD/PM CATH SNGL: CPT | Mod: ,,, | Performed by: INTERNAL MEDICINE

## 2024-09-13 PROCEDURE — 0BH17EZ INSERTION OF ENDOTRACHEAL AIRWAY INTO TRACHEA, VIA NATURAL OR ARTIFICIAL OPENING: ICD-10-PCS | Performed by: EMERGENCY MEDICINE

## 2024-09-13 PROCEDURE — 51701 INSERT BLADDER CATHETER: CPT

## 2024-09-13 PROCEDURE — 94761 N-INVAS EAR/PLS OXIMETRY MLT: CPT | Mod: XB

## 2024-09-13 PROCEDURE — C1894 INTRO/SHEATH, NON-LASER: HCPCS | Performed by: INTERNAL MEDICINE

## 2024-09-13 PROCEDURE — 99285 EMERGENCY DEPT VISIT HI MDM: CPT | Mod: 25

## 2024-09-13 PROCEDURE — 63600175 PHARM REV CODE 636 W HCPCS

## 2024-09-13 PROCEDURE — 5A1945Z RESPIRATORY VENTILATION, 24-96 CONSECUTIVE HOURS: ICD-10-PCS | Performed by: EMERGENCY MEDICINE

## 2024-09-13 PROCEDURE — 25000003 PHARM REV CODE 250: Performed by: NURSE PRACTITIONER

## 2024-09-13 RX ORDER — PROPOFOL 10 MG/ML
0-50 INJECTION, EMULSION INTRAVENOUS CONTINUOUS
Status: DISCONTINUED | OUTPATIENT
Start: 2024-09-13 | End: 2024-09-16

## 2024-09-13 RX ORDER — PROPOFOL 10 MG/ML
10 VIAL (ML) INTRAVENOUS ONCE
Status: DISCONTINUED | OUTPATIENT
Start: 2024-09-13 | End: 2024-09-15

## 2024-09-13 RX ORDER — SODIUM CHLORIDE 9 MG/ML
INJECTION, SOLUTION INTRAVENOUS CONTINUOUS
Status: DISCONTINUED | OUTPATIENT
Start: 2024-09-13 | End: 2024-09-13

## 2024-09-13 RX ORDER — FENTANYL CITRATE 50 UG/ML
25 INJECTION, SOLUTION INTRAMUSCULAR; INTRAVENOUS
Status: DISPENSED | OUTPATIENT
Start: 2024-09-13 | End: 2024-09-14

## 2024-09-13 RX ORDER — ROCURONIUM BROMIDE 10 MG/ML
INJECTION, SOLUTION INTRAVENOUS
Status: COMPLETED
Start: 2024-09-13 | End: 2024-09-13

## 2024-09-13 RX ORDER — PROPOFOL 10 MG/ML
INJECTION, EMULSION INTRAVENOUS
Status: DISPENSED
Start: 2024-09-13 | End: 2024-09-14

## 2024-09-13 RX ORDER — DOPAMINE HYDROCHLORIDE 160 MG/100ML
5 INJECTION, SOLUTION INTRAVENOUS CONTINUOUS
Status: DISCONTINUED | OUTPATIENT
Start: 2024-09-13 | End: 2024-09-16

## 2024-09-13 RX ORDER — HEPARIN SODIUM 5000 [USP'U]/ML
5000 INJECTION, SOLUTION INTRAVENOUS; SUBCUTANEOUS EVERY 8 HOURS
Status: DISCONTINUED | OUTPATIENT
Start: 2024-09-13 | End: 2024-09-16

## 2024-09-13 RX ORDER — ACETAMINOPHEN 325 MG/1
650 TABLET ORAL EVERY 4 HOURS PRN
Status: DISCONTINUED | OUTPATIENT
Start: 2024-09-13 | End: 2024-09-19 | Stop reason: HOSPADM

## 2024-09-13 RX ORDER — ETOMIDATE 2 MG/ML
INJECTION INTRAVENOUS
Status: COMPLETED
Start: 2024-09-13 | End: 2024-09-13

## 2024-09-13 RX ORDER — ONDANSETRON 8 MG/1
8 TABLET, ORALLY DISINTEGRATING ORAL EVERY 8 HOURS PRN
Status: DISCONTINUED | OUTPATIENT
Start: 2024-09-13 | End: 2024-09-19 | Stop reason: HOSPADM

## 2024-09-13 RX ORDER — SODIUM CHLORIDE 9 MG/ML
INJECTION, SOLUTION INTRAVENOUS CONTINUOUS
Status: DISCONTINUED | OUTPATIENT
Start: 2024-09-13 | End: 2024-09-18

## 2024-09-13 RX ADMIN — FENTANYL CITRATE 25 MCG: 50 INJECTION INTRAMUSCULAR; INTRAVENOUS at 05:09

## 2024-09-13 RX ADMIN — ROCURONIUM BROMIDE 70 MG: 10 INJECTION INTRAVENOUS at 01:09

## 2024-09-13 RX ADMIN — PROPOFOL 10 MCG/KG/MIN: 10 INJECTION, EMULSION INTRAVENOUS at 01:09

## 2024-09-13 RX ADMIN — ETOMIDATE 20 MG: 2 INJECTION INTRAVENOUS at 01:09

## 2024-09-13 RX ADMIN — SODIUM CHLORIDE: 9 INJECTION, SOLUTION INTRAVENOUS at 03:09

## 2024-09-13 RX ADMIN — PROPOFOL 50 MCG/KG/MIN: 10 INJECTION, EMULSION INTRAVENOUS at 03:09

## 2024-09-13 RX ADMIN — HEPARIN SODIUM 5000 UNITS: 5000 INJECTION INTRAVENOUS; SUBCUTANEOUS at 09:09

## 2024-09-13 RX ADMIN — DOPAMINE HYDROCHLORIDE 2.5 MCG/KG/MIN: 160 INJECTION, SOLUTION INTRAVENOUS at 12:09

## 2024-09-13 RX ADMIN — PROPOFOL 50 MCG/KG/MIN: 10 INJECTION, EMULSION INTRAVENOUS at 06:09

## 2024-09-13 NOTE — Clinical Note
The right groin and right neck was prepped. The site was prepped with ChloraPrep. The site was clipped. The patient was draped.

## 2024-09-13 NOTE — Clinical Note
Report was given to KAUSHIK Euceda for . Pt. Is awake, alert and oriented to self. Cardiac monitor shows paced rhythm with , oxygen is at 3 L NC and oxygen saturations are ranging from 85-89% Skin is PWD.

## 2024-09-13 NOTE — NURSING
Patient arrived from cath lab.  Room was not ready at time of arrival.  Bed had just finished being cleaned by EVS.  Room was not yet equipped with safety equipment or ventilator by Respiratory Therapist.  Patient awake, attempting to remove ETT at time of arrival.  ETT at 22 cm at the teeth.  Ventilator set up and patient placed on ICU Vent by Respiratory therapist.  Settings verified with therapist transferring patient.  Vital signs performed.  PIV X 3, OGT at 56 cm marking.  Purewick placed.  RIGHT Chest with TVP line taped with tegaderm, OMARI cordis in place.  TVP set at 60 ppm Ventricular paced, 10 mA, 2 mV; DDD.  ICU monitoring started, Propofol at 50 mcg/kg/min for patient comfort.  Bilateral wrist restraints placed.

## 2024-09-13 NOTE — HOSPITAL COURSE
09/13/2024 Per HPI   09/18/2024 S/p PPM without complication. Site WNL. Pending PT today. Lopressor up titrated. HR 90s-100s.

## 2024-09-13 NOTE — ASSESSMENT & PLAN NOTE
Last TTE 2018    1 - Normal left ventricular systolic function (EF 60-65%).     2 - Impaired LV relaxation, increased LVEDP.     3 - Normal right ventricular systolic function .     4 - The estimated PA systolic pressure is 40 mmHg.     Repeat TTE once TVP placed- pacer pads in place with TCP in progress   Euvolemic on exam  Kelsi NOBLE

## 2024-09-13 NOTE — SUBJECTIVE & OBJECTIVE
Past Medical History:   Diagnosis Date    Hyperlipidemia     Hypertension     Increased glucose level     risk of diabetes    Macular degeneration (senile) of retina, unspecified     Osteoporosis, post-menopausal        Past Surgical History:   Procedure Laterality Date    BREAST BIOPSY      benign       Review of patient's allergies indicates:  No Known Allergies    No current facility-administered medications on file prior to encounter.     Current Outpatient Medications on File Prior to Encounter   Medication Sig    amLODIPine (NORVASC) 10 MG tablet Take 1 tablet (10 mg total) by mouth once daily.    aspirin 81 MG Chew Take 1 tablet (81 mg total) by mouth once daily.    cholecalciferol, vitamin D3, (VITAMIN D3) 50 mcg (2,000 unit) Cap Take 1 capsule by mouth once daily.    denosumab (PROLIA) 60 mg/mL Syrg Inject 1 mL (60 mg total) into the skin every 6 (six) months. (Patient not taking: Reported on 8/26/2024)    docosahexanoic acid/vit C/lut (EYE HEALTH ORAL) Take 1 tablet by mouth once daily.    irbesartan-hydrochlorothiazide (AVALIDE) 300-12.5 mg per tablet Take 1 tablet by mouth once daily.    potassium chloride SA (K-DUR,KLOR-CON) 20 MEQ tablet Take 1 tablet (20 mEq total) by mouth 2 (two) times daily. (Patient not taking: Reported on 8/26/2024)    rosuvastatin (CRESTOR) 20 MG tablet Take 1 tablet (20 mg total) by mouth once daily.    tetrahydrozoline HCl/peg (EYE DROPS OPHT) Apply 1 tablet to eye once daily. (Patient not taking: Reported on 8/26/2024)    [DISCONTINUED] carvediloL (COREG) 3.125 MG tablet Take 1 tablet (3.125 mg total) by mouth 2 (two) times daily.    [DISCONTINUED] ISTALOL 0.5 % DrpD  (Patient not taking: Reported on 8/26/2024)     Family History       Problem Relation (Age of Onset)    Heart disease Mother    Hypertension Father    No Known Problems Sister, Daughter    Stroke Father          Tobacco Use    Smoking status: Never     Passive exposure: Never    Smokeless tobacco: Never  "  Substance and Sexual Activity    Alcohol use: No    Drug use: Never    Sexual activity: Not Currently     Review of Systems   Unable to perform ROS: Intubated     Objective:     Vital Signs (Most Recent):  Pulse: 68 (09/13/24 1319)  Resp: 12 (09/13/24 1319)  BP: (!) 143/65 (09/13/24 1319)  SpO2: 98 % (09/13/24 1319) Vital Signs (24h Range):  Pulse:  [68-70] 68  Resp:  [0-26] 12  SpO2:  [98 %-100 %] 98 %  BP: (143-146)/(60-65) 143/65     Weight: 65 kg (143 lb 4.8 oz)  Body mass index is 27.08 kg/m².    SpO2: 98 %       No intake or output data in the 24 hours ending 09/13/24 1353    Lines/Drains/Airways       Drain  Duration                  NG/OG Tube 09/13/24 1318 Hood sump 18 Fr. Center mouth <1 day              Airway  Duration                  Airway - Non-Surgical 09/13/24 1304 Endotracheal Tube <1 day              Peripheral Intravenous Line  Duration                  Peripheral IV - Single Lumen 09/13/24 1300 18 G Left Forearm <1 day         Peripheral IV - Single Lumen 09/13/24 1306 18 G Left Antecubital <1 day         Peripheral IV - Single Lumen 09/13/24 18 G Right Antecubital <1 day                     Physical Exam  Constitutional:       Appearance: She is ill-appearing. She is not diaphoretic.      Interventions: She is intubated.   HENT:      Head: Atraumatic.   Eyes:      General:         Right eye: No discharge.         Left eye: No discharge.   Cardiovascular:      Rate and Rhythm: Bradycardia present.      Heart sounds: Murmur heard.   Pulmonary:      Effort: She is intubated.   Abdominal:      General: Bowel sounds are normal.      Palpations: Abdomen is soft.   Musculoskeletal:      Right lower leg: No edema.      Left lower leg: No edema.   Skin:     General: Skin is warm and dry.          Significant Labs: ABG:   Recent Labs   Lab 09/13/24  1323   PH 7.379   PCO2 30.6*   HCO3 18.0*   POCSATURATED >100.0*   , BMP: No results for input(s): "GLU", "NA", "K", "CL", "CO2", "BUN", "CREATININE", " ""CALCIUM", "MG" in the last 48 hours., CMP No results for input(s): "NA", "K", "CL", "CO2", "GLU", "BUN", "CREATININE", "CALCIUM", "PROT", "ALBUMIN", "BILITOT", "ALKPHOS", "AST", "ALT", "ANIONGAP", "ESTGFRAFRICA", "EGFRNONAA" in the last 48 hours., CBC   Recent Labs   Lab 09/13/24  1342   WBC 13.98*   HGB 10.1*   HCT 30.3*      , INR No results for input(s): "INR", "PROTIME" in the last 48 hours., Lipid Panel No results for input(s): "CHOL", "HDL", "LDLCALC", "TRIG", "CHOLHDL" in the last 48 hours., Troponin No results for input(s): "TROPONINI" in the last 48 hours., and All pertinent lab results from the last 24 hours have been reviewed.    Significant Imaging: Echocardiogram: Transthoracic echo (TTE) complete (Cupid Only): No results found for this or any previous visit.  "

## 2024-09-13 NOTE — BRIEF OP NOTE
[facility] - Cath Lab ([facility])  Surgery Department  Operative Note    SUMMARY   POST CATH NOTE    Date of Procedure: 9/13/2024     Procedure: Procedure(s) (LRB):  Insertion, Pacemaker, Temporary Transvenous (N/A)   Moderate conscious sedation was performed under my direct supervision by a sedation trained nurse and cardiorespiratory functions were monitored the entire procedure. See the medication administration log for medications administered, duration, route, and dosage.    Surgeons and Role:     * Bre Melgar MD - Primary    Assisting Surgeon: None    Pre-Operative Diagnosis: CHB (complete heart block) [I44.2]    Post-Operative Diagnosis: Post-Op Diagnosis Codes:     * CHB (complete heart block) [I44.2]    Description of Pre-Procedure(s): Prior to cardiac catheterization, emergency consent was obtained due to critical status of patient.   Myself along with the scrub tech and the nurse entered the room.  A formal timeout was performed in which patient was identified by name, date of birth, medical record number and procedure being performed; all agreed. The patient was prepped and draped in the usual sterile fashion. Please see attached procedure log for specifics regarding approach/equipment.    Procedure(s):   Access: RIJ  Temporary pacemaker insertion: Using direct fluoroscopic guidance, a 5Fr balloon-assisted  ventricular temporary transvenous pacing wire was advanced into the right ventricle. After  confirmation of location, the wire was connected to the pacing console. Pacing thresholds were  tested. The wire was then secured and covered with a catheter cover and a sterile dressing.     Access:  Right internal jugular vein     Close:  5 Georgian sheath sutured in place     Ventricular pacemaker settings     Rate at 60 beats per minute  V output 2 mA.  Loss of capture at 0.4 mA     Complications:  No apparent immediate complications postprocedure      Closure device: NA  Complications: None  EBL:  Minimal    Post Cath Exam:  BP (!) 143/65   Pulse 69   Resp 12   Wt 65 kg (143 lb 4.8 oz)   SpO2 98%   BMI 27.08 kg/m²       Assessment:   CHB s/p RIJ TVP placement     Plan:  -CXR  -Remainder of care per ICU team    Bre Melgar M.D.  Interventional Cardiology   [facility]

## 2024-09-13 NOTE — ASSESSMENT & PLAN NOTE
Hypotensive on admission with BP 80s/40s  Hold home antihypertensives  Avoid AV katharina blockers   Labs pending   Baseline Cr 1-1.4  Avoid nephrotoxic agents

## 2024-09-13 NOTE — ED NOTES
Pt. Changed from EMS pacing pads to ED pacing, hr-30's with a pulse noted. Pacing setting rate-70 and obtained capture at ma-44 with + pulse.

## 2024-09-13 NOTE — PLAN OF CARE
Patient arrived from Cath Lab this shift with TVP in place. See vital signs and assessments for documentation. See below for updates.    Pulmonary:  ACVC ventilator assisted breathing via 7.0 ETT.  Secured 22 cm at the teeth.    Cardiac: TVP to RIJ Cordis in place.    Neurological: Sedated with 50 mcg/kg/min Propofol for RASS -2.  Awakens to voice.  Bilateral wrist restraints in place for patient safety.    Gastrointestinal: Unknown LBM.  Patient unable to answer, family does not know last bowel movement.    Genitourinary: Purewick in place.    Endocrine: NPO, OGT in place 56 cm.  Secured to ETT.    Integumentary/other: See LDA avatar for skin assessment.      Gtts:  Propofol, MIVF to cordis.    POC: Vent management, TVP patency, comfort, and pain management.    Mrs. Horvath's family updated on POC. Questions and concerns addressed. Password of [id number] given to family to call for updates.  WCTM.

## 2024-09-13 NOTE — NURSING
Patient arrived to ICU from cath lab. Right IJ TVP in place--pacing at 60 bpm. Patient intubated and sedated. Connected to vent per Vinicius. RT. VS stable upon arrival to unit. Pulmonary/critical care team notified of patient's arrival to unit. Safety precautions in place. No belongings sent with patient.

## 2024-09-13 NOTE — HPI
HPI retrieved from chart. Patient intubated. No family at bedside for collateral. Trudy Horvath is a 83 y.o. female with Hyperlipidemia, Hypertension, dementia. The patient presents to the ED due to lethargy and unresponsiveness.  Patient was brought in by ambulance who reported that patient was in high-degree heart block.  Her initial blood pressures were 80s over 40s.  They transcutaneously paced her and due to agitation gave her a total of 5 mg of IM Versed.  Patient was brought in unresponsive being bag-valve mask.  Initial blood pressure was 146/60 on transcutaneous pacing.  Patient noted to be in CHB- TCP in progress at 70 BPM. Intubated and sedated. BB discontinued.   Pending TVP placement  Labs and TTE pending

## 2024-09-13 NOTE — CONSULTS
[place] - Cath Lab ([facility])  Cardiology  Consult Note    Patient Name: Trudy Horvath  MRN: [id number]  Admission Date: 9/13/2024  Hospital Length of Stay: 0 days  Code Status: No Order   Attending Provider: No att. providers found   Consulting Provider: Jesse Vanegas NP  Primary Care Physician: Maria C Guzman A.M., MD  Principal Problem:<principal problem not specified>    Patient information was obtained from past medical records and ER records.     Inpatient consult to Cardiology-[facility]  Consult performed by: Jesse Vanegas NP  Consult ordered by: Herbie Rubi Jr., MD        Subjective:     Chief Complaint:  CHB     HPI:   HPI retrieved from chart. Patient intubated. No family at bedside for collateral. Trudy Horvath is a 83 y.o. female with Hyperlipidemia, Hypertension, dementia. The patient presents to the ED due to lethargy and unresponsiveness.  Patient was brought in by ambulance who reported that patient was in high-degree heart block.  Her initial blood pressures were 80s over 40s.  They transcutaneously paced her and due to agitation gave her a total of 5 mg of IM Versed.  Patient was brought in unresponsive being bag-valve mask.  Initial blood pressure was 146/60 on transcutaneous pacing.  Patient noted to be in CHB- TCP in progress at 70 BPM. Intubated and sedated. BB discontinued.   Pending TVP placement  Labs and TTE pending      Past Medical History:   Diagnosis Date    Hyperlipidemia     Hypertension     Increased glucose level     risk of diabetes    Macular degeneration (senile) of retina, unspecified     Osteoporosis, post-menopausal        Past Surgical History:   Procedure Laterality Date    BREAST BIOPSY      benign       Review of patient's allergies indicates:  No Known Allergies    No current facility-administered medications on file prior to encounter.     Current Outpatient Medications on File Prior to Encounter   Medication Sig    amLODIPine (NORVASC) 10 MG tablet Take 1 tablet  (10 mg total) by mouth once daily.    aspirin 81 MG Chew Take 1 tablet (81 mg total) by mouth once daily.    cholecalciferol, vitamin D3, (VITAMIN D3) 50 mcg (2,000 unit) Cap Take 1 capsule by mouth once daily.    denosumab (PROLIA) 60 mg/mL Syrg Inject 1 mL (60 mg total) into the skin every 6 (six) months. (Patient not taking: Reported on 8/26/2024)    docosahexanoic acid/vit C/lut (EYE HEALTH ORAL) Take 1 tablet by mouth once daily.    irbesartan-hydrochlorothiazide (AVALIDE) 300-12.5 mg per tablet Take 1 tablet by mouth once daily.    potassium chloride SA (K-DUR,KLOR-CON) 20 MEQ tablet Take 1 tablet (20 mEq total) by mouth 2 (two) times daily. (Patient not taking: Reported on 8/26/2024)    rosuvastatin (CRESTOR) 20 MG tablet Take 1 tablet (20 mg total) by mouth once daily.    tetrahydrozoline HCl/peg (EYE DROPS OPHT) Apply 1 tablet to eye once daily. (Patient not taking: Reported on 8/26/2024)    [DISCONTINUED] carvediloL (COREG) 3.125 MG tablet Take 1 tablet (3.125 mg total) by mouth 2 (two) times daily.    [DISCONTINUED] ISTALOL 0.5 % DrpD  (Patient not taking: Reported on 8/26/2024)     Family History       Problem Relation (Age of Onset)    Heart disease Mother    Hypertension Father    No Known Problems Sister, Daughter    Stroke Father          Tobacco Use    Smoking status: Never     Passive exposure: Never    Smokeless tobacco: Never   Substance and Sexual Activity    Alcohol use: No    Drug use: Never    Sexual activity: Not Currently     Review of Systems   Unable to perform ROS: Intubated     Objective:     Vital Signs (Most Recent):  Pulse: 68 (09/13/24 1319)  Resp: 12 (09/13/24 1319)  BP: (!) 143/65 (09/13/24 1319)  SpO2: 98 % (09/13/24 1319) Vital Signs (24h Range):  Pulse:  [68-70] 68  Resp:  [0-26] 12  SpO2:  [98 %-100 %] 98 %  BP: (143-146)/(60-65) 143/65     Weight: 65 kg (143 lb 4.8 oz)  Body mass index is 27.08 kg/m².    SpO2: 98 %       No intake or output data in the 24 hours ending  "09/13/24 1353    Lines/Drains/Airways       Drain  Duration                  NG/OG Tube 09/13/24 1318 [place] sump 18 Fr. Center mouth <1 day              Airway  Duration                  Airway - Non-Surgical 09/13/24 1304 Endotracheal Tube <1 day              Peripheral Intravenous Line  Duration                  Peripheral IV - Single Lumen 09/13/24 1300 18 G Left Forearm <1 day         Peripheral IV - Single Lumen 09/13/24 1306 18 G Left Antecubital <1 day         Peripheral IV - Single Lumen 09/13/24 18 G Right Antecubital <1 day                     Physical Exam  Constitutional:       Appearance: She is ill-appearing. She is not diaphoretic.      Interventions: She is intubated.   HENT:      Head: Atraumatic.   Eyes:      General:         Right eye: No discharge.         Left eye: No discharge.   Cardiovascular:      Rate and Rhythm: Bradycardia present.      Heart sounds: Murmur heard.   Pulmonary:      Effort: She is intubated.   Abdominal:      General: Bowel sounds are normal.      Palpations: Abdomen is soft.   Musculoskeletal:      Right lower leg: No edema.      Left lower leg: No edema.   Skin:     General: Skin is warm and dry.          Significant Labs: ABG:   Recent Labs   Lab 09/13/24  1323   PH 7.379   PCO2 30.6*   HCO3 18.0*   POCSATURATED >100.0*   , BMP: No results for input(s): "GLU", "NA", "K", "CL", "CO2", "BUN", "CREATININE", "CALCIUM", "MG" in the last 48 hours., CMP No results for input(s): "NA", "K", "CL", "CO2", "GLU", "BUN", "CREATININE", "CALCIUM", "PROT", "ALBUMIN", "BILITOT", "ALKPHOS", "AST", "ALT", "ANIONGAP", "ESTGFRAFRICA", "EGFRNONAA" in the last 48 hours., CBC   Recent Labs   Lab 09/13/24  1342   WBC 13.98*   HGB 10.1*   HCT 30.3*      , INR No results for input(s): "INR", "PROTIME" in the last 48 hours., Lipid Panel No results for input(s): "CHOL", "HDL", "LDLCALC", "TRIG", "CHOLHDL" in the last 48 hours., Troponin No results for input(s): "TROPONINI" in the last 48 " hours., and All pertinent lab results from the last 24 hours have been reviewed.    Significant Imaging: Echocardiogram: Transthoracic echo (TTE) complete (Cupid Only): No results found for this or any previous visit.  Assessment and Plan:     Complete heart block  Patient presented with CHB- rate 30s  Currently TCPd  Labs and TTE pending   BB discontinued- washout period 48-72 hours  TVP to be placed today   Intubated and sedated  Admit to medicine, cardiology consult  Will evaluate for PPM once BB washout complete and review of labs for reversible causes  Further recs to follow       Diastolic dysfunction  Last TTE 2018    1 - Normal left ventricular systolic function (EF 60-65%).     2 - Impaired LV relaxation, increased LVEDP.     3 - Normal right ventricular systolic function .     4 - The estimated PA systolic pressure is 40 mmHg.     Repeat TTE once TVP placed- pacer pads in place with TCP in progress   Euvolemic on exam  DCd BB    Hypertensive kidney disease with stage 3b chronic kidney disease  Hypotensive on admission with BP 80s/40s  Hold home antihypertensives  Avoid AV katharina blockers   Labs pending   Baseline Cr 1-1.4  Avoid nephrotoxic agents         VTE Risk Mitigation (From admission, onward)      None            Thank you for your consult. I will follow-up with patient. Please contact us if you have any additional questions.    Jesse Vanegas NP  Cardiology   [place] - Cath Lab ([facility])

## 2024-09-13 NOTE — Clinical Note
Phone report was given to KAUSHIK Uriostegui for . Pt. Transported to ICU with continuous cardiac monitor and RT.

## 2024-09-13 NOTE — H&P
INTERVENTIONAL CARDIOLOGY  PRE CATH NOTE     SUBJECTIVE:     History of Present Illness:  83F hx of HTN, HLD, Alzheimer's w/ lethargy per daughter, found to be in CHB on EMS arrival s/p cutaneous pacing and intubation referred for cath lab for emergent TVP.      Review of patient's allergies indicates:  No Known Allergies    Past Medical History:   Diagnosis Date    Hyperlipidemia     Hypertension     Increased glucose level     risk of diabetes    Macular degeneration (senile) of retina, unspecified     Osteoporosis, post-menopausal      Past Surgical History:   Procedure Laterality Date    BREAST BIOPSY      benign     Family History   Problem Relation Name Age of Onset    Heart disease Mother      Hypertension Father      Stroke Father      No Known Problems Sister 2     No Known Problems Daughter 1      Social History     Tobacco Use    Smoking status: Never     Passive exposure: Never    Smokeless tobacco: Never   Substance Use Topics    Alcohol use: No    Drug use: Never        Review of Systems:  Comprehensive ROS performed and negative unless noted above.     OBJECTIVE:   BP (!) 143/65   Pulse 68   Resp 12   Wt 65 kg (143 lb 4.8 oz)   SpO2 98%   BMI 27.08 kg/m²   Physical Examination:  Constitutional:intubated   HEENT: MMM,   Cardiovascular: RRR, no chest tenderness to palpation, 2+ radial pulses b/l  Pulmonary: vent sounds  Abdominal: S/NT/ND  Musculoskeletal: No lower extremity edema noted  Neurological: Alert & oriented to self, location, time and situation. No gross neurological deficits  Skin: W/D/I    Laboratory:  Personally Reviewed  Diagnostic Results:  ECG: Reviewed      ASSESSMENT/PLAN:     83F hx of HTN, HLD, Alzheimer's w/ lethargy per daughter, found to be in CHB on EMS arrival s/p cutaneous pacing and intubation referred for cath lab for emergent TVP.      Consent: Emergency consent obtained      Bre Melgar M.D.  Interventional Cardiology   [facility]

## 2024-09-13 NOTE — ASSESSMENT & PLAN NOTE
Patient presented with CHB- rate 30s  Currently TCPd  Labs and TTE pending   BB discontinued- washout period 48-72 hours  TVP to be placed today   Intubated and sedated  Admit to medicine, cardiology consult  Will evaluate for PPM once BB washout complete and review of labs for reversible causes  Further recs to follow

## 2024-09-13 NOTE — CONSULTS
Consult Note  [facility]    ICU Attending: Papi Wells MD  Fellow: Viraj Hunt MD  Admit Date: 9/13/2024  Today's Date: 09/13/2024  Reason for Consult:  Admitted to ICU post emergent TVP, mechanically intubated and sedated    SUBJECTIVE:     HPI: Pt intubated and sedated, unable to provide any HPI. Per chart review:   83F hx of HTN, HLD, Alzheimer's w/ lethargy per daughter, found to be unresponsive by pt's daughter. In complete heart block per EMS upon arrival. Hypotensive and bradycardic. Intubated in the ED and taken to the cath lab for emergent TVP.   Now admitted to the ICU s/p cutaneous pacing placement, intubated and sedated.     Review of patient's allergies indicates:  No Known Allergies    Past Medical History:   Diagnosis Date    Hyperlipidemia     Hypertension     Increased glucose level     risk of diabetes    Macular degeneration (senile) of retina, unspecified     Osteoporosis, post-menopausal      Past Surgical History:   Procedure Laterality Date    BREAST BIOPSY      benign     Family History   Problem Relation Name Age of Onset    Heart disease Mother      Hypertension Father      Stroke Father      No Known Problems Sister 2     No Known Problems Daughter 1      Social History     Tobacco Use    Smoking status: Never     Passive exposure: Never    Smokeless tobacco: Never   Substance Use Topics    Alcohol use: No    Drug use: Never       All medications reviewed.    ROS  Pt unable to participate in review of systems.     OBJECTIVE:     Vital Signs Trends/Hx Reviewed  Vitals:    09/13/24 1615 09/13/24 1630 09/13/24 1645 09/13/24 1700   BP: (!) 143/63 (!) 107/52 (!) 113/56 (!) 118/58   BP Location:    Left leg   Patient Position:    Lying   Pulse: 60 60 60 60   Resp: (!) 24 (!) 24 (!) 21 (!) 24   Temp:       TempSrc:       SpO2: 100% 100% 100% 100%   Weight:       Height:           Ventilator settings:   Vent Mode: A/CMV-VC  Oxygen Concentration (%):  []  40  Resp Rate Total:  [24 br/min-27 br/min] 24 br/min  Vt Set:  [380 mL] 380 mL  PEEP/CPAP:  [4.9 cmH20-5.2 cmH20] 4.9 cmH20  Mean Airway Pressure:  [9.7 dwW52-37.2 cmH20] 11.2 cmH20     Physical Exam:  General: intubated and sedated  HEENT: AT/NC, pupillary ligh reflex intact, oral and nasal mucosa moist.   Neck: Supple without JVD or palpable LAD.   Cardiac: normal rate, regular rhythm, symmetric pulses in distal extremities.  Respiratory/Chest: Mechanical breath sounds. Padding in place on R chest over TVP.  Abdomen: Soft, NT/ND. +BS.  Extremities: No edema.   Neuro: Intubated and sedated, unable to assess.    Laboratory:  Recent Labs   Lab 09/13/24  1323   PH 7.379   PCO2 30.6*   PO2 444*   HCO3 18.0*   POCSATURATED >100.0*     Recent Labs   Lab 09/13/24  1342   WBC 13.98*   RBC 3.39*   HGB 10.1*   HCT 30.3*      MCV 89   MCH 29.8   MCHC 33.3     Recent Labs   Lab 09/13/24  1342      K 4.5   *   CO2 17*   BUN 41*   CREATININE 2.3*   CALCIUM 8.8   MG 1.9       Microbiology Data:   Microbiology Results (last 7 days)       ** No results found for the last 168 hours. **             Chest Imaging:   No new imaging.     Infusions:     0.9% NaCl   Intravenous Continuous        DOPamine  5 mcg/kg/min Intravenous Continuous   Stopped at 09/13/24 1322    propofoL  0-50 mcg/kg/min Intravenous Continuous 15 mL/hr at 09/13/24 1701 50 mcg/kg/min at 09/13/24 1701       Scheduled Medications:    propofoL        propofol  10 mg Intravenous Once       PRN Medications:     Current Facility-Administered Medications:     acetaminophen, 650 mg, Oral, Q4H PRN    fentaNYL, 25 mcg, Intravenous, Q1H PRN    ondansetron, 8 mg, Oral, Q8H PRN    propofoL, , ,     Assessment & Plan:   Patient Active Problem List   Diagnosis    Hypertensive kidney disease with stage 3b chronic kidney disease    Hyperlipidemia    Osteoporosis, post-menopausal    Cyst of breast    Hypokalemia    Left ventricular hypertrophy due to hypertensive  disease    Diastolic dysfunction    Hypercalcemia    Exudative age-related macular degeneration of both eyes with active choroidal neovascularization    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    Resting tremor    B12 deficiency    Abnormal fasting glucose    Complete heart block       ASSESSMENT & RECOMMENDATIONS     CNS/Neuro:  - Intubated and sedated with propofol gtt and Fentanyl PRN for pain/agitation  - Daily SAT/SBT, may be appropriate for extubation tomorrow    Cardiovascular:  3rd Degree AV Block  S/p Transvenous Cardiac Pacing  Essential Hypertension  Hyperlipidemia  - Pt found with HR in 30s and SBP in 80s per EMS  - Pt taken emergently to cath lab on 9/13 for temporary transvenous pacemaker insertion   - Pt currently hemodynamically stable, resolved bradycardia and hypotension  - Holding home Amlodipine, Carvedilol, Irbesartan-HCTZ, and ASA  - , Troponin 0.070 -> 0.224  - TTE (9/13) significant for hyperdynamic systolic function (EF 77%), moderate LVOT obstruction, and moderate MVR    Respiratory:  Intubated and Mechanically Ventilated  - Pt intubated and sedated post-procedure  - Current vent settings:  Vent Mode: A/CMV-VC  Oxygen Concentration (%):  [] 40  Resp Rate Total:  [24 br/min-27 br/min] 24 br/min  Vt Set:  [380 mL] 380 mL  PEEP/CPAP:  [4.9 cmH20-5.2 cmH20] 4.9 cmH20  Mean Airway Pressure:  [9.7 biM77-74.2 cmH20] 11.2 cmH20   - ABG post-intubation (9/13): pH 7.38, PCO2 31, PO2 444  - Daily SBT/SAT      GI/Metabolic:  GI Ppx: Anticipate extubation <72 hrs  Diet: NPO    Renal:  Acute Kidney Injury  - Cr of 2.3 upon admission, baseline 0.8  - Monitor with daily CMP, pt may benefit from IVF resuscitation  - Renally dose meds, avoid nephrotoxic agents    Heme:  DVT Ppx: Heparin TID    Endo:   - A1c last measured (08/2024): 5.7%  - TSH wnl this admission    MSK:  Osteoporosis  - Previous DEXA scan showed T score of -2.7  - Holding home Denosumab and supplemental vitamin D      ID:  - No concerns for infection at this time, will monitor for post-operative fevers    Code Status: Full code    Disposition: Admitted to the ICU, intubated and sedated     Critical Care Daily Checklist:    A: Awake: RASS Goal/Actual Goal: -1 to 0  Actual: William Agitation Sedation Scale (RASS): -2   B: Spontaneous Breathing Trial Performed? Tomorrow   C: SAT & SBT Coordinated?  Tomorrow                      E: Early Mobility Performed? When appropriate   F: Feeding Goal: Cardiac diet  Status: NPO     AS: Analgesia/Sedation Propofol and Fentanyl   T: Thromboembolic Prophylaxis Heparin   H: HOB > 300 Yes   U: Stress Ulcer Prophylaxis (if needed) Will add if not extubated tomorrow   G: Glucose Control Goal: 140-180   B: Bowel Function Stool Occurrence: unknown ; Regimen: will add as needed   I: Indwelling Catheter (Lines & Ríos) Necessity RIJ CVC, PIVs   D: De-escalation of Antimicrobials/Pharmacotherapies N/A    Plan for the day/ETD Monitor vitals signs, wean sedation and ventilation    Code Status:  Family/Goals of Care: Full code  Lisa Milligan, daughter, has POA to make medical decisions on behalf of the pt     Thank you for allowing us to participate in the care of this patient. We will continue to follow. Please call with questions.    Luke Campo,   [facility], HO-2  [facility]

## 2024-09-13 NOTE — ED PROVIDER NOTES
Encounter Date: 9/13/2024       History     Chief Complaint   Patient presents with    Heart block     Arrives via ems from home. Family reports V/D today with worsening lethargy. Accucheck-184 per ems. Pt. Noted to be in 3rd degree HB with rate-30's, sbp-80. Pacing initiated by ems.      Trudy Horvath is a 83 y.o. female who  has a past medical history of Hyperlipidemia, Hypertension, Increased glucose level, Macular degeneration (senile) of retina, unspecified, and Osteoporosis, post-menopausal.    The patient presents to the ED due to lethargy and unresponsiveness.  Patient was brought in by ambulance who reported that patient was in high-degree heart block.  Her initial blood pressures were 80s over 40s.  They transcutaneously paced her and due to agitation gave her a total of 5 mg of IM Versed.  Patient was brought in unresponsive being bag-valve mask.  Initial blood pressure was 146/60 on transcutaneous pacing.    The history is provided by the patient.     Review of patient's allergies indicates:  No Known Allergies  Past Medical History:   Diagnosis Date    Hyperlipidemia     Hypertension     Increased glucose level     risk of diabetes    Macular degeneration (senile) of retina, unspecified     Osteoporosis, post-menopausal      Past Surgical History:   Procedure Laterality Date    BREAST BIOPSY      benign     Family History   Problem Relation Name Age of Onset    Heart disease Mother      Hypertension Father      Stroke Father      No Known Problems Sister 2     No Known Problems Daughter 1      Social History     Tobacco Use    Smoking status: Never     Passive exposure: Never    Smokeless tobacco: Never   Substance Use Topics    Alcohol use: No    Drug use: Never     Review of Systems   Unable to perform ROS: Acuity of condition       Physical Exam     Initial Vitals   BP Pulse Resp Temp SpO2   09/13/24 1300 09/13/24 1309 09/13/24 1300 09/13/24 1501 09/13/24 1309   (!) 146/60 70 (!) 26 97.8 °F (36.6 °C)  100 %      MAP       --                Physical Exam    Constitutional: No distress.   HENT:   Head: Normocephalic and atraumatic.   Eyes: Conjunctivae are normal.   Cardiovascular:  Intact distal pulses.           No murmur heard.  Transcutaneous paced   Pulmonary/Chest: Breath sounds normal. No respiratory distress.   Abdominal: She exhibits no distension. There is no abdominal tenderness. There is no rebound.   Musculoskeletal:         General: No edema.     Neurological: She is alert.   Unresponsive   Skin: Skin is warm and dry.   Psychiatric: She has a normal mood and affect.         ED Course   Intubation    Date/Time: 9/13/2024 1:25 PM  Location procedure was performed: [facility] EMERGENCY DEPARTMENT    Performed by: Herbie Rubi Jr., MD  Authorized by: Herbie Rubi Jr., MD  Consent Done: Emergent Situation  Indications: respiratory distress and airway protection  Intubation method: video-assisted  Patient status: paralyzed (RSI)  Preoxygenation: BVM  Sedatives: etomidate  Paralytic: rocuronium  Laryngoscope size: Glide 3  Tube size: 7.5 mm  Tube type: cuffed  Number of attempts: 1  Cricoid pressure: yes  Cords visualized: yes  Post-procedure assessment: chest rise and ETCO2 monitor  Breath sounds: equal and absent over the epigastrium  ETT to teeth: 23 cm  Tube secured with: ETT kaiser        Labs Reviewed   CBC W/ AUTO DIFFERENTIAL - Abnormal       Result Value    WBC 13.98 (*)     RBC 3.39 (*)     Hemoglobin 10.1 (*)     Hematocrit 30.3 (*)     MCV 89      MCH 29.8      MCHC 33.3      RDW 13.6      Platelets 278      MPV 10.1      Immature Granulocytes 0.9 (*)     Gran # (ANC) 11.9 (*)     Immature Grans (Abs) 0.12 (*)     Lymph # 1.4      Mono # 0.6      Eos # 0.0      Baso # 0.03      nRBC 0      Gran % 84.7 (*)     Lymph % 9.9 (*)     Mono % 4.3      Eosinophil % 0.0      Basophil % 0.2      Differential Method Automated     B-TYPE NATRIURETIC PEPTIDE   TSH          Imaging Results              XR  NG/OG tube placement check, non-radiologist performed (Final result)  Result time 09/13/24 13:49:16      Final result by Danny Mendoza III, MD (09/13/24 13:49:16)                   Impression:      No acute process seen.      Electronically signed by: Danny Mendoza MD  Date:    09/13/2024  Time:    13:49               Narrative:    EXAMINATION:  XR NG/OG TUBE PLACEMENT CHECK, NON-RADIOLOGIST PERFORMED    CLINICAL HISTORY:  og tube placement;    FINDINGS:  ET tip T 4, NG tip fundus.  Heart size is normal.  There is aortic plaque and DJD.  Lungs are clear.  There is a dextroconvex curvature of the spine.                                       X-Ray Chest 1 View (Final result)  Result time 09/13/24 13:48:40      Final result by Danny Mendoza III, MD (09/13/24 13:48:40)                   Impression:      No acute process seen.      Electronically signed by: Danny Mendoza MD  Date:    09/13/2024  Time:    13:48               Narrative:    EXAMINATION:  XR CHEST 1 VIEW    CLINICAL HISTORY:  Presence of other specified devices    FINDINGS:  ET tip T3, NG tip fundus.  Heart size is normal.  There is a dextroconvex curvature of the spine.  Lungs are clear.                                       Medications   DOPamine 400 mg in dextrose 5 % 250 mL infusion (premix) (0 mcg/kg/min × 50 kg Intravenous Stopped 9/13/24 1322)   propofol (DIPRIVAN) 10 mg/mL infusion (50 mcg/kg/min × 50 kg Intravenous New Bag 9/13/24 1538)   propofoL (DIPRIVAN) 10 mg/mL infusion (has no administration in time range)   propofol (DIPRIVAN) 10 mg/mL IVP (has no administration in time range)   acetaminophen tablet 650 mg (has no administration in time range)   ondansetron disintegrating tablet 8 mg (has no administration in time range)   0.9%  NaCl infusion (has no administration in time range)   etomidate (AMIDATE) 2 mg/mL injection (20 mg  Given 9/13/24 1302)   rocuronium 10 mg/mL injection (70 mg  Given 9/13/24 1302)     Medical Decision  Making  Differential diagnosis includes but is not limited to:  Arrhythmia, electrolyte abnormality, thyroid disorder, medication effect, ACS        Amount and/or Complexity of Data Reviewed  Labs: ordered.  Radiology: ordered.    Risk  Prescription drug management.  Decision regarding hospitalization.  Risk Details: Patient with lethargy unresponsiveness in the setting of hypotension and third-degree heart block.  Patient was transcutaneously paced, dopamine at bedside all the patient did not require significant amounts of dopamine to maintain blood pressure or heart rate.  She was intubated for airway protection.  Hemodynamically stable.  Discussed case with Dr. Liao who reviewed EKG rhythm strip and concurs this has likely third-degree heart block.  Recommends emergent transvenous pacing.  The cardiology team will assume care.  Labs pending at time of transfer of care.    Critical Care  Total time providing critical care: 30 minutes               ED Course as of 09/13/24 1623   Fri Sep 13, 2024   1326 EKG as seen on the rhythm strip:  Ventricularly estimated to be 30 beats per minute  AV dissociation compatible with third-degree heart block.  Image saved to media [RN]      ED Course User Index  [RN] Herbie Rubi Jr., MD                           Clinical Impression:  Final diagnoses:  [R07.9] Chest pain  [Z97.8] Endotracheally intubated  [I45.9] Heart block          ED Disposition Condition    Admit Stable             Portions of this note were dictated using voice recognition software and may contain dictation related errors in spelling/grammar/syntax not found on text review       Herbie Rubi Jr., MD  09/13/24 9274

## 2024-09-13 NOTE — NURSING
Rapid Response Nurse Follow-up Note     Single lumen 20G, 2.25IN AccuCath placed in the right basilic vein. Needle advanced into the vessel under real time ultrasound guidance.    Max dwell date: 9/23/24   Lot number: [id number]

## 2024-09-14 PROBLEM — N17.9 AKI (ACUTE KIDNEY INJURY): Status: ACTIVE | Noted: 2024-09-14

## 2024-09-14 LAB
ALBUMIN SERPL BCP-MCNC: 3.2 G/DL (ref 3.5–5.2)
ALP SERPL-CCNC: 63 U/L (ref 55–135)
ALT SERPL W/O P-5'-P-CCNC: 20 U/L (ref 10–44)
ANION GAP SERPL CALC-SCNC: 13 MMOL/L (ref 8–16)
AST SERPL-CCNC: 24 U/L (ref 10–40)
BASOPHILS # BLD AUTO: 0.03 K/UL (ref 0–0.2)
BASOPHILS NFR BLD: 0.3 % (ref 0–1.9)
BILIRUB SERPL-MCNC: 0.5 MG/DL (ref 0.1–1)
BUN SERPL-MCNC: 37 MG/DL (ref 8–23)
CALCIUM SERPL-MCNC: 9.1 MG/DL (ref 8.7–10.5)
CHLORIDE SERPL-SCNC: 107 MMOL/L (ref 95–110)
CO2 SERPL-SCNC: 21 MMOL/L (ref 23–29)
CREAT SERPL-MCNC: 1.4 MG/DL (ref 0.5–1.4)
DIFFERENTIAL METHOD BLD: ABNORMAL
EOSINOPHIL # BLD AUTO: 0 K/UL (ref 0–0.5)
EOSINOPHIL NFR BLD: 0.3 % (ref 0–8)
ERYTHROCYTE [DISTWIDTH] IN BLOOD BY AUTOMATED COUNT: 13.6 % (ref 11.5–14.5)
EST. GFR  (NO RACE VARIABLE): 37 ML/MIN/1.73 M^2
GLUCOSE SERPL-MCNC: 101 MG/DL (ref 70–110)
HCT VFR BLD AUTO: 32.2 % (ref 37–48.5)
HGB BLD-MCNC: 11.4 G/DL (ref 12–16)
IMM GRANULOCYTES # BLD AUTO: 0.05 K/UL (ref 0–0.04)
IMM GRANULOCYTES NFR BLD AUTO: 0.4 % (ref 0–0.5)
LYMPHOCYTES # BLD AUTO: 2.4 K/UL (ref 1–4.8)
LYMPHOCYTES NFR BLD: 20.1 % (ref 18–48)
MAGNESIUM SERPL-MCNC: 1.9 MG/DL (ref 1.6–2.6)
MCH RBC QN AUTO: 30.6 PG (ref 27–31)
MCHC RBC AUTO-ENTMCNC: 35.4 G/DL (ref 32–36)
MCV RBC AUTO: 86 FL (ref 82–98)
MONOCYTES # BLD AUTO: 1 K/UL (ref 0.3–1)
MONOCYTES NFR BLD: 8.3 % (ref 4–15)
NEUTROPHILS # BLD AUTO: 8.2 K/UL (ref 1.8–7.7)
NEUTROPHILS NFR BLD: 70.6 % (ref 38–73)
NRBC BLD-RTO: 0 /100 WBC
PLATELET # BLD AUTO: 256 K/UL (ref 150–450)
PMV BLD AUTO: 10.2 FL (ref 9.2–12.9)
POCT GLUCOSE: 107 MG/DL (ref 70–110)
POCT GLUCOSE: 112 MG/DL (ref 70–110)
POCT GLUCOSE: 112 MG/DL (ref 70–110)
POCT GLUCOSE: 115 MG/DL (ref 70–110)
POCT GLUCOSE: 89 MG/DL (ref 70–110)
POTASSIUM SERPL-SCNC: 3.3 MMOL/L (ref 3.5–5.1)
PROT SERPL-MCNC: 7 G/DL (ref 6–8.4)
RBC # BLD AUTO: 3.73 M/UL (ref 4–5.4)
SODIUM SERPL-SCNC: 141 MMOL/L (ref 136–145)
WBC # BLD AUTO: 11.67 K/UL (ref 3.9–12.7)

## 2024-09-14 PROCEDURE — 63600175 PHARM REV CODE 636 W HCPCS: Performed by: STUDENT IN AN ORGANIZED HEALTH CARE EDUCATION/TRAINING PROGRAM

## 2024-09-14 PROCEDURE — 20000000 HC ICU ROOM

## 2024-09-14 PROCEDURE — 93010 ELECTROCARDIOGRAM REPORT: CPT | Mod: ,,, | Performed by: INTERNAL MEDICINE

## 2024-09-14 PROCEDURE — 27200667 HC PACEMAKER, TEMPORARY MONITORING, PER SHIFT

## 2024-09-14 PROCEDURE — 85025 COMPLETE CBC W/AUTO DIFF WBC: CPT

## 2024-09-14 PROCEDURE — 94761 N-INVAS EAR/PLS OXIMETRY MLT: CPT

## 2024-09-14 PROCEDURE — 83735 ASSAY OF MAGNESIUM: CPT

## 2024-09-14 PROCEDURE — 25000003 PHARM REV CODE 250: Performed by: STUDENT IN AN ORGANIZED HEALTH CARE EDUCATION/TRAINING PROGRAM

## 2024-09-14 PROCEDURE — 51798 US URINE CAPACITY MEASURE: CPT

## 2024-09-14 PROCEDURE — 63600175 PHARM REV CODE 636 W HCPCS: Performed by: EMERGENCY MEDICINE

## 2024-09-14 PROCEDURE — 99900026 HC AIRWAY MAINTENANCE (STAT)

## 2024-09-14 PROCEDURE — 99900035 HC TECH TIME PER 15 MIN (STAT)

## 2024-09-14 PROCEDURE — 80053 COMPREHEN METABOLIC PANEL: CPT

## 2024-09-14 PROCEDURE — 94003 VENT MGMT INPAT SUBQ DAY: CPT

## 2024-09-14 PROCEDURE — 99233 SBSQ HOSP IP/OBS HIGH 50: CPT | Mod: ,,, | Performed by: STUDENT IN AN ORGANIZED HEALTH CARE EDUCATION/TRAINING PROGRAM

## 2024-09-14 PROCEDURE — 27000221 HC OXYGEN, UP TO 24 HOURS

## 2024-09-14 PROCEDURE — 25000003 PHARM REV CODE 250: Performed by: INTERNAL MEDICINE

## 2024-09-14 PROCEDURE — 63600175 PHARM REV CODE 636 W HCPCS

## 2024-09-14 PROCEDURE — 27200966 HC CLOSED SUCTION SYSTEM

## 2024-09-14 PROCEDURE — 25000003 PHARM REV CODE 250: Performed by: FAMILY MEDICINE

## 2024-09-14 PROCEDURE — 27100171 HC OXYGEN HIGH FLOW UP TO 24 HOURS

## 2024-09-14 RX ORDER — MAGNESIUM SULFATE HEPTAHYDRATE 40 MG/ML
2 INJECTION, SOLUTION INTRAVENOUS ONCE
Status: COMPLETED | OUTPATIENT
Start: 2024-09-14 | End: 2024-09-14

## 2024-09-14 RX ORDER — POTASSIUM CHLORIDE 20 MEQ/1
20 TABLET, EXTENDED RELEASE ORAL ONCE
Status: COMPLETED | OUTPATIENT
Start: 2024-09-14 | End: 2024-09-14

## 2024-09-14 RX ORDER — POTASSIUM CHLORIDE 7.45 MG/ML
20 INJECTION INTRAVENOUS ONCE
Status: COMPLETED | OUTPATIENT
Start: 2024-09-14 | End: 2024-09-14

## 2024-09-14 RX ORDER — TALC
6 POWDER (GRAM) TOPICAL NIGHTLY PRN
Status: DISCONTINUED | OUTPATIENT
Start: 2024-09-14 | End: 2024-09-19 | Stop reason: HOSPADM

## 2024-09-14 RX ADMIN — ACETAMINOPHEN 650 MG: 325 TABLET ORAL at 10:09

## 2024-09-14 RX ADMIN — PROPOFOL 45 MCG/KG/MIN: 10 INJECTION, EMULSION INTRAVENOUS at 02:09

## 2024-09-14 RX ADMIN — MAGNESIUM SULFATE HEPTAHYDRATE 2 G: 40 INJECTION, SOLUTION INTRAVENOUS at 11:09

## 2024-09-14 RX ADMIN — HEPARIN SODIUM 5000 UNITS: 5000 INJECTION INTRAVENOUS; SUBCUTANEOUS at 09:09

## 2024-09-14 RX ADMIN — HEPARIN SODIUM 5000 UNITS: 5000 INJECTION INTRAVENOUS; SUBCUTANEOUS at 02:09

## 2024-09-14 RX ADMIN — POTASSIUM CHLORIDE 10 MEQ: 7.46 INJECTION, SOLUTION INTRAVENOUS at 08:09

## 2024-09-14 RX ADMIN — HEPARIN SODIUM 5000 UNITS: 5000 INJECTION INTRAVENOUS; SUBCUTANEOUS at 06:09

## 2024-09-14 RX ADMIN — POTASSIUM CHLORIDE 10 MEQ: 7.46 INJECTION, SOLUTION INTRAVENOUS at 09:09

## 2024-09-14 RX ADMIN — FENTANYL CITRATE 25 MCG: 50 INJECTION INTRAMUSCULAR; INTRAVENOUS at 09:09

## 2024-09-14 RX ADMIN — PROPOFOL 40 MCG/KG/MIN: 10 INJECTION, EMULSION INTRAVENOUS at 08:09

## 2024-09-14 RX ADMIN — POTASSIUM CHLORIDE 20 MEQ: 1500 TABLET, EXTENDED RELEASE ORAL at 08:09

## 2024-09-14 RX ADMIN — MELATONIN TAB 3 MG 6 MG: 3 TAB at 10:09

## 2024-09-14 NOTE — ASSESSMENT & PLAN NOTE
Patient with Hypoxic Respiratory failure which is Acute.  she is not on home oxygen. Supplemental oxygen was provided and noted- Vent Mode: A/CMV-VC  Oxygen Concentration (%):  [] 40  Resp Rate Total:  [24 br/min-27 br/min] 24 br/min  Vt Set:  [380 mL] 380 mL  PEEP/CPAP:  [4.9 cmH20-5.2 cmH20] 5 cmH20  Mean Airway Pressure:  [9.5 bbW28-04.2 cmH20] 9.5 cmH20    .   Signs/symptoms of respiratory failure include- respiratory distress. Contributing diagnoses includes -  complete heart block with hypotension  Labs and images were reviewed. Patient Has not had a recent ABG. Will treat underlying causes and adjust management of respiratory failure a

## 2024-09-14 NOTE — PLAN OF CARE
Problem: Adult Inpatient Plan of Care  Goal: Plan of Care Review  Outcome: Progressing  Goal: Absence of Hospital-Acquired Illness or Injury  Outcome: Progressing  Goal: Optimal Comfort and Wellbeing  Outcome: Progressing  Goal: Readiness for Transition of Care  Outcome: Progressing     Problem: Wound  Goal: Optimal Coping  Outcome: Progressing  Goal: Optimal Functional Ability  Outcome: Progressing  Goal: Absence of Infection Signs and Symptoms  Outcome: Progressing  Goal: Improved Oral Intake  Outcome: Progressing  Goal: Optimal Pain Control and Function  Outcome: Progressing  Goal: Skin Health and Integrity  Outcome: Progressing  Goal: Optimal Wound Healing  Outcome: Progressing     Problem: Fall Injury Risk  Goal: Absence of Fall and Fall-Related Injury  Outcome: Progressing     Problem: Restraint, Nonviolent  Goal: Absence of Harm or Injury  Outcome: Progressing     Problem: Skin Injury Risk Increased  Goal: Skin Health and Integrity  Outcome: Progressing     Problem: Mechanical Ventilation Invasive  Goal: Effective Communication  Outcome: Progressing  Goal: Optimal Device Function  Outcome: Progressing  Goal: Mechanical Ventilation Liberation  Outcome: Progressing  Goal: Optimal Nutrition Delivery  Outcome: Progressing  Goal: Absence of Device-Related Skin and Tissue Injury  Outcome: Progressing  Goal: Absence of Ventilator-Induced Lung Injury  Outcome: Progressing     Problem: Infection  Goal: Absence of Infection Signs and Symptoms  Outcome: Progressing

## 2024-09-14 NOTE — PROGRESS NOTES
"[facility] Progress Note        Subjective:     No acute events overnight. Currently paced. SBT/SAT today. Will plan for pacemaker early next week.      Objective:     Last 24 Hour Vital Signs:  BP  Min: 83/43  Max: 171/64  Temp  Av.6 °F (36.4 °C)  Min: 96.7 °F (35.9 °C)  Max: 97.9 °F (36.6 °C)  Pulse  Av  Min: 60  Max: 60  Resp  Av.7  Min: 9  Max: 34  SpO2  Av %  Min: 100 %  Max: 100 %  Height  Av' 1" (154.9 cm)  Min: 5' 1" (154.9 cm)  Max: 5' 1" (154.9 cm)  Weight  Av kg (108 lb 0.2 oz)  Min: 49 kg (108 lb)  Max: 49 kg (108 lb 0.4 oz)  I/O last 3 completed shifts:  In: 429.8 [I.V.:429.8]  Out: 850 [Urine:850]    Physical Examination:  GEN: Comfortable, No distress  HEENT: No JVD. No carotid bruit  Pulm : Intubated on mechanical vent  CVS: TVP in place  ABD: Soft, NT. BS+  Ext: Moves freely.  Neuro: no focal deficits  Psych: Appropriate.    Laboratory:  Laboratory Data Reviewed: yes  Pertinent Findings:      Other Results:  EKG   ECHO:        Current Medications:     Infusions:   0.9% NaCl   Intravenous Continuous 10 mL/hr at 24 1200 Rate Verify at 24 1200    DOPamine  5 mcg/kg/min Intravenous Continuous   Stopped at 24 1322    propofoL  0-50 mcg/kg/min Intravenous Continuous   Stopped at 24 1346        Scheduled:   heparin (porcine)  5,000 Units Subcutaneous Q8H    propofol  10 mg Intravenous Once        PRN:    Current Facility-Administered Medications:     acetaminophen, 650 mg, Oral, Q4H PRN    fentaNYL, 25 mcg, Intravenous, Q1H PRN    ondansetron, 8 mg, Oral, Q8H PRN      Assessment:     Trudy Horvath is a 83 y.o.female with  Patient Active Problem List    Diagnosis Date Noted    ELKE (acute kidney injury) 2024    CHB (complete heart block) 2024    Chest pain 2024    Endotracheally intubated 2024    Temporary transvenous cardiac pacemaker present 2024    Acute respiratory failure with hypoxia and hypercarbia 2024    " Abnormal fasting glucose 04/30/2024    B12 deficiency 02/20/2024    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 Holding AVNB. Will plan for PPM early next week. Rest of care per primary team.     Allan Carter MD  [facility]

## 2024-09-14 NOTE — ASSESSMENT & PLAN NOTE
- see ELKE  BMP reviewed- noted Estimated Creatinine Clearance: 22.2 mL/min (based on SCr of 1.4 mg/dL). according to latest data. Based on current GFR, CKD stage is stage 4 - GFR 15-29.  Monitor UOP and serial BMP and adjust therapy as needed. Renally dose meds. Avoid nephrotoxic medications and procedures.

## 2024-09-14 NOTE — ASSESSMENT & PLAN NOTE
ELKE is likely due to pre-renal azotemia due to intravascular volume depletion. Baseline creatinine is  1.4 . Most recent creatinine and eGFR are listed below.  Recent Labs     09/13/24  1342 09/14/24  0412   CREATININE 2.3* 1.4   EGFRNORACEVR 20* 37*      Plan  - ELKE is improving  - Avoid nephrotoxins and renally dose meds for GFR listed above  - Monitor urine output, serial BMP, and adjust therapy as needed  - suspect 2/2 poor perfusion in the setting of CHB, now improving

## 2024-09-14 NOTE — PROGRESS NOTES
[facility]  Progress Note    Patient Name: Trudy Horvath  MRN: [id number]  Patient Class: IP- Inpatient   Admission Date: 9/13/2024  Length of Stay: 1 days  Attending Physician: Soren Narayan,*  Primary Care Provider: Maria C Guzman A.M., MD        Subjective:     Principal Problem:CHB (complete heart block)        HPI:  83 y.o. female with PMHx significant for Hyperlipidemia, Hypertension, dementia, CKD stage 3 was admitted for complete heart block s/p transvenous pacemaker, intubated on mechanical ventilation. Per daughter at bedside patient was in her usual state of health except for recently diagnosed with dementia. She went to eat with family at restaurant the day prior admission. She woke up with multiple episodes of vomiting diarrhea. Then suddenly she became very weak and was not able to walk. EMS was called, she was found to be in high degree heart block, transcutaneous pacemaker was placed and was transferred to the ER. In the ER she was found to be hypotensive with systolic in the 80s. She was brought to OR and have transvenous pacemaker placement, then transfer to ICU.    Overview/Hospital Course:  No notes on file    Interval History: no events overnight. Plan for SBT/SAT today. Discussed with Critical Care team.    Review of Systems  Objective:     Vital Signs (Most Recent):  Temp: 97.9 °F (36.6 °C) (09/14/24 1130)  Pulse: 60 (09/14/24 1213)  Resp: (!) 22 (09/14/24 1213)  BP: (!) 107/54 (09/14/24 1213)  SpO2: 100 % (09/14/24 1213) Vital Signs (24h Range):  Temp:  [96.7 °F (35.9 °C)-97.9 °F (36.6 °C)] 97.9 °F (36.6 °C)  Pulse:  [60-70] 60  Resp:  [0-34] 22  SpO2:  [98 %-100 %] 100 %  BP: ()/(43-83) 107/54     Weight: 49 kg (108 lb)  Body mass index is 20.41 kg/m².    Intake/Output Summary (Last 24 hours) at 9/14/2024 1257  Last data filed at 9/14/2024 1200  Gross per 24 hour   Intake 852.59 ml   Output 850 ml   Net 2.59 ml         Physical Exam  Vitals and  nursing note reviewed.   HENT:      Head: Normocephalic and atraumatic.      Mouth/Throat:      Mouth: Mucous membranes are moist.   Cardiovascular:      Comments: Pacing with TVP  Pulmonary:      Comments: Intubated on mechanical ventilation  Abdominal:      General: Bowel sounds are normal. There is no distension.      Palpations: Abdomen is soft.      Tenderness: There is no abdominal tenderness. There is no guarding or rebound.   Musculoskeletal:      Right lower leg: No edema.      Left lower leg: No edema.             Significant Labs: All pertinent labs within the past 24 hours have been reviewed.    Significant Imaging: I have reviewed all pertinent imaging results/findings within the past 24 hours.    Assessment/Plan:      * CHB (complete heart block)  S/p TVP  - hold BB for now, may need PPM but awaiting washout period  - monitor in ICU  - Cardiology following      ELKE (acute kidney injury)  ELKE is likely due to pre-renal azotemia due to intravascular volume depletion. Baseline creatinine is  1.4 . Most recent creatinine and eGFR are listed below.  Recent Labs     09/13/24  1342 09/14/24  0412   CREATININE 2.3* 1.4   EGFRNORACEVR 20* 37*      Plan  - ELKE is improving  - Avoid nephrotoxins and renally dose meds for GFR listed above  - Monitor urine output, serial BMP, and adjust therapy as needed  - suspect 2/2 poor perfusion in the setting of CHB, now improving    Acute respiratory failure with hypoxia and hypercarbia  Patient with Hypoxic Respiratory failure which is Acute.  she is not on home oxygen. Supplemental oxygen was provided and noted- Vent Mode: A/CMV-VC  Oxygen Concentration (%):  [] 40  Resp Rate Total:  [24 br/min-27 br/min] 24 br/min  Vt Set:  [380 mL] 380 mL  PEEP/CPAP:  [4.9 cmH20-5.2 cmH20] 5 cmH20  Mean Airway Pressure:  [9.5 wdV30-26.2 cmH20] 9.5 cmH20    .   Signs/symptoms of respiratory failure include- respiratory distress. Contributing diagnoses includes -  complete heart  block with hypotension  Labs and images were reviewed. Patient Has not had a recent ABG. Will treat underlying causes and adjust management of respiratory failure a    Temporary transvenous cardiac pacemaker present  - noted      Endotracheally intubated  - airway protection/sedation      Hypertensive kidney disease with stage 3b chronic kidney disease  - see ELKE  BMP reviewed- noted Estimated Creatinine Clearance: 22.2 mL/min (based on SCr of 1.4 mg/dL). according to latest data. Based on current GFR, CKD stage is stage 4 - GFR 15-29.  Monitor UOP and serial BMP and adjust therapy as needed. Renally dose meds. Avoid nephrotoxic medications and procedures.      VTE Risk Mitigation (From admission, onward)           Ordered     heparin (porcine) injection 5,000 Units  Every 8 hours         09/13/24 3973                    Discharge Planning   LARISSA:      Code Status: Not on file   Is the patient medically ready for discharge?:     Reason for patient still in hospital (select all that apply): Patient unstable               Critical care time spent on the evaluation and treatment of severe organ dysfunction, review of pertinent labs and imaging studies, discussions with consulting providers and discussions with patient/family: 35 minutes.      Soren Narayan MD  Department of Hospital Medicine   [facility] - Intensive Care

## 2024-09-14 NOTE — SUBJECTIVE & OBJECTIVE
Past Medical History:   Diagnosis Date    Hyperlipidemia     Hypertension     Increased glucose level     risk of diabetes    Macular degeneration (senile) of retina, unspecified     Osteoporosis, post-menopausal        Past Surgical History:   Procedure Laterality Date    BREAST BIOPSY      benign       Review of patient's allergies indicates:  No Known Allergies    No current facility-administered medications on file prior to encounter.     Current Outpatient Medications on File Prior to Encounter   Medication Sig    amLODIPine (NORVASC) 10 MG tablet Take 1 tablet (10 mg total) by mouth once daily.    aspirin 81 MG Chew Take 1 tablet (81 mg total) by mouth once daily.    cholecalciferol, vitamin D3, (VITAMIN D3) 50 mcg (2,000 unit) Cap Take 1 capsule by mouth once daily.    denosumab (PROLIA) 60 mg/mL Syrg Inject 1 mL (60 mg total) into the skin every 6 (six) months. (Patient not taking: Reported on 8/26/2024)    docosahexanoic acid/vit C/lut (EYE HEALTH ORAL) Take 1 tablet by mouth once daily.    irbesartan-hydrochlorothiazide (AVALIDE) 300-12.5 mg per tablet Take 1 tablet by mouth once daily.    potassium chloride SA (K-DUR,KLOR-CON) 20 MEQ tablet Take 1 tablet (20 mEq total) by mouth 2 (two) times daily. (Patient not taking: Reported on 8/26/2024)    rosuvastatin (CRESTOR) 20 MG tablet Take 1 tablet (20 mg total) by mouth once daily.    tetrahydrozoline HCl/peg (EYE DROPS OPHT) Apply 1 tablet to eye once daily. (Patient not taking: Reported on 8/26/2024)    [DISCONTINUED] carvediloL (COREG) 3.125 MG tablet Take 1 tablet (3.125 mg total) by mouth 2 (two) times daily.    [DISCONTINUED] ISTALOL 0.5 % DrpD  (Patient not taking: Reported on 8/26/2024)     Family History       Problem Relation (Age of Onset)    Heart disease Mother    Hypertension Father    No Known Problems Sister, Daughter    Stroke Father          Tobacco Use    Smoking status: Never     Passive exposure: Never    Smokeless tobacco: Never  "  Substance and Sexual Activity    Alcohol use: No    Drug use: Never    Sexual activity: Not Currently     Review of Systems   Unable to perform ROS: Acuity of condition     Objective:     Vital Signs (Most Recent):  Temp: 97 °F (36.1 °C) (09/13/24 1930)  Pulse: 60 (09/13/24 2138)  Resp: (!) 24 (09/13/24 2138)  BP: (!) 153/63 (09/13/24 2138)  SpO2: 100 % (09/13/24 2138) Vital Signs (24h Range):  Temp:  [97 °F (36.1 °C)-97.9 °F (36.6 °C)] 97 °F (36.1 °C)  Pulse:  [60-70] 60  Resp:  [0-31] 24  SpO2:  [98 %-100 %] 100 %  BP: (104-168)/(52-69) 153/63     Weight: 49 kg (108 lb)  Body mass index is 20.41 kg/m².     Physical Exam  Vitals and nursing note reviewed.   HENT:      Head: Normocephalic and atraumatic.      Mouth/Throat:      Mouth: Mucous membranes are moist.   Cardiovascular:      Comments: Pacing with TVP  Pulmonary:      Comments: Intubated on mechanical ventilation  Abdominal:      General: Bowel sounds are normal. There is no distension.      Palpations: Abdomen is soft.      Tenderness: There is no abdominal tenderness. There is no guarding or rebound.   Musculoskeletal:      Right lower leg: No edema.      Left lower leg: No edema.                Significant Labs: All pertinent labs within the past 24 hours have been reviewed.  A1C:   Recent Labs   Lab 08/26/24  0844   HGBA1C 5.7*     BMP:   Recent Labs   Lab 09/13/24  1342   *      K 4.5   *   CO2 17*   BUN 41*   CREATININE 2.3*   CALCIUM 8.8   MG 1.9     CBC:   Recent Labs   Lab 09/13/24  1342   WBC 13.98*   HGB 10.1*   HCT 30.3*        CMP:   Recent Labs   Lab 09/13/24  1342      K 4.5   *   CO2 17*   *   BUN 41*   CREATININE 2.3*   CALCIUM 8.8   PROT 6.1   ALBUMIN 3.1*   BILITOT 0.5   ALKPHOS 55   AST 19   ALT 15   ANIONGAP 15     Cardiac Markers:   Recent Labs   Lab 09/13/24  1342   *     Coagulation: No results for input(s): "PT", "INR", "APTT" in the last 48 hours.  Lactic Acid: No results for " "input(s): "LACTATE" in the last 48 hours.  Lipid Panel: No results for input(s): "CHOL", "HDL", "LDLCALC", "TRIG", "CHOLHDL" in the last 48 hours.  Respiratory Culture: No results for input(s): "GSRESP", "RESPIRATORYC" in the last 48 hours.  Troponin:   Recent Labs   Lab 09/13/24  1342 09/13/24  1729   TROPONINI 0.070* 0.224*     TSH:   Recent Labs   Lab 09/13/24  1342   TSH 3.861     Urine Culture: No results for input(s): "LABURIN" in the last 48 hours.  Urine Studies: No results for input(s): "COLORU", "APPEARANCEUA", "PHUR", "SPECGRAV", "PROTEINUA", "GLUCUA", "KETONESU", "BILIRUBINUA", "OCCULTUA", "NITRITE", "UROBILINOGEN", "LEUKOCYTESUR", "RBCUA", "WBCUA", "BACTERIA", "SQUAMEPITHEL", "HYALINECASTS" in the last 48 hours.    Invalid input(s): "WRIGHTSUR"  Recent Lab Results  (Last 5 results in the past 24 hours)        09/13/24  1740   09/13/24  1731   09/13/24  1729   09/13/24  1726   09/13/24  1342        A2C EF       79         A4C EF       76         Albumin         3.1       ALP         55       ALT         15       Anion Gap         15       Ascending aorta       2.63         Ao peak fercho       2.23         Ao VTI       48.90         AST         19       AV valve area       5.83         WALLY by Velocity Ratio       6.04         AV mean gradient       11         AV index (prosthetic)       2.27         AV peak gradient       20         AV Velocity Ratio       2.35         Baso #         0.03       Basophil %         0.2       BILIRUBIN TOTAL         0.5  Comment: For infants and newborns, interpretation of results should be based  on gestational age, weight and in agreement with clinical  observations.    Premature Infant recommended reference ranges:  Up to 24 hours.............<8.0 mg/dL  Up to 48 hours............<12.0 mg/dL  3-5 days..................<15.0 mg/dL  6-29 days.................<15.0 mg/dL         BNP         603  Comment: Values of less than 100 pg/ml are consistent with non-CHF populations.    "    BSA       1.45         BUN         41       Calcium         8.8       Chloride         111       CO2         17       Creatinine         2.3       Left Ventricle Relative Wall Thickness       0.63         Differential Method         Automated       E/A ratio       0.86         E/E' ratio       41.67         eGFR         20       Eos #         0.0       Eos %         0.0       E wave deceleration time       332.08         FS       37         Glucose         172       Gran # (ANC)         11.9       Gran %         84.7       Hematocrit         30.3       Hemoglobin         10.1       Mitral Valve Heart Rate       60         Immature Grans (Abs)         0.12  Comment: Mild elevation in immature granulocytes is non specific and   can be seen in a variety of conditions including stress response,   acute inflammation, trauma and pregnancy. Correlation with other   laboratory and clinical findings is essential.         Immature Granulocytes         0.9       IVC diameter       2.23         IVSd       1.16         LA area A2C       15.48         LA area A4C       17.96         LA size       3.10         LA volume       44.99         LA Volume Index (Mod)       31.0         LVOT area       2.6         LV LATERAL E/E' RATIO       41.67         LV SEPTAL E/E' RATIO       41.67         LV EDV BP       39.57         LV Diastolic Volume Index       27.29         Left Ventricular End Diastolic Volume by Teichholz Method       39.57         LV EDV A2C       [id number]         LV EDV A4C       44.65         Left Ventricular End Systolic Volume by Teichholz Method       12.26         LV ESV A2C       37.28         LV ESV A4C       51.10         LVIDd       3.15         LVIDs       1.97         LV mass       98.46         LV Mass Index       68         Left Ventricular Outflow Tract Mean Gradient       63.13         Left Ventricular Outflow Tract Mean Velocity       3.73         LVOT diameter       1.81         LVOT peak  "gerardo       5.24         LVOT stroke volume       284.95         LVOT peak VTI       110.80         LV ESV BP       12.26         LV Systolic Volume Index       8.5         Lymph #         1.4       Lymph %         9.9       Magnesium          1.9       MCH         29.8       MCHC         33.3       MCV         89       Mean e'       0.03         Mono #         0.6       Mono %         4.3       MPV         10.1       Mr max gerardo       5.68         MR PISA EROA       0.28         MV valve area p 1/2 method       2.28         MV "A" wave duration       [id number]         MV valve area by continuity eq       6.26         MV mean gradient       5         MV peak gradient       8         MV Peak A Gerardo       1.45         MV Peak E Gerardo       1.25         MV stenosis pressure 1/2 time       96.30         MV VTI       45.5         nRBC         0       Taylor's Biplane MOD Ejection Fraction       77         Radius       0.64         Platelet Count         278       POC Glucose 142               POCT Glucose   142             Potassium         4.5       PROTEIN TOTAL         6.1       Pulmonary Valve Mean Velocity       0.83         PV peak gradient       7         PV PEAK VELOCITY       1.33         Posterior Wall       0.99         Right Atrium Volume Systolic       22.53         RA area       10.3         RA area length vol       20.93         RBC         3.39       RDW         13.6       RV S'       13.38         Sinus       2.77         Sodium         143       STJ       2.53         TAPSE       2.17         TDI SEPTAL       0.03         TDI LATERAL       0.03         Triscuspid Valve Regurgitation Peak Gradient       41         TR Max Gerardo       3.19         Troponin I     0.224  Comment: The reference interval for Troponin I represents the 99th percentile   cutoff   for our facility and is consistent with 3rd generation assay   performance.       0.070  Comment: The reference interval for Troponin I represents " the 99th percentile   cutoff   for our facility and is consistent with 3rd generation assay   performance.         TSH         3.861       Vn Nyquist       0.61         Vn Nyquist MS       0.61         WBC         13.98       ZLVIDD       -3.31         ZLVIDS       -2.56                                Significant Imaging: I have reviewed all pertinent imaging results/findings within the past 24 hours.

## 2024-09-14 NOTE — HPI
83 y.o. female with PMHx significant for Hyperlipidemia, Hypertension, dementia, CKD stage 3 was admitted for complete heart block s/p transvenous pacemaker, intubated on mechanical ventilation. Per daughter at bedside patient was in her usual state of health except for recently diagnosed with dementia. She went to eat with family at restaurant the day prior admission. She woke up with multiple episodes of vomiting diarrhea. Then suddenly she became very weak and was not able to walk. EMS was called, she was found to be in high degree heart block, transcutaneous pacemaker was placed and was transferred to the ER. In the ER she was found to be hypotensive with systolic in the 80s. She was brought to OR and have transvenous pacemaker placement, then transfer to ICU.

## 2024-09-14 NOTE — SUBJECTIVE & OBJECTIVE
Interval History: no events overnight. Plan for SBT/SAT today. Discussed with Critical Care team.    Review of Systems  Objective:     Vital Signs (Most Recent):  Temp: 97.9 °F (36.6 °C) (09/14/24 1130)  Pulse: 60 (09/14/24 1213)  Resp: (!) 22 (09/14/24 1213)  BP: (!) 107/54 (09/14/24 1213)  SpO2: 100 % (09/14/24 1213) Vital Signs (24h Range):  Temp:  [96.7 °F (35.9 °C)-97.9 °F (36.6 °C)] 97.9 °F (36.6 °C)  Pulse:  [60-70] 60  Resp:  [0-34] 22  SpO2:  [98 %-100 %] 100 %  BP: ()/(43-83) 107/54     Weight: 49 kg (108 lb)  Body mass index is 20.41 kg/m².    Intake/Output Summary (Last 24 hours) at 9/14/2024 1257  Last data filed at 9/14/2024 1200  Gross per 24 hour   Intake 852.59 ml   Output 850 ml   Net 2.59 ml         Physical Exam  Vitals and nursing note reviewed.   HENT:      Head: Normocephalic and atraumatic.      Mouth/Throat:      Mouth: Mucous membranes are moist.   Cardiovascular:      Comments: Pacing with TVP  Pulmonary:      Comments: Intubated on mechanical ventilation  Abdominal:      General: Bowel sounds are normal. There is no distension.      Palpations: Abdomen is soft.      Tenderness: There is no abdominal tenderness. There is no guarding or rebound.   Musculoskeletal:      Right lower leg: No edema.      Left lower leg: No edema.             Significant Labs: All pertinent labs within the past 24 hours have been reviewed.    Significant Imaging: I have reviewed all pertinent imaging results/findings within the past 24 hours.

## 2024-09-14 NOTE — PROGRESS NOTES
"Pulm/CC Fellow Progress Note    Attending Physician: No att. providers found    Date of Admit: 2024    Reason for Consult: Complete heart block/cardiogenic shock    History of Present Illness:  Pt intubated and sedated, unable to provide any HPI. Per chart review:   83F hx of HTN, HLD, Alzheimer's w/ lethargy per daughter, found to be unresponsive by pt's daughter. In complete heart block per EMS upon arrival. Hypotensive and bradycardic. Intubated in the ED and taken to the cath lab for emergent TVP.   Now admitted to the ICU s/p cutaneous pacing placement, intubated and sedated.     More hx per daughter: Patient was diagnosed with dementia/alzheimer a few months ago w/ main complaints being disorientation, memory loss, and abnormal behaviors. Daughter went to visit her on the day of presentation and noticed that the patient seemed "off" and wasn't performing her normal routine. She was having some diarrhea and endorsed feeling cold. The patient got into bed and and in an attempt to get out of bed and start walking the patient had profound weakness and started to fall with an increased feeling of being cold. EMS was then called.     Interval History:  No acute events overnight. Patient still on sedation this AM with plans to wean as tolerated. Planning for SBT/SAT today.     Objective:   Last 24 Hour Vital Signs:  BP  Min: 83/43  Max: 171/64  Temp  Av.5 °F (36.4 °C)  Min: 96.7 °F (35.9 °C)  Max: 97.9 °F (36.6 °C)  Pulse  Av.5  Min: 60  Max: 70  Resp  Av.3  Min: 0  Max: 34  SpO2  Av.9 %  Min: 98 %  Max: 100 %  Height  Av' 1" (154.9 cm)  Min: 5' 1" (154.9 cm)  Max: 5' 1" (154.9 cm)  Weight  Av.2 kg (117 lb 6.2 oz)  Min: 49 kg (108 lb)  Max: 65 kg (143 lb 4.8 oz)  Body mass index is 20.41 kg/m².  I/O last 3 completed shifts:  In: 425.1 [I.V.:425.1]  Out: 850 [Urine:850]    Physical Exam:  General: intubated and sedated  HEENT: AT/NC, pupillary light reflex intact, oral and nasal " mucosa moist.   Neck: Supple without JVD or palpable LAD.   Cardiac: normal rate, regular rhythm, symmetric pulses in distal extremities.  Respiratory/Chest: Mechanical breath sounds. Padding in place on R chest over TVP.  Abdomen: Soft, NT/ND. +BS.  Extremities: No edema.   Neuro: Intubated and sedated, grimacing to pain, not following commands    Imaging:   None      Assessment & Plan:   HTN, HLD, Alzheimer's w/ lethargy per daughter, found to be unresponsive. On arrival was in complete heartbnlock, Hypotensive and bradycardic concern for cardiogenic shock. Continuing to manage post extubation dn cardiology consulted for further evaluation and potential pacemaker placement.    CNS/Neuro:  - Intubated and sedated with propofol gtt and Fentanyl PRN for pain/agitation  - Daily SAT/SBT, may be appropriate for extubation tomorrow     Cardiovascular:  3rd Degree AV Block  S/p Transvenous Cardiac Pacing  Essential Hypertension  Hyperlipidemia  - Pt found with HR in 30s and SBP in 80s per EMS  - Pt taken emergently to cath lab on 9/13 for temporary transvenous pacemaker insertion   - Pt currently hemodynamically stable, resolved bradycardia and hypotension  - Holding home Amlodipine, Carvedilol, Irbesartan-HCTZ, and ASA  - , Troponin 0.070 -> 0.224, continue to trend  - TTE (9/13) significant for hyperdynamic systolic function (EF 77%), moderate LVOT obstruction, and moderate MVR  -await cards reccs and placement of PM     Respiratory:  Intubated and Mechanically Ventilated  - Pt intubated and sedated post-procedure  - Current vent settings:  Vent Mode: A/CMV-VC  Oxygen Concentration (%):  [] 100  Resp Rate Total:  [24 br/min-30 br/min] 24 br/min  Vt Set:  [380 mL] 380 mL  PEEP/CPAP:  [4.9 cmH20-5.3 cmH20] 5.1 cmH20  Mean Airway Pressure:  [9.2 nfV24-15.2 cmH20] 9.2 cmH20  - ABG post-intubation (9/13): pH 7.38, PCO2 31, PO2 444  - Daily SBT/SAT      GI/Metabolic:  GI Ppx: Anticipate extubation <72 hrs  NGT in  place  Diet: NPO   F: monitor I/Os   E: K>4, Mg>2  K s/p repletion     K 3.3 (L)    Mg    1.9   PHOS 4.0   N: NPO    Renal:  Acute Kidney Injury  ATN  - Cr of 2.3 upon admission, baseline 0.8  -Likely 2/2 to ATN  - Monitor with daily CMP, pt may benefit from IVF resuscitation  - Renally dose meds, avoid nephrotoxic agents    Intake/Output Summary (Last 24 hours) at 9/14/2024 0758  Last data filed at 9/14/2024 0648  Gross per 24 hour   Intake 425.1 ml   Output 850 ml   Net -424.9 ml     Urinary retention  -Patient retaining urine  -s/p bladder scan and 3 in and out cath in last in last 24 hours  -may benefit from placement of hartman     Heme:  DVT Ppx: Heparin TID  HH:11.4/32.2     Endo:   - A1c last measured (08/2024): 5.7%  - TSH wnl this admission     MSK:  Osteoporosis  - Previous DEXA scan showed T score of -2.7  - Holding home Denosumab and supplemental vitamin D      ID:  - No concerns for infection at this time, will monitor for post-operative fevers     Code Status: Full code     Disposition: Admitted to the ICU, intubated and sedated    ICU Checklist  Feeding: NPO  Analgesia/Sedation: Propofol   Sedation: Propofol   Thrombo PPX: Heparin  Head of Bed: >30 degrees  Ulcer (Stress) PPX: none, will add if not extubated <72hrs  Glucose: goal 140-180  SBT/SAT: N/A  Bowel Regimen: last BM yesterday, no regimen  Indwelling catheter/Lines/Invasive devices: RIJ CVC, PIVs   De-escalation ABX: None    CODE STATUS: Full  DISPO: extubation trial      Kerry Carcamo MD MD  [employer] [facility] PGY 1  09/14/2024 12:42 PM

## 2024-09-14 NOTE — ASSESSMENT & PLAN NOTE
S/p TVP  - hold BB for now, may need PPM but awaiting washout period  - monitor in ICU  - Cardiology following

## 2024-09-14 NOTE — H&P
[facility] Medicine  History & Physical    Patient Name: Trudy Horvath  MRN: [id number]  Patient Class: IP- Inpatient  Admission Date: 9/13/2024  Attending Physician: No att. providers found   Primary Care Provider: Maria C Guzman A.M., MD         Patient information was obtained from relative(s) and ER records.     Subjective:     Principal Problem:<principal problem not specified>    Chief Complaint:   Chief Complaint   Patient presents with    Heart block     Arrives via ems from home. Family reports V/D today with worsening lethargy. Accucheck-184 per ems. Pt. Noted to be in 3rd degree HB with rate-30's, sbp-80. Pacing initiated by ems.         HPI:   83 y.o. female with PMHx significant for Hyperlipidemia, Hypertension, dementia, CKD stage 3 was admitted for complete heart block s/p transvenous pacemaker, intubated on mechanical ventilation. Per daughter at bedside patient was in her usual state of health except for recently diagnosed with dementia. She went to eat with family at restaurant the day prior admission. She woke up with multiple episodes of vomiting diarrhea. Then suddenly she became very weak and was not able to walk. EMS was called, she was found to be in high degree heart block, transcutaneous pacemaker was placed and was transferred to the ER. In the ER she was found to be hypotensive with systolic in the 80s. She was brought to OR and have transvenous pacemaker placement, then transfer to ICU.    Past Medical History:   Diagnosis Date    Hyperlipidemia     Hypertension     Increased glucose level     risk of diabetes    Macular degeneration (senile) of retina, unspecified     Osteoporosis, post-menopausal        Past Surgical History:   Procedure Laterality Date    BREAST BIOPSY      benign       Review of patient's allergies indicates:  No Known Allergies    No current facility-administered medications on file prior to encounter.     Current Outpatient Medications on File  Prior to Encounter   Medication Sig    amLODIPine (NORVASC) 10 MG tablet Take 1 tablet (10 mg total) by mouth once daily.    aspirin 81 MG Chew Take 1 tablet (81 mg total) by mouth once daily.    cholecalciferol, vitamin D3, (VITAMIN D3) 50 mcg (2,000 unit) Cap Take 1 capsule by mouth once daily.    denosumab (PROLIA) 60 mg/mL Syrg Inject 1 mL (60 mg total) into the skin every 6 (six) months. (Patient not taking: Reported on 8/26/2024)    docosahexanoic acid/vit C/lut (EYE HEALTH ORAL) Take 1 tablet by mouth once daily.    irbesartan-hydrochlorothiazide (AVALIDE) 300-12.5 mg per tablet Take 1 tablet by mouth once daily.    potassium chloride SA (K-DUR,KLOR-CON) 20 MEQ tablet Take 1 tablet (20 mEq total) by mouth 2 (two) times daily. (Patient not taking: Reported on 8/26/2024)    rosuvastatin (CRESTOR) 20 MG tablet Take 1 tablet (20 mg total) by mouth once daily.    tetrahydrozoline HCl/peg (EYE DROPS OPHT) Apply 1 tablet to eye once daily. (Patient not taking: Reported on 8/26/2024)    [DISCONTINUED] carvediloL (COREG) 3.125 MG tablet Take 1 tablet (3.125 mg total) by mouth 2 (two) times daily.    [DISCONTINUED] ISTALOL 0.5 % DrpD  (Patient not taking: Reported on 8/26/2024)     Family History       Problem Relation (Age of Onset)    Heart disease Mother    Hypertension Father    No Known Problems Sister, Daughter    Stroke Father          Tobacco Use    Smoking status: Never     Passive exposure: Never    Smokeless tobacco: Never   Substance and Sexual Activity    Alcohol use: No    Drug use: Never    Sexual activity: Not Currently     Review of Systems   Unable to perform ROS: Acuity of condition     Objective:     Vital Signs (Most Recent):  Temp: 97 °F (36.1 °C) (09/13/24 1930)  Pulse: 60 (09/13/24 2138)  Resp: (!) 24 (09/13/24 2138)  BP: (!) 153/63 (09/13/24 2138)  SpO2: 100 % (09/13/24 2138) Vital Signs (24h Range):  Temp:  [97 °F (36.1 °C)-97.9 °F (36.6 °C)] 97 °F (36.1 °C)  Pulse:  [60-70] 60  Resp:  [0-31]  "24  SpO2:  [98 %-100 %] 100 %  BP: (104-168)/(52-69) 153/63     Weight: 49 kg (108 lb)  Body mass index is 20.41 kg/m².     Physical Exam  Vitals and nursing note reviewed.   HENT:      Head: Normocephalic and atraumatic.      Mouth/Throat:      Mouth: Mucous membranes are moist.   Cardiovascular:      Comments: Pacing with TVP  Pulmonary:      Comments: Intubated on mechanical ventilation  Abdominal:      General: Bowel sounds are normal. There is no distension.      Palpations: Abdomen is soft.      Tenderness: There is no abdominal tenderness. There is no guarding or rebound.   Musculoskeletal:      Right lower leg: No edema.      Left lower leg: No edema.                Significant Labs: All pertinent labs within the past 24 hours have been reviewed.  A1C:   Recent Labs   Lab 08/26/24  0844   HGBA1C 5.7*     BMP:   Recent Labs   Lab 09/13/24  1342   *      K 4.5   *   CO2 17*   BUN 41*   CREATININE 2.3*   CALCIUM 8.8   MG 1.9     CBC:   Recent Labs   Lab 09/13/24  1342   WBC 13.98*   HGB 10.1*   HCT 30.3*        CMP:   Recent Labs   Lab 09/13/24  1342      K 4.5   *   CO2 17*   *   BUN 41*   CREATININE 2.3*   CALCIUM 8.8   PROT 6.1   ALBUMIN 3.1*   BILITOT 0.5   ALKPHOS 55   AST 19   ALT 15   ANIONGAP 15     Cardiac Markers:   Recent Labs   Lab 09/13/24  1342   *     Coagulation: No results for input(s): "PT", "INR", "APTT" in the last 48 hours.  Lactic Acid: No results for input(s): "LACTATE" in the last 48 hours.  Lipid Panel: No results for input(s): "CHOL", "HDL", "LDLCALC", "TRIG", "CHOLHDL" in the last 48 hours.  Respiratory Culture: No results for input(s): "GSRESP", "RESPIRATORYC" in the last 48 hours.  Troponin:   Recent Labs   Lab 09/13/24  1342 09/13/24  1729   TROPONINI 0.070* 0.224*     TSH:   Recent Labs   Lab 09/13/24  1342   TSH 3.861     Urine Culture: No results for input(s): "LABURIN" in the last 48 hours.  Urine Studies: No results for " "input(s): "COLORU", "APPEARANCEUA", "PHUR", "SPECGRAV", "PROTEINUA", "GLUCUA", "KETONESU", "BILIRUBINUA", "OCCULTUA", "NITRITE", "UROBILINOGEN", "LEUKOCYTESUR", "RBCUA", "WBCUA", "BACTERIA", "SQUAMEPITHEL", "HYALINECASTS" in the last 48 hours.    Invalid input(s): "WRIGHTSUR"  Recent Lab Results  (Last 5 results in the past 24 hours)        09/13/24  1740   09/13/24  1731   09/13/24  1729   09/13/24  1726   09/13/24  1342        A2C EF       79         A4C EF       76         Albumin         3.1       ALP         55       ALT         15       Anion Gap         15       Ascending aorta       2.63         Ao peak fercho       2.23         Ao VTI       48.90         AST         19       AV valve area       5.83         WALLY by Velocity Ratio       6.04         AV mean gradient       11         AV index (prosthetic)       2.27         AV peak gradient       20         AV Velocity Ratio       2.35         Baso #         0.03       Basophil %         0.2       BILIRUBIN TOTAL         0.5  Comment: For infants and newborns, interpretation of results should be based  on gestational age, weight and in agreement with clinical  observations.    Premature Infant recommended reference ranges:  Up to 24 hours.............<8.0 mg/dL  Up to 48 hours............<12.0 mg/dL  3-5 days..................<15.0 mg/dL  6-29 days.................<15.0 mg/dL         BNP         603  Comment: Values of less than 100 pg/ml are consistent with non-CHF populations.       BSA       1.45         BUN         41       Calcium         8.8       Chloride         111       CO2         17       Creatinine         2.3       Left Ventricle Relative Wall Thickness       0.63         Differential Method         Automated       E/A ratio       0.86         E/E' ratio       41.67         eGFR         20       Eos #         0.0       Eos %         0.0       E wave deceleration time       332.08         FS       37         Glucose         172       Gran # (ANC)     "     11.9       Gran %         84.7       Hematocrit         30.3       Hemoglobin         10.1       Mitral Valve Heart Rate       60         Immature Grans (Abs)         0.12  Comment: Mild elevation in immature granulocytes is non specific and   can be seen in a variety of conditions including stress response,   acute inflammation, trauma and pregnancy. Correlation with other   laboratory and clinical findings is essential.         Immature Granulocytes         0.9       IVC diameter       2.23         IVSd       1.16         LA area A2C       15.48         LA area A4C       17.96         LA size       3.10         LA volume       44.99         LA Volume Index (Mod)       31.0         LVOT area       2.6         LV LATERAL E/E' RATIO       41.67         LV SEPTAL E/E' RATIO       41.67         LV EDV BP       39.57         LV Diastolic Volume Index       27.29         Left Ventricular End Diastolic Volume by Teichholz Method       39.57         LV EDV A2C       [id number]         LV EDV A4C       44.65         Left Ventricular End Systolic Volume by Teichholz Method       12.26         LV ESV A2C       37.28         LV ESV A4C       51.10         LVIDd       3.15         LVIDs       1.97         LV mass       98.46         LV Mass Index       68         Left Ventricular Outflow Tract Mean Gradient       63.13         Left Ventricular Outflow Tract Mean Velocity       3.73         LVOT diameter       1.81         LVOT peak fercho       5.24         LVOT stroke volume       284.95         LVOT peak VTI       110.80         LV ESV BP       12.26         LV Systolic Volume Index       8.5         Lymph #         1.4       Lymph %         9.9       Magnesium          1.9       MCH         29.8       MCHC         33.3       MCV         89       Mean e'       0.03         Mono #         0.6       Mono %         4.3       MPV         10.1       Mr max fercho       5.68         MR PISA EROA       0.28         MV valve  "area p 1/2 method       2.28         MV "A" wave duration       [id number]         MV valve area by continuity eq       6.26         MV mean gradient       5         MV peak gradient       8         MV Peak A Gerardo       1.45         MV Peak E Gerardo       1.25         MV stenosis pressure 1/2 time       96.30         MV VTI       45.5         nRBC         0       Taylor's Biplane MOD Ejection Fraction       77         Radius       0.64         Platelet Count         278       POC Glucose 142               POCT Glucose   142             Potassium         4.5       PROTEIN TOTAL         6.1       Pulmonary Valve Mean Velocity       0.83         PV peak gradient       7         PV PEAK VELOCITY       1.33         Posterior Wall       0.99         Right Atrium Volume Systolic       22.53         RA area       10.3         RA area length vol       20.93         RBC         3.39       RDW         13.6       RV S'       13.38         Sinus       2.77         Sodium         143       STJ       2.53         TAPSE       2.17         TDI SEPTAL       0.03         TDI LATERAL       0.03         Triscuspid Valve Regurgitation Peak Gradient       41         TR Max Gerardo       3.19         Troponin I     0.224  Comment: The reference interval for Troponin I represents the 99th percentile   cutoff   for our facility and is consistent with 3rd generation assay   performance.       0.070  Comment: The reference interval for Troponin I represents the 99th percentile   cutoff   for our facility and is consistent with 3rd generation assay   performance.         TSH         3.861       Vn Nyquist       0.61         Vn Nyquist MS       0.61         WBC         13.98       ZLVIDD       -3.31         ZLVIDS       -2.56                                Significant Imaging: I have reviewed all pertinent imaging results/findings within the past 24 hours.  Assessment/Plan:     Acute respiratory failure with hypoxia and hypercarbia  Patient " with Hypoxic Respiratory failure which is Acute.  she is not on home oxygen. Supplemental oxygen was provided and noted- Vent Mode: A/CMV-VC  Oxygen Concentration (%):  [] 40  Resp Rate Total:  [24 br/min-27 br/min] 24 br/min  Vt Set:  [380 mL] 380 mL  PEEP/CPAP:  [4.9 cmH20-5.2 cmH20] 5 cmH20  Mean Airway Pressure:  [9.5 tjC75-04.2 cmH20] 9.5 cmH20    .   Signs/symptoms of respiratory failure include- respiratory distress. Contributing diagnoses includes -  complete heart block with hypotension  Labs and images were reviewed. Patient Has not had a recent ABG. Will treat underlying causes and adjust management of respiratory failure a    CHB (complete heart block)  S/p TVP  Defer to cardiology for further management      Hypertensive kidney disease with stage 3b chronic kidney disease  Creatine stable for now. BMP reviewed- noted Estimated Creatinine Clearance: 13.5 mL/min (A) (based on SCr of 2.3 mg/dL (H)). according to latest data. Based on current GFR, CKD stage is stage 4 - GFR 15-29.  Monitor UOP and serial BMP and adjust therapy as needed. Renally dose meds. Avoid nephrotoxic medications and procedures.      VTE Risk Mitigation (From admission, onward)           Ordered     heparin (porcine) injection 5,000 Units  Every 8 hours         09/13/24 1982                  Critical care time spent on the evaluation and treatment of severe organ dysfunction, review of pertinent labs and imaging studies, discussions with consulting providers and discussions with patient/family: >45 minutes.                  Coy Ziegler MD  Department of Hospital Medicine  [place] - Intensive Care

## 2024-09-14 NOTE — PLAN OF CARE
Problem: Adult Inpatient Plan of Care  Goal: Plan of Care Review  Outcome: Progressing  Goal: Patient-Specific Goal (Individualized)  Outcome: Progressing  Goal: Absence of Hospital-Acquired Illness or Injury  Outcome: Progressing  Goal: Optimal Comfort and Wellbeing  Outcome: Progressing  Goal: Readiness for Transition of Care  Outcome: Progressing     Problem: Wound  Goal: Optimal Coping  Outcome: Progressing  Goal: Optimal Functional Ability  Outcome: Progressing  Goal: Absence of Infection Signs and Symptoms  Outcome: Progressing  Goal: Improved Oral Intake  Outcome: Progressing  Goal: Optimal Pain Control and Function  Outcome: Progressing  Goal: Skin Health and Integrity  Outcome: Progressing  Goal: Optimal Wound Healing  Outcome: Progressing     Problem: Fall Injury Risk  Goal: Absence of Fall and Fall-Related Injury  Outcome: Progressing     Problem: Restraint, Nonviolent  Goal: Absence of Harm or Injury  Outcome: Progressing     Problem: Skin Injury Risk Increased  Goal: Skin Health and Integrity  Outcome: Progressing     Problem: Mechanical Ventilation Invasive  Goal: Effective Communication  Outcome: Progressing  Goal: Optimal Device Function  Outcome: Progressing  Goal: Mechanical Ventilation Liberation  Outcome: Progressing  Goal: Optimal Nutrition Delivery  Outcome: Progressing  Goal: Absence of Device-Related Skin and Tissue Injury  Outcome: Progressing  Goal: Absence of Ventilator-Induced Lung Injury  Outcome: Progressing     Problem: Infection  Goal: Absence of Infection Signs and Symptoms  Outcome: Progressing     Problem: Acute Kidney Injury/Impairment  Goal: Fluid and Electrolyte Balance  Outcome: Progressing  Goal: Improved Oral Intake  Outcome: Progressing  Goal: Effective Renal Function  Outcome: Progressing

## 2024-09-14 NOTE — ASSESSMENT & PLAN NOTE
Creatine stable for now. BMP reviewed- noted Estimated Creatinine Clearance: 13.5 mL/min (A) (based on SCr of 2.3 mg/dL (H)). according to latest data. Based on current GFR, CKD stage is stage 4 - GFR 15-29.  Monitor UOP and serial BMP and adjust therapy as needed. Renally dose meds. Avoid nephrotoxic medications and procedures.

## 2024-09-15 PROBLEM — Z97.8 ENDOTRACHEALLY INTUBATED: Status: RESOLVED | Noted: 2024-09-13 | Resolved: 2024-09-15

## 2024-09-15 PROBLEM — R07.9 CHEST PAIN: Status: RESOLVED | Noted: 2024-09-13 | Resolved: 2024-09-15

## 2024-09-15 PROBLEM — N17.9 AKI (ACUTE KIDNEY INJURY): Status: RESOLVED | Noted: 2024-09-14 | Resolved: 2024-09-15

## 2024-09-15 LAB
ALBUMIN SERPL BCP-MCNC: 3 G/DL (ref 3.5–5.2)
ALP SERPL-CCNC: 66 U/L (ref 55–135)
ALT SERPL W/O P-5'-P-CCNC: 19 U/L (ref 10–44)
ANION GAP SERPL CALC-SCNC: 12 MMOL/L (ref 8–16)
AST SERPL-CCNC: 23 U/L (ref 10–40)
BASOPHILS # BLD AUTO: 0.04 K/UL (ref 0–0.2)
BASOPHILS NFR BLD: 0.3 % (ref 0–1.9)
BILIRUB SERPL-MCNC: 0.4 MG/DL (ref 0.1–1)
BUN SERPL-MCNC: 30 MG/DL (ref 8–23)
CALCIUM SERPL-MCNC: 9.1 MG/DL (ref 8.7–10.5)
CHLORIDE SERPL-SCNC: 109 MMOL/L (ref 95–110)
CO2 SERPL-SCNC: 19 MMOL/L (ref 23–29)
CREAT SERPL-MCNC: 1.3 MG/DL (ref 0.5–1.4)
DIFFERENTIAL METHOD BLD: ABNORMAL
EOSINOPHIL # BLD AUTO: 0.1 K/UL (ref 0–0.5)
EOSINOPHIL NFR BLD: 0.6 % (ref 0–8)
ERYTHROCYTE [DISTWIDTH] IN BLOOD BY AUTOMATED COUNT: 13.7 % (ref 11.5–14.5)
EST. GFR  (NO RACE VARIABLE): 40 ML/MIN/1.73 M^2
GLUCOSE SERPL-MCNC: 97 MG/DL (ref 70–110)
HCT VFR BLD AUTO: 32 % (ref 37–48.5)
HGB BLD-MCNC: 10.8 G/DL (ref 12–16)
IMM GRANULOCYTES # BLD AUTO: 0.05 K/UL (ref 0–0.04)
IMM GRANULOCYTES NFR BLD AUTO: 0.4 % (ref 0–0.5)
LYMPHOCYTES # BLD AUTO: 2.6 K/UL (ref 1–4.8)
LYMPHOCYTES NFR BLD: 20.6 % (ref 18–48)
MCH RBC QN AUTO: 29.7 PG (ref 27–31)
MCHC RBC AUTO-ENTMCNC: 33.8 G/DL (ref 32–36)
MCV RBC AUTO: 88 FL (ref 82–98)
MONOCYTES # BLD AUTO: 1.1 K/UL (ref 0.3–1)
MONOCYTES NFR BLD: 8.8 % (ref 4–15)
NEUTROPHILS # BLD AUTO: 8.7 K/UL (ref 1.8–7.7)
NEUTROPHILS NFR BLD: 69.3 % (ref 38–73)
NRBC BLD-RTO: 0 /100 WBC
PLATELET # BLD AUTO: 240 K/UL (ref 150–450)
PMV BLD AUTO: 10.1 FL (ref 9.2–12.9)
POCT GLUCOSE: 107 MG/DL (ref 70–110)
POCT GLUCOSE: 141 MG/DL (ref 70–110)
POCT GLUCOSE: 153 MG/DL (ref 70–110)
POCT GLUCOSE: 170 MG/DL (ref 70–110)
POTASSIUM SERPL-SCNC: 4 MMOL/L (ref 3.5–5.1)
PROT SERPL-MCNC: 6.5 G/DL (ref 6–8.4)
RBC # BLD AUTO: 3.64 M/UL (ref 4–5.4)
SODIUM SERPL-SCNC: 140 MMOL/L (ref 136–145)
WBC # BLD AUTO: 12.53 K/UL (ref 3.9–12.7)

## 2024-09-15 PROCEDURE — 99233 SBSQ HOSP IP/OBS HIGH 50: CPT | Mod: ,,, | Performed by: STUDENT IN AN ORGANIZED HEALTH CARE EDUCATION/TRAINING PROGRAM

## 2024-09-15 PROCEDURE — 25000003 PHARM REV CODE 250: Performed by: HOSPITALIST

## 2024-09-15 PROCEDURE — 97110 THERAPEUTIC EXERCISES: CPT | Performed by: PHYSICAL THERAPIST

## 2024-09-15 PROCEDURE — 85025 COMPLETE CBC W/AUTO DIFF WBC: CPT

## 2024-09-15 PROCEDURE — 27000221 HC OXYGEN, UP TO 24 HOURS

## 2024-09-15 PROCEDURE — 63600175 PHARM REV CODE 636 W HCPCS

## 2024-09-15 PROCEDURE — 27200667 HC PACEMAKER, TEMPORARY MONITORING, PER SHIFT

## 2024-09-15 PROCEDURE — 94761 N-INVAS EAR/PLS OXIMETRY MLT: CPT

## 2024-09-15 PROCEDURE — 97162 PT EVAL MOD COMPLEX 30 MIN: CPT | Performed by: PHYSICAL THERAPIST

## 2024-09-15 PROCEDURE — 25000003 PHARM REV CODE 250: Performed by: FAMILY MEDICINE

## 2024-09-15 PROCEDURE — 80053 COMPREHEN METABOLIC PANEL: CPT

## 2024-09-15 PROCEDURE — 51701 INSERT BLADDER CATHETER: CPT

## 2024-09-15 PROCEDURE — 25000003 PHARM REV CODE 250: Performed by: INTERNAL MEDICINE

## 2024-09-15 PROCEDURE — 20000000 HC ICU ROOM

## 2024-09-15 PROCEDURE — 51798 US URINE CAPACITY MEASURE: CPT

## 2024-09-15 PROCEDURE — 99900035 HC TECH TIME PER 15 MIN (STAT)

## 2024-09-15 RX ORDER — INSULIN ASPART 100 [IU]/ML
0-5 INJECTION, SOLUTION INTRAVENOUS; SUBCUTANEOUS EVERY 6 HOURS PRN
Status: DISCONTINUED | OUTPATIENT
Start: 2024-09-15 | End: 2024-09-18

## 2024-09-15 RX ORDER — ACETAMINOPHEN 325 MG/1
650 TABLET ORAL EVERY 6 HOURS PRN
Status: DISCONTINUED | OUTPATIENT
Start: 2024-09-15 | End: 2024-09-17

## 2024-09-15 RX ORDER — HEPARIN SODIUM 1000 [USP'U]/ML
1000 INJECTION, SOLUTION INTRAVENOUS; SUBCUTANEOUS
Status: DISCONTINUED | OUTPATIENT
Start: 2024-09-15 | End: 2024-09-15

## 2024-09-15 RX ORDER — CHOLECALCIFEROL (VITAMIN D3) 10 MCG
2000 TABLET ORAL DAILY
Status: DISCONTINUED | OUTPATIENT
Start: 2024-09-15 | End: 2024-09-19 | Stop reason: HOSPADM

## 2024-09-15 RX ORDER — GLUCAGON 1 MG
1 KIT INJECTION
Status: DISCONTINUED | OUTPATIENT
Start: 2024-09-15 | End: 2024-09-18

## 2024-09-15 RX ORDER — ATORVASTATIN CALCIUM 40 MG/1
80 TABLET, FILM COATED ORAL DAILY
Status: DISCONTINUED | OUTPATIENT
Start: 2024-09-15 | End: 2024-09-19 | Stop reason: HOSPADM

## 2024-09-15 RX ADMIN — ATORVASTATIN CALCIUM 80 MG: 40 TABLET, FILM COATED ORAL at 12:09

## 2024-09-15 RX ADMIN — ONDANSETRON 8 MG: 8 TABLET, ORALLY DISINTEGRATING ORAL at 08:09

## 2024-09-15 RX ADMIN — ACETAMINOPHEN 650 MG: 325 TABLET ORAL at 08:09

## 2024-09-15 RX ADMIN — HEPARIN SODIUM 5000 UNITS: 5000 INJECTION INTRAVENOUS; SUBCUTANEOUS at 05:09

## 2024-09-15 RX ADMIN — CHOLECALCIFEROL (VITAMIN D3) 10 MCG (400 UNIT) TABLET 2000 UNITS: at 12:09

## 2024-09-15 RX ADMIN — HEPARIN SODIUM 5000 UNITS: 5000 INJECTION INTRAVENOUS; SUBCUTANEOUS at 03:09

## 2024-09-15 RX ADMIN — HEPARIN SODIUM 5000 UNITS: 5000 INJECTION INTRAVENOUS; SUBCUTANEOUS at 10:09

## 2024-09-15 RX ADMIN — MELATONIN TAB 3 MG 6 MG: 3 TAB at 08:09

## 2024-09-15 NOTE — SUBJECTIVE & OBJECTIVE
Interval History:  Extubated yesterday and doing well.  Transvenous pacer in place and patient is still requiring pacing.  Will need ppm placed.  Discussed with Cardiology service.    Review of Systems  Objective:     Vital Signs (Most Recent):  Temp: 98.1 °F (36.7 °C) (09/15/24 0745)  Pulse: 60 (09/15/24 0800)  Resp: (!) 22 (09/15/24 0800)  BP: (!) 110/56 (09/15/24 0800)  SpO2: 95 % (09/15/24 0800) Vital Signs (24h Range):  Temp:  [97.9 °F (36.6 °C)-100 °F (37.8 °C)] 98.1 °F (36.7 °C)  Pulse:  [60-61] 60  Resp:  [16-31] 22  SpO2:  [92 %-100 %] 95 %  BP: (104-145)/(51-64) 110/56     Weight: 49 kg (108 lb)  Body mass index is 20.41 kg/m².    Intake/Output Summary (Last 24 hours) at 9/15/2024 0950  Last data filed at 9/15/2024 0600  Gross per 24 hour   Intake 590.33 ml   Output 700 ml   Net -109.67 ml         Physical Exam  Vitals and nursing note reviewed.   HENT:      Head: Normocephalic and atraumatic.      Mouth/Throat:      Mouth: Mucous membranes are moist.   Cardiovascular:      Comments: Pacing with TVP  Abdominal:      General: Bowel sounds are normal. There is no distension.      Palpations: Abdomen is soft.      Tenderness: There is no abdominal tenderness. There is no guarding or rebound.   Musculoskeletal:      Right lower leg: No edema.      Left lower leg: No edema.   Neurological:      Mental Status: She is alert and oriented to person, place, and time.             Significant Labs: All pertinent labs within the past 24 hours have been reviewed.    Significant Imaging: I have reviewed all pertinent imaging results/findings within the past 24 hours.

## 2024-09-15 NOTE — PT/OT/SLP EVAL
Physical Therapy Evaluation    Patient Name:  Trudy Horvath   MRN:  [id number]    Recommendations:     Discharge Recommendations:  (TBD post mobility assessment)   Discharge Equipment Recommendations: none   Barriers to discharge: Decreased caregiver support    Assessment:     Trudy Horvath is a 83 y.o. female admitted with a medical diagnosis of CHB (complete heart block).  She presents with the following impairments/functional limitations: weakness, impaired balance, impaired endurance, impaired cognition, impaired cardiopulmonary response to activity, impaired self care skills, impaired functional mobility, gait instability, decreased lower extremity function.    Rehab Prognosis: Good; patient would benefit from acute skilled PT services to address these deficits and reach maximum level of function.    Recent Surgery: Procedure(s) (LRB):  Insertion, Pacemaker, Temporary Transvenous (N/A) 2 Days Post-Op    Plan:     During this hospitalization, patient to be seen 5 x/week to address the identified rehab impairments via therapeutic activities, therapeutic exercises, neuromuscular re-education and progress toward the following goals:    Plan of Care Expires:       Subjective     Chief Complaint: none  Patient/Family Comments/goals: I can do that  Pain/Comfort:  Pain Rating 1: 0/10    Patients cultural, spiritual, Shinto conflicts given the current situation:      Living Environment:   Pt lives alone in a 2SH with THE, reports daughter comes over frequently.  Pt does not drive.  Prior to admission, patients level of function was Ind community ambulation without AD per patient.  Equipment used at home: none.  DME owned (not currently used): none.  Upon discharge, patient will have assistance from her daughter.    Objective:     Communicated with nurse prior to session.  Patient found HOB elevated with bed alarm, telemetry, SCD, peripheral IV (TVP, no mobility out of bed per nursing)  upon PT entry to room.    General  "Precautions: Standard, fall  Orthopedic Precautions:    Braces:    Respiratory Status: Room air    Exams:  Pt was not oriented to place or time.  Pt has BLE AROM WFL and 4 to 5/5 BLE strength.     Functional Mobility:  Restricted 2/2 TVP - nurse requesting patient stay in bed. Pt rolls bilaterally Ind. Pt scooted to HOB with supervision.        AM-PAC 6 CLICK MOBILITY  Total Score:        Treatment & Education:  Pt performed BLE therapeutic exercises 10 x 2:  SLR, hip abd/add supine, SAQ, hip/knee flex/ext, AP and bridging 8 x 2.  Rx of achilles stretching 45" x 3 BLE's.     Patient left HOB elevated with all lines intact, call button in reach, bed alarm on, and nurse notified.    GOALS:   Multidisciplinary Problems       Physical Therapy Goals          Problem: Physical Therapy    Goal Priority Disciplines Outcome Goal Variances Interventions   Physical Therapy Goal     PT, PT/OT Progressing     Description: Goals to be met by: 10/3/24     Patient will increase functional independence with mobility by performin.  Ind HEP  2.  Ind bed mobility  3.  Ind bed to chair transfer  4.  Ambulate 150' with Mod Ind with or without AD  5.  Up in chair 2 hours                         History:     Past Medical History:   Diagnosis Date    Hyperlipidemia     Hypertension     Increased glucose level     risk of diabetes    Macular degeneration (senile) of retina, unspecified     Osteoporosis, post-menopausal        Past Surgical History:   Procedure Laterality Date    BREAST BIOPSY      benign       Time Tracking:     PT Received On: 09/15/24  PT Start Time: 1038     PT Stop Time: 1103  PT Total Time (min): 25 min     Billable Minutes: Evaluation 10 and Therapeutic Exercise 15      09/15/2024  "

## 2024-09-15 NOTE — PROGRESS NOTES
[facility] Medicine  Progress Note    Patient Name: Trudy Horvath  MRN: [id number]  Patient Class: IP- Inpatient   Admission Date: 9/13/2024  Length of Stay: 2 days  Attending Physician: Soren Narayan,*  Primary Care Provider: Maria C Guzman A.M., MD        Subjective:     Principal Problem:CHB (complete heart block)        HPI:  83 y.o. female with PMHx significant for Hyperlipidemia, Hypertension, dementia, CKD stage 3 was admitted for complete heart block s/p transvenous pacemaker, intubated on mechanical ventilation. Per daughter at bedside patient was in her usual state of health except for recently diagnosed with dementia. She went to eat with family at restaurant the day prior admission. She woke up with multiple episodes of vomiting diarrhea. Then suddenly she became very weak and was not able to walk. EMS was called, she was found to be in high degree heart block, transcutaneous pacemaker was placed and was transferred to the ER. In the ER she was found to be hypotensive with systolic in the 80s. She was brought to OR and have transvenous pacemaker placement, then transfer to ICU.    Overview/Hospital Course:  No notes on file    Interval History:  Extubated yesterday and doing well.  Transvenous pacer in place and patient is still requiring pacing.  Will need ppm placed.  Discussed with Cardiology service.    Review of Systems  Objective:     Vital Signs (Most Recent):  Temp: 98.1 °F (36.7 °C) (09/15/24 0745)  Pulse: 60 (09/15/24 0800)  Resp: (!) 22 (09/15/24 0800)  BP: (!) 110/56 (09/15/24 0800)  SpO2: 95 % (09/15/24 0800) Vital Signs (24h Range):  Temp:  [97.9 °F (36.6 °C)-100 °F (37.8 °C)] 98.1 °F (36.7 °C)  Pulse:  [60-61] 60  Resp:  [16-31] 22  SpO2:  [92 %-100 %] 95 %  BP: (104-145)/(51-64) 110/56     Weight: 49 kg (108 lb)  Body mass index is 20.41 kg/m².    Intake/Output Summary (Last 24 hours) at 9/15/2024 0950  Last data filed at 9/15/2024 0600  Gross per 24 hour    Intake 590.33 ml   Output 700 ml   Net -109.67 ml         Physical Exam  Vitals and nursing note reviewed.   HENT:      Head: Normocephalic and atraumatic.      Mouth/Throat:      Mouth: Mucous membranes are moist.   Cardiovascular:      Comments: Pacing with TVP  Abdominal:      General: Bowel sounds are normal. There is no distension.      Palpations: Abdomen is soft.      Tenderness: There is no abdominal tenderness. There is no guarding or rebound.   Musculoskeletal:      Right lower leg: No edema.      Left lower leg: No edema.   Neurological:      Mental Status: She is alert and oriented to person, place, and time.             Significant Labs: All pertinent labs within the past 24 hours have been reviewed.    Significant Imaging: I have reviewed all pertinent imaging results/findings within the past 24 hours.    Assessment/Plan:      * CHB (complete heart block)  S/p TVP  - hold BB for now, anticipate need for PPM but awaiting washout period  - monitor in ICU  - NPO at midnight for possible ppm  - Cardiology following      Acute respiratory failure with hypoxia and hypercarbia  Patient with Hypoxic Respiratory failure which is Acute.  she is not on home oxygen. Supplemental oxygen was provided and noted- nasal cannula oxygen    .   Signs/symptoms of respiratory failure include- respiratory distress. Contributing diagnoses includes -  complete heart block with hypotension  Labs and images were reviewed. Patient Has not had a recent ABG. Will treat underlying causes and adjust management of respiratory failure   -wean supplemental oxygen    Temporary transvenous cardiac pacemaker present  - noted      Diastolic dysfunction  -in setting of complete heart block      Hypertensive kidney disease with stage 3b chronic kidney disease  - see ELKE  BMP reviewed- noted Estimated Creatinine Clearance: 23.9 mL/min (based on SCr of 1.3 mg/dL). according to latest data. Based on current GFR, CKD stage is stage 4 - GFR  15-29.  Monitor UOP and serial BMP and adjust therapy as needed. Renally dose meds. Avoid nephrotoxic medications and procedures.      VTE Risk Mitigation (From admission, onward)           Ordered     heparin (porcine) injection 5,000 Units  Every 8 hours         09/13/24 8699                    Discharge Planning   LARISSA:      Code Status: Not on file   Is the patient medically ready for discharge?:     Reason for patient still in hospital (select all that apply): Patient trending condition, Treatment, and Consult recommendations               Critical care time spent on the evaluation and treatment of severe organ dysfunction, review of pertinent labs and imaging studies, discussions with consulting providers and discussions with patient/family: 35 minutes.      Soren Narayan MD  Department of Hospital Medicine   [place] - Intensive Care

## 2024-09-15 NOTE — ASSESSMENT & PLAN NOTE
- see ELKE  BMP reviewed- noted Estimated Creatinine Clearance: 23.9 mL/min (based on SCr of 1.3 mg/dL). according to latest data. Based on current GFR, CKD stage is stage 4 - GFR 15-29.  Monitor UOP and serial BMP and adjust therapy as needed. Renally dose meds. Avoid nephrotoxic medications and procedures.

## 2024-09-15 NOTE — ASSESSMENT & PLAN NOTE
S/p TVP  - hold BB for now, anticipate need for PPM but awaiting washout period  - monitor in ICU  - NPO at midnight for possible ppm  - Cardiology following     ACP/Resident

## 2024-09-15 NOTE — PROGRESS NOTES
"Pulm/CC Fellow Progress Note    Attending Physician: Soren Narayan,*    Date of Admit: 2024    Reason for Consult: Complete heart block/cardiogenic shock    History of Present Illness:  Pt intubated and sedated, unable to provide any HPI. Per chart review:   83F hx of HTN, HLD, Alzheimer's w/ lethargy per daughter, found to be unresponsive by pt's daughter. In complete heart block per EMS upon arrival. Hypotensive and bradycardic. Intubated in the ED and taken to the cath lab for emergent TVP.   Now admitted to the ICU s/p cutaneous pacing placement, intubated and sedated.     More hx per daughter: Patient was diagnosed with dementia/alzheimer a few months ago w/ main complaints being disorientation, memory loss, and abnormal behaviors. Daughter went to visit her on the day of presentation and noticed that the patient seemed "off" and wasn't performing her normal routine. She was having some diarrhea and endorsed feeling cold. The patient got into bed and and in an attempt to get out of bed and start walking the patient had profound weakness and started to fall with an increased feeling of being cold. EMS was then called.     Interval History:  No acute events overnight. Pt extubated yesterday. Resting comfortably in bed this AM. Pt endorsing difficulty urinating, otherwise feels well. Plan for permanent pacemaker placement this week.      Objective:   Last 24 Hour Vital Signs:  BP  Min: 104/51  Max: 145/62  Temp  Av.8 °F (37.1 °C)  Min: 97.9 °F (36.6 °C)  Max: 100 °F (37.8 °C)  Pulse  Av  Min: 60  Max: 61  Resp  Av.3  Min: 16  Max: 31  SpO2  Av.5 %  Min: 92 %  Max: 100 %  Body mass index is 20.41 kg/m².  I/O last 3 completed shifts:  In: 968.1 [I.V.:668.1; IV Piggyback:300]  Out: 1550 [Urine:1550]    Physical Exam:  General: NAD, resting comfortably in bed  HEENT: AT/NC, pupillary light reflex intact, oral and nasal mucosa moist.   Neck: Supple without JVD or palpable LAD. "   Cardiac: normal rate, regular rhythm, symmetric pulses in distal extremities.  Respiratory/Chest: Mechanical breath sounds. Padding in place on R chest over TVP.  Abdomen: Soft, NT/ND. +BS.  Extremities: No edema.   Neuro: Aox3, no focal deficits on limited neuro exam    Assessment & Plan:    HTN, HLD, Alzheimer's w/ lethargy per daughter, found to be unresponsive. On arrival was in complete heart block, Hypotensive and bradycardic concern for cardiogenic shock. Temporary transvenous pacemaker placed on 9/13/24. Admitted to the ICU for close monitoring. Plan for permanent pacemaker placement.     CNS/Neuro:  - Previously intubated and sedated, extubated on 9/14  - Delirium precautions in place, family at bedside, reported hx of Alzheimer's dementia     Cardiovascular:  3rd Degree AV Block  S/p Transvenous Cardiac Pacing  Essential Hypertension  Hyperlipidemia  - Pt found with HR in 30s and SBP in 80s per EMS  - Pt taken emergently to cath lab on 9/13 for temporary transvenous pacemaker insertion   - Pt currently hemodynamically stable, resolved bradycardia and hypotension, HR set at 60  - Holding home Amlodipine, Carvedilol, Irbesartan-HCTZ, and ASA  - , Troponin peak of 0.336  - TTE (9/13) significant for hyperdynamic systolic function (EF 77%), moderate LVOT obstruction, and moderate MVR  -await cards reccs and placement of PM  - Plan for permanent pacemaker placement this week per Cardiology     Respiratory:  - Pt previously intubated 9/13 for procedure, extubated 9/14  - Currently on 2 L NC, wean as tolerated      GI/Metabolic:  GI Ppx: N/A  Diet: cardiac diet, NPO at midnight   F: monitor I/Os   E: K>4, Mg>2, replete as needed    Renal:  Acute Kidney Injury, resolved  - Cr of 2.3 upon admission, baseline 0.8, 1.3 this AM  - ELKE likely d/t hypoperfusion in the setting of cardiac event  - Monitor with daily CMP  - Renally dose meds, avoid nephrotoxic agents    Intake/Output Summary (Last 24 hours) at  9/15/2024 1015  Last data filed at 9/15/2024 0600  Gross per 24 hour   Intake 468.01 ml   Output 700 ml   Net -231.99 ml     Urinary retention  -Patient retaining urine  -Q6h bladder scans, pt has required intermittent in&out catheterization  -may benefit from hartman catheter placement if retention persists     Heme:  DVT Ppx: Heparin TID    - H/H stable, monitor with daily CBC     Endo:   - A1c last measured (08/2024): 5.7%  - TSH wnl this admission     MSK:  Osteoporosis  - Previous DEXA scan showed T score of -2.7  - Holding home Denosumab, restarted home supplemental vitamin D      ID:  - No concerns for infection at this time, will monitor for post-operative fevers     Code Status: Full code     Disposition: Admitted to the ICU, plan to permanent pacemaker placement    ICU Checklist  Feeding: cardiac diet  Analgesia/Sedation: PRN Tylenol   Sedation: N/A   Thrombo PPX: Heparin  Head of Bed: >30 degrees  Ulcer (Stress) PPX: N/A  Glucose: goal 140-180  SBT/SAT: N/A  Bowel Regimen: last BM 9/13, no regimen required at this time  Indwelling catheter/Lines/Invasive devices: PIVs   De-escalation ABX: N/A    CODE STATUS: Full  DISPO: Monitoring in ICU with temporary transvenous pacemaker    Luke Campo DO  [facility] PGY 2  09/15/2024 12:42 PM

## 2024-09-15 NOTE — PROGRESS NOTES
[facility]        Subjective:     No acute events overnight. Currently paced. Extubated yesterday and doing well. Agreeable for PPM tomorrow.   Objective:     Last 24 Hour Vital Signs:  BP  Min: 100/49  Max: 145/62  Temp  Av.1 °F (37.3 °C)  Min: 98.1 °F (36.7 °C)  Max: 100 °F (37.8 °C)  Pulse  Av  Min: 60  Max: 61  Resp  Av.1  Min: 16  Max: 35  SpO2  Av.3 %  Min: 92 %  Max: 100 %  I/O last 3 completed shifts:  In: 968.1 [I.V.:668.1; IV Piggyback:300]  Out: 1550 [Urine:1550]    Physical Examination:  GEN: Comfortable, No distress  HEENT: No JVD. No carotid bruit  Pulm : Intubated on mechanical vent  CVS: TVP in place  ABD: Soft, NT. BS+  Ext: Moves freely.  Neuro: no focal deficits  Psych: Appropriate.    Laboratory:  Laboratory Data Reviewed: yes  Pertinent Findings:      Other Results:  EKG   ECHO: reviewed         Current Medications:     Infusions:   0.9% NaCl   Intravenous Continuous 10 mL/hr at 09/15/24 0600 Rate Verify at 09/15/24 0600    DOPamine  5 mcg/kg/min Intravenous Continuous   Stopped at 24 1322    propofoL  0-50 mcg/kg/min Intravenous Continuous   Stopped at 24 1343        Scheduled:   atorvastatin  80 mg Oral Daily    cholecalciferol (vitamin D3)  2,000 Units Oral Daily    heparin (porcine)  5,000 Units Subcutaneous Q8H        PRN:    Current Facility-Administered Medications:     acetaminophen, 650 mg, Oral, Q4H PRN    melatonin, 6 mg, Oral, Nightly PRN    ondansetron, 8 mg, Oral, Q8H PRN      Assessment:     Trudy Horvath is a 83 y.o.female with  Patient Active Problem List    Diagnosis Date Noted    CHB (complete heart block) 2024    Temporary transvenous cardiac pacemaker present 2024    Acute respiratory failure with hypoxia and hypercarbia 2024    Abnormal fasting glucose 2024    B12 deficiency 2024    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 Holding AVNB. Will plan for PPM for tomorrow. NPO at MN. Rest of care per primary team. Patient agreeable. Discussed with primary team     Allan Carter MD  [facility] Card[facility]

## 2024-09-15 NOTE — ASSESSMENT & PLAN NOTE
Patient with Hypoxic Respiratory failure which is Acute.  she is not on home oxygen. Supplemental oxygen was provided and noted- nasal cannula oxygen    .   Signs/symptoms of respiratory failure include- respiratory distress. Contributing diagnoses includes -  complete heart block with hypotension  Labs and images were reviewed. Patient Has not had a recent ABG. Will treat underlying causes and adjust management of respiratory failure   -wean supplemental oxygen

## 2024-09-15 NOTE — PLAN OF CARE
Problem: Physical Therapy  Goal: Physical Therapy Goal  Description: Goals to be met by: 10/3/24, pending clearance from TVP to allow mobility out of bed:    Patient will increase functional independence with mobility by performin.  Ind HEP  2.  Ind bed mobility  3.  Ind bed to chair transfer  4.  Ambulate 150' with Mod Ind with or without AD  5.  Up in chair 2 hours    Outcome: Progressing

## 2024-09-15 NOTE — ASSESSMENT & PLAN NOTE
ELKE is likely due to pre-renal azotemia due to intravascular volume depletion. Baseline creatinine is  1.4 . Most recent creatinine and eGFR are listed below.  Recent Labs     09/13/24  1342 09/14/24  0412 09/15/24  0427   CREATININE 2.3* 1.4 1.3   EGFRNORACEVR 20* 37* 40*        Plan  - ELKE is improving  - Avoid nephrotoxins and renally dose meds for GFR listed above  - Monitor urine output, serial BMP, and adjust therapy as needed  - suspect 2/2 poor perfusion in the setting of CHB, now improving

## 2024-09-15 NOTE — PLAN OF CARE
Problem: Adult Inpatient Plan of Care  Goal: Plan of Care Review  Outcome: Progressing  Goal: Patient-Specific Goal (Individualized)  Outcome: Progressing  Goal: Absence of Hospital-Acquired Illness or Injury  Outcome: Progressing  Goal: Optimal Comfort and Wellbeing  Outcome: Progressing     Problem: Wound  Goal: Optimal Coping  Outcome: Progressing  Goal: Optimal Functional Ability  Outcome: Progressing  Goal: Improved Oral Intake  Outcome: Progressing  Goal: Optimal Pain Control and Function  Outcome: Progressing  Goal: Skin Health and Integrity  Outcome: Progressing  Goal: Optimal Wound Healing  Outcome: Progressing     Problem: Fall Injury Risk  Goal: Absence of Fall and Fall-Related Injury  Outcome: Progressing     Problem: Skin Injury Risk Increased  Goal: Skin Health and Integrity  Outcome: Progressing     Problem: Mechanical Ventilation Invasive  Goal: Optimal Nutrition Delivery  Outcome: Progressing     Problem: Acute Kidney Injury/Impairment  Goal: Fluid and Electrolyte Balance  Outcome: Progressing  Goal: Improved Oral Intake  Outcome: Progressing  Goal: Effective Renal Function  Outcome: Progressing     Problem: Wound  Goal: Absence of Infection Signs and Symptoms  Outcome: Not Progressing     Problem: Infection  Goal: Absence of Infection Signs and Symptoms  Outcome: Not Progressing     Problem: Adult Inpatient Plan of Care  Goal: Readiness for Transition of Care  Outcome: Unable to Meet     Problem: Restraint, Nonviolent  Goal: Absence of Harm or Injury  Outcome: Met     Problem: Mechanical Ventilation Invasive  Goal: Effective Communication  Outcome: Met  Goal: Optimal Device Function  Outcome: Met  Goal: Mechanical Ventilation Liberation  Outcome: Met  Goal: Absence of Device-Related Skin and Tissue Injury  Outcome: Met  Goal: Absence of Ventilator-Induced Lung Injury  Outcome: Met

## 2024-09-16 LAB
ALBUMIN SERPL BCP-MCNC: 3.1 G/DL (ref 3.5–5.2)
ALP SERPL-CCNC: 66 U/L (ref 55–135)
ALT SERPL W/O P-5'-P-CCNC: 18 U/L (ref 10–44)
ANION GAP SERPL CALC-SCNC: 12 MMOL/L (ref 8–16)
AST SERPL-CCNC: 21 U/L (ref 10–40)
BACTERIA #/AREA URNS HPF: ABNORMAL /HPF
BASOPHILS # BLD AUTO: 0.05 K/UL (ref 0–0.2)
BASOPHILS NFR BLD: 0.3 % (ref 0–1.9)
BILIRUB SERPL-MCNC: 0.5 MG/DL (ref 0.1–1)
BILIRUB UR QL STRIP: NEGATIVE
BUN SERPL-MCNC: 38 MG/DL (ref 8–23)
CALCIUM SERPL-MCNC: 9.4 MG/DL (ref 8.7–10.5)
CHLORIDE SERPL-SCNC: 108 MMOL/L (ref 95–110)
CLARITY UR: CLEAR
CO2 SERPL-SCNC: 18 MMOL/L (ref 23–29)
COLOR UR: YELLOW
CREAT SERPL-MCNC: 1.6 MG/DL (ref 0.5–1.4)
DIFFERENTIAL METHOD BLD: ABNORMAL
EOSINOPHIL # BLD AUTO: 0.1 K/UL (ref 0–0.5)
EOSINOPHIL NFR BLD: 0.5 % (ref 0–8)
ERYTHROCYTE [DISTWIDTH] IN BLOOD BY AUTOMATED COUNT: 13.7 % (ref 11.5–14.5)
EST. GFR  (NO RACE VARIABLE): 31 ML/MIN/1.73 M^2
GLUCOSE SERPL-MCNC: 129 MG/DL (ref 70–110)
GLUCOSE UR QL STRIP: NEGATIVE
HCT VFR BLD AUTO: 34.7 % (ref 37–48.5)
HGB BLD-MCNC: 11.2 G/DL (ref 12–16)
HGB UR QL STRIP: ABNORMAL
IMM GRANULOCYTES # BLD AUTO: 0.1 K/UL (ref 0–0.04)
IMM GRANULOCYTES NFR BLD AUTO: 0.6 % (ref 0–0.5)
KETONES UR QL STRIP: NEGATIVE
LEUKOCYTE ESTERASE UR QL STRIP: ABNORMAL
LYMPHOCYTES # BLD AUTO: 2.8 K/UL (ref 1–4.8)
LYMPHOCYTES NFR BLD: 17.4 % (ref 18–48)
MCH RBC QN AUTO: 29.3 PG (ref 27–31)
MCHC RBC AUTO-ENTMCNC: 32.3 G/DL (ref 32–36)
MCV RBC AUTO: 91 FL (ref 82–98)
MICROSCOPIC COMMENT: ABNORMAL
MONOCYTES # BLD AUTO: 1.3 K/UL (ref 0.3–1)
MONOCYTES NFR BLD: 8.1 % (ref 4–15)
NEUTROPHILS # BLD AUTO: 11.6 K/UL (ref 1.8–7.7)
NEUTROPHILS NFR BLD: 73.1 % (ref 38–73)
NITRITE UR QL STRIP: NEGATIVE
NRBC BLD-RTO: 0 /100 WBC
PH UR STRIP: 5 [PH] (ref 5–8)
PLATELET # BLD AUTO: 208 K/UL (ref 150–450)
PMV BLD AUTO: 10.6 FL (ref 9.2–12.9)
POCT GLUCOSE: 123 MG/DL (ref 70–110)
POCT GLUCOSE: 126 MG/DL (ref 70–110)
POCT GLUCOSE: 131 MG/DL (ref 70–110)
POCT GLUCOSE: 150 MG/DL (ref 70–110)
POCT GLUCOSE: 158 MG/DL (ref 70–110)
POCT GLUCOSE: 162 MG/DL (ref 70–110)
POTASSIUM SERPL-SCNC: 4.2 MMOL/L (ref 3.5–5.1)
PROT SERPL-MCNC: 7 G/DL (ref 6–8.4)
PROT UR QL STRIP: NEGATIVE
RBC # BLD AUTO: 3.82 M/UL (ref 4–5.4)
RBC #/AREA URNS HPF: 0 /HPF (ref 0–4)
SODIUM SERPL-SCNC: 138 MMOL/L (ref 136–145)
SP GR UR STRIP: 1.01 (ref 1–1.03)
URN SPEC COLLECT METH UR: ABNORMAL
UROBILINOGEN UR STRIP-ACNC: NEGATIVE EU/DL
WBC # BLD AUTO: 15.84 K/UL (ref 3.9–12.7)
WBC #/AREA URNS HPF: 19 /HPF (ref 0–5)

## 2024-09-16 PROCEDURE — 63600175 PHARM REV CODE 636 W HCPCS: Performed by: HOSPITALIST

## 2024-09-16 PROCEDURE — 63600175 PHARM REV CODE 636 W HCPCS

## 2024-09-16 PROCEDURE — 99900035 HC TECH TIME PER 15 MIN (STAT)

## 2024-09-16 PROCEDURE — 51798 US URINE CAPACITY MEASURE: CPT

## 2024-09-16 PROCEDURE — 87040 BLOOD CULTURE FOR BACTERIA: CPT | Mod: 59 | Performed by: NURSE PRACTITIONER

## 2024-09-16 PROCEDURE — 85025 COMPLETE CBC W/AUTO DIFF WBC: CPT

## 2024-09-16 PROCEDURE — 25000003 PHARM REV CODE 250: Performed by: INTERNAL MEDICINE

## 2024-09-16 PROCEDURE — 20000000 HC ICU ROOM

## 2024-09-16 PROCEDURE — 81000 URINALYSIS NONAUTO W/SCOPE: CPT | Performed by: HOSPITALIST

## 2024-09-16 PROCEDURE — 92610 EVALUATE SWALLOWING FUNCTION: CPT

## 2024-09-16 PROCEDURE — 25000003 PHARM REV CODE 250: Performed by: HOSPITALIST

## 2024-09-16 PROCEDURE — 97535 SELF CARE MNGMENT TRAINING: CPT

## 2024-09-16 PROCEDURE — 87086 URINE CULTURE/COLONY COUNT: CPT | Performed by: HOSPITALIST

## 2024-09-16 PROCEDURE — 36415 COLL VENOUS BLD VENIPUNCTURE: CPT

## 2024-09-16 PROCEDURE — 94761 N-INVAS EAR/PLS OXIMETRY MLT: CPT

## 2024-09-16 PROCEDURE — 25000003 PHARM REV CODE 250

## 2024-09-16 PROCEDURE — 87088 URINE BACTERIA CULTURE: CPT | Performed by: HOSPITALIST

## 2024-09-16 PROCEDURE — 25000003 PHARM REV CODE 250: Performed by: FAMILY MEDICINE

## 2024-09-16 PROCEDURE — 87186 SC STD MICRODIL/AGAR DIL: CPT | Performed by: HOSPITALIST

## 2024-09-16 PROCEDURE — 80053 COMPREHEN METABOLIC PANEL: CPT

## 2024-09-16 PROCEDURE — 51701 INSERT BLADDER CATHETER: CPT

## 2024-09-16 PROCEDURE — 27000221 HC OXYGEN, UP TO 24 HOURS

## 2024-09-16 RX ORDER — ENOXAPARIN SODIUM 100 MG/ML
40 INJECTION SUBCUTANEOUS EVERY 24 HOURS
Status: DISCONTINUED | OUTPATIENT
Start: 2024-09-16 | End: 2024-09-16

## 2024-09-16 RX ORDER — POLYETHYLENE GLYCOL 3350 17 G/17G
17 POWDER, FOR SOLUTION ORAL DAILY
Status: CANCELLED | OUTPATIENT
Start: 2024-09-16

## 2024-09-16 RX ORDER — ENOXAPARIN SODIUM 100 MG/ML
30 INJECTION SUBCUTANEOUS EVERY 24 HOURS
Status: DISCONTINUED | OUTPATIENT
Start: 2024-09-16 | End: 2024-09-19 | Stop reason: HOSPADM

## 2024-09-16 RX ORDER — FUROSEMIDE 10 MG/ML
20 INJECTION INTRAMUSCULAR; INTRAVENOUS ONCE
Status: COMPLETED | OUTPATIENT
Start: 2024-09-16 | End: 2024-09-16

## 2024-09-16 RX ORDER — POLYETHYLENE GLYCOL 3350 17 G/17G
17 POWDER, FOR SOLUTION ORAL DAILY
Status: DISCONTINUED | OUTPATIENT
Start: 2024-09-16 | End: 2024-09-19 | Stop reason: HOSPADM

## 2024-09-16 RX ORDER — DIPHENHYDRAMINE HCL 25 MG
25 CAPSULE ORAL ONCE
Status: COMPLETED | OUTPATIENT
Start: 2024-09-16 | End: 2024-09-16

## 2024-09-16 RX ADMIN — ENOXAPARIN SODIUM 30 MG: 30 INJECTION SUBCUTANEOUS at 05:09

## 2024-09-16 RX ADMIN — DIPHENHYDRAMINE HYDROCHLORIDE 25 MG: 25 CAPSULE ORAL at 01:09

## 2024-09-16 RX ADMIN — HEPARIN SODIUM 5000 UNITS: 5000 INJECTION INTRAVENOUS; SUBCUTANEOUS at 06:09

## 2024-09-16 RX ADMIN — POLYETHYLENE GLYCOL 3350 17 G: 17 POWDER, FOR SOLUTION ORAL at 01:09

## 2024-09-16 RX ADMIN — CEFTRIAXONE SODIUM 1 G: 1 INJECTION, POWDER, FOR SOLUTION INTRAMUSCULAR; INTRAVENOUS at 01:09

## 2024-09-16 RX ADMIN — ATORVASTATIN CALCIUM 80 MG: 40 TABLET, FILM COATED ORAL at 09:09

## 2024-09-16 RX ADMIN — MELATONIN TAB 3 MG 6 MG: 3 TAB at 08:09

## 2024-09-16 RX ADMIN — CHOLECALCIFEROL (VITAMIN D3) 10 MCG (400 UNIT) TABLET 2000 UNITS: at 09:09

## 2024-09-16 RX ADMIN — FUROSEMIDE 20 MG: 10 INJECTION, SOLUTION INTRAMUSCULAR; INTRAVENOUS at 10:09

## 2024-09-16 NOTE — PLAN OF CARE
Problem: SLP  Goal: SLP Goal  Description: Short Term Goals:  1. Pt will participate in a clinical swallow eval to determine least restrictive diet. -MET 9/16  2. Pt will safely tolerate >75% of REGULAR with no overt s/s of aspiration. -MET 9/16      Outcome: Met       Pt participated in clinical swallow eval this date. Pt is safe for Regular/thin po diet following standard swallow precautions:  -meds- whole  -No talking while eating  -alternate bites/sips  -small bites/sips  -1 bite/sip at a time  -HOB to 90 degrees during all po intake  -remain upright for 30 min s/p meals

## 2024-09-16 NOTE — ASSESSMENT & PLAN NOTE
S/p TVP  - hold BB for now, anticipate need for PPM but awaiting washout period  - monitor in ICU  - NPO at midnight for possible ppm pending fever workup  - Cardiology following

## 2024-09-16 NOTE — PLAN OF CARE
The sw met with the pt but she's too confused to complete the assessment. The sw spoke to Lisa Milligan([phone] via phone to complete the assessment. Henrietta states the pt has never been sick or in the hospital before. The pt was dx'd with dementia in April of this year. The pt lives alone in [place] but Henrietta checks on her morning and night. Henrietta used to stay with the pt 2 nights a week after her dx but stopped b/c the pt was doing fairly well. The pt's independent with her ADL's and doesn't use dme. The pt doesn't drive so Henrietta transports her to dr fischer's and errands. Henrietta an only child and wants the pt to go to snf after d/c. She doesn't want the pt to go to [facility] after d/c because the pt had a sister that  there while receiving therapy. It has left a bad vibe for [facility]. The sw acknowledged her feelings and emailed her a snf list to her at [email or web address]. She and her aunt will review the list and give the sw their 3 choice in order of preference. The sw completed the white board in the pt's room with her name and contact list. The sw left a d/c brochure at bedside for Henrietta and gave her the contact info. The sw encouraged her to call if she has any further questions or concerns. The sw will continue to follow the pot throughout her transitions of care and will assist with any d/c needs.     [facility] - Intensive Care  Initial Discharge Assessment       Primary Care Provider: Maria C Guzman A.M., MD    Admission Diagnosis: CHB (complete heart block) [I44.2]  Chest pain [R07.9]  Endotracheally intubated [Z97.8]    Admission Date: 2024  Expected Discharge Date:     Transition of Care Barriers: (P) None    Payor: MEDICARE / Plan: MEDICARE PART A & B / Product Type: Government /     Extended Emergency Contact Information  Primary Emergency Contact: Milligan,Lisa   [place]  Mobile Phone: [phone]  Relation: Daughter    Discharge Plan A: (P) Skilled Nursing  Facility  Discharge Plan B: (P) Other (TBD)      [id number] - [place] - [address] AT [facility] [address]  [address] [place] [address]  Phone: [phone] Fax: [phone]    [facility]  [address]  [facility] [address]  Phone: [phone] Fax: [phone]    [id number] - [place] - [address] AT [facility] [address]  [address] [facility]  [place] [address]  Phone: [phone] Fax: [phone]      Initial Assessment (most recent)       Adult Discharge Assessment - 09/16/24 1752          Discharge Assessment    Assessment Type Discharge Planning Assessment (P)      Confirmed/corrected address, phone number and insurance Yes (P)      Confirmed Demographics Correct on Facesheet (P)      Source of Information family (P)      If unable to respond/provide information was family/caregiver contacted? Yes (P)      Contact Name/Number Lisa Milligan(dtr)[phone] (P)      When was your last doctors appointment? 08/26/24 (P)      Communicated LARISSA with patient/caregiver Date not available/Unable to determine (P)      Reason For Admission CHB (P)      People in Home alone (P)      Do you expect to return to your current living situation? No (P)      Do you have help at home or someone to help you manage your care at home? Yes (P)      Who are your caregiver(s) and their phone number(s)? Lisa Milligan(dtr)[phone] (P)      Prior to hospitilization cognitive status: Not Oriented to Time;Not Oriented to Place (P)      Current cognitive status: Not Oriented to Time;Not Oriented to Place (P)      Walking or Climbing Stairs Difficulty no (P)      Dressing/Bathing Difficulty yes (P)      Dressing/Bathing bathing difficulty, assistance 1 person (P)      Home Accessibility wheelchair accessible (P)      Home Layout Able to live on 1st floor (P)      Equipment Currently Used at Home none (P)       Readmission within 30 days? No (P)      Patient currently being followed by outpatient case management? No (P)      Do you currently have service(s) that help you manage your care at home? No (P)      Do you take prescription medications? Yes (P)      Do you have prescription coverage? Yes (P)      Coverage Medicare A/B, for Life Medicare Supplement (P)      Do you have any problems affording any of your prescribed medications? No (P)      Is the patient taking medications as prescribed? yes (P)      Who is going to help you get home at discharge? Lisa Milligan(dtr)[phone] (P)      How do you get to doctors appointments? family or friend will provide (P)      Are you on dialysis? No (P)      Do you take coumadin? No (P)      Discharge Plan A Skilled Nursing Facility (P)      Discharge Plan B Other (P)    TBD    DME Needed Upon Discharge  none (P)      Discharge Plan discussed with: Adult children (P)      Transition of Care Barriers None (P)         Physical Activity    On average, how many days per week do you engage in moderate to strenuous exercise (like a brisk walk)? Patient unable to answer (P)      On average, how many minutes do you engage in exercise at this level? Patient unable to answer (P)         Financial Resource Strain    How hard is it for you to pay for the very basics like food, housing, medical care, and heating? Patient unable to answer (P)         Housing Stability    In the last 12 months, was there a time when you were not able to pay the mortgage or rent on time? Patient unable to answer (P)      At any time in the past 12 months, were you homeless or living in a shelter (including now)? Patient unable to answer (P)         Transportation Needs    Has the lack of transportation kept you from medical appointments, meetings, work or from getting things needed for daily living? Patient unable to answer (P)         Food Insecurity    Within the past 12 months, you worried that your food  would run out before you got the money to buy more. Patient unable to answer (P)      Within the past 12 months, the food you bought just didn't last and you didn't have money to get more. Patient unable to answer (P)         Stress    Do you feel stress - tense, restless, nervous, or anxious, or unable to sleep at night because your mind is troubled all the time - these days? Patient unable to answer (P)         Social Isolation    How often do you feel lonely or isolated from those around you?  Patient unable to answer (P)         Alcohol Use    Q1: How often do you have a drink containing alcohol? Patient unable to answer (P)      Q2: How many drinks containing alcohol do you have on a typical day when you are drinking? Patient unable to answer (P)      Q3: How often do you have six or more drinks on one occasion? Patient unable to answer (P)         Utilities    In the past 12 months has the electric, gas, oil, or water company threatened to shut off services in your home? Patient unable to answer (P)         Health Literacy    How often do you need to have someone help you when you read instructions, pamphlets, or other written material from your doctor or pharmacy? Patient unable to respond (P)

## 2024-09-16 NOTE — PLAN OF CARE
Patient is alert, but confused. Oriented to place and self. Frequent reorientation provided. TVP in place--100% paced at 60 bpm. Site appears clean, dry, and intact. Tmax 99.2F. Increasing oxygen requirements throughout the day, weaned to 4L NC with stable saturations. Tolerating PO intake. Urine output adequate. Repositioned frequently. Skin protection interventions in place. Plan of care reviewed with patient and family.

## 2024-09-16 NOTE — PT/OT/SLP EVAL
"Speech Language Pathology Evaluation  Bedside Swallow    Patient Name:  Trudy Horvath   MRN:  [id number]  Admitting Diagnosis: CHB (complete heart block)    Recommendations:                 General Recommendations:  Follow-up not indicated  Diet recommendations:  Regular Diet - IDDSI Level 7, Thin liquids - IDDSI Level 0   Aspiration Precautions: 1 bite/sip at a time, Alternating bites/sips, Frequent oral care, HOB to 90 degrees, Meds whole 1 at a time, Remain upright 30 minutes post meal, and Small bites/sips   General Precautions: Standard, fall  Communication strategies:  none    Assessment:     Trudy Horvath is a 83 y.o. female with an SLP diagnosis of new filtrates in lungs. ST was consulted to r/o aspiration.     History:     Past Medical History:   Diagnosis Date    Hyperlipidemia     Hypertension     Increased glucose level     risk of diabetes    Macular degeneration (senile) of retina, unspecified     Osteoporosis, post-menopausal        Past Surgical History:   Procedure Laterality Date    BREAST BIOPSY      benign    INSERTION, PACEMAKER, TEMPORARY TRANSVENOUS N/A 2024    Procedure: Insertion, Pacemaker, Temporary Transvenous;  Surgeon: Bre Melgar MD;  Location: [facility] CATH LAB/EP;  Service: Cardiology;  Laterality: N/A;       Social History: Patient lives at home.    Prior Intubation HX:  -    Modified Barium Swallow: NA    Chest X-Rays: Unchanged cardiopulmonary status.     Prior diet: Regular/thin.    Subjective     Pt in bed awake and alert with family present during session. Pt was agreeable to participate in po trials with st.   Patient goals: "I just want my surgery to happen"     Pain/Comfort:  Pain Rating 1: 0/10    Respiratory Status: Room air    Objective:   Cognition/communication: Awake, alert, able to sustain attention and demonstrated timely processing and initiation throughout session. Oriented to name, , current place, current month/year and reasoning for current admission. " Able to follow all commands and fluently express all wants/needs during session. Able to recall events leading to current admission and other pmhx without difficulty. Also demonstrated good turn-taking and topic maintenance throughout session.     Oral Musculature Evaluation  Oral Musculature: WFL  Dentition: present and adequate  Secretion Management: adequate  Mucosal Quality: good  Mandibular Strength and Mobility: WFL  Oral Labial Strength and Mobility: WFL  Lingual Strength and Mobility: WFL  Volitional Swallow:  (timely swallow)  Voice Prior to PO Intake:  (clear; 100% intellgiible)    Bedside Swallow Eval:   Consistencies Assessed:  Thin liquids via cup sips x4, and straw sips x7  Puree spoonfuls of pudding x6  Solids bites of ryan crackers x4  ---pt with x1 episode of coughing 2/2 pt was talking while eating crackers. ST educated pt on standard swallow precautions.     Oral Phase:   WFL    Pharyngeal Phase:   no overt clinical signs/symptoms of aspiration  no overt clinical signs/symptoms of pharyngeal dysphagia    Compensatory Strategies  None    Treatment: Pt participated in clinical swallow eval this date. Pt with x1 episode of coughing s/p bite of ryan cracker 2/2 pt was talking while eating. ST educated pt on standard swallow precautions. Pt is safe to continue regular/thin po diet following standard swallow precautions:  -meds- whole  -NO talking during po intake  -small bites/sips  -alternate bites/sips  -HOB to 90 degrees during all po intake  -remain upright for 30 min s/p meals    *Please note silent aspiration cannot be ruled out at the bedside*      Goals:   Multidisciplinary Problems       SLP Goals       Not on file              Multidisciplinary Problems (Resolved)          Problem: SLP    Goal Priority Disciplines Outcome   SLP Goal   (Resolved)     SLP Met   Description: Short Term Goals:  1. Pt will participate in a clinical swallow eval to determine least restrictive diet. -MET  9/16  2. Pt will safely tolerate >75% of REGULAR with no overt s/s of aspiration. -MET 9/16                           Plan:     Patient to be seen:      Plan of Care expires:  10/16/24  Plan of Care reviewed with:  patient   SLP Follow-Up:  No       Discharge recommendations:  No Therapy Indicated   Barriers to Discharge:  None    Time Tracking:     SLP Treatment Date:   09/16/24  Speech Start Time:  1245  Speech Stop Time:  1310     Speech Total Time (min):  25 min    Billable Minutes: Eval Swallow and Oral Function 15 and Self Care/Home Management Training 10    09/16/2024

## 2024-09-16 NOTE — SUBJECTIVE & OBJECTIVE
Interval History:  low grade fever overnight and increasing O2 requirements. Denies any shortness of breath although with mild nonproductive cough. Did have urinary retention with brown urine following in and out. Holding off on PPM placement for right now pending fever workup.    Review of Systems  Objective:     Vital Signs (Most Recent):  Temp: 99.1 °F (37.3 °C) (09/16/24 1115)  Pulse: 60 (09/16/24 1100)  Resp: (!) 26 (09/16/24 1100)  BP: 139/82 (09/16/24 1100)  SpO2: (!) 89 % (09/16/24 1100) Vital Signs (24h Range):  Temp:  [98.5 °F (36.9 °C)-100.9 °F (38.3 °C)] 99.1 °F (37.3 °C)  Pulse:  [57-61] 60  Resp:  [19-51] 26  SpO2:  [78 %-95 %] 89 %  BP: ()/(44-82) 139/82     Weight: 49 kg (108 lb)  Body mass index is 20.41 kg/m².    Intake/Output Summary (Last 24 hours) at 9/16/2024 1141  Last data filed at 9/16/2024 1100  Gross per 24 hour   Intake 289.95 ml   Output 200 ml   Net 89.95 ml         Physical Exam  Vitals and nursing note reviewed.   HENT:      Head: Normocephalic and atraumatic.      Mouth/Throat:      Mouth: Mucous membranes are moist.   Cardiovascular:      Comments: Pacing with TVP  Pulmonary:      Comments: tachypnea  Abdominal:      General: Bowel sounds are normal. There is no distension.      Palpations: Abdomen is soft.      Tenderness: There is no abdominal tenderness. There is no guarding or rebound.   Musculoskeletal:      Right lower leg: No edema.      Left lower leg: No edema.   Neurological:      Mental Status: She is alert and oriented to person, place, and time.             Significant Labs: All pertinent labs within the past 24 hours have been reviewed.    Significant Imaging: I have reviewed all pertinent imaging results/findings within the past 24 hours.

## 2024-09-16 NOTE — PROGRESS NOTES
Pharmacist Renal Dose Adjustment Note    Trudy Horvath is a 83 y.o. female being treated with the medication enoxaparin    Patient Data:    Vital Signs (Most Recent):  Temp: 99.1 °F (37.3 °C) (09/16/24 1115)  Pulse: 60 (09/16/24 1100)  Resp: (!) 26 (09/16/24 1100)  BP: 139/82 (09/16/24 1100)  SpO2: (!) 89 % (09/16/24 1100) Vital Signs (72h Range):  Temp:  [96.7 °F (35.9 °C)-100.9 °F (38.3 °C)]   Pulse:  [57-70]   Resp:  [0-51]   BP: ()/(43-83)   SpO2:  [78 %-100 %]      Recent Labs   Lab 09/14/24  0412 09/15/24  0427 09/16/24  0351   CREATININE 1.4 1.3 1.6*     Serum creatinine: 1.6 mg/dL (H) 09/16/24 0351  Estimated creatinine clearance: 19.4 mL/min (A)    Medication:Enoxaparin dose: 40mg frequency daily will be changed to medication:enoxaparin dose:30mg frequency:daily    Pharmacist's Name: Ashely Mera  Pharmacist's Extension: [phone]

## 2024-09-16 NOTE — ASSESSMENT & PLAN NOTE
Patient with Hypoxic Respiratory failure which is Acute.  she is not on home oxygen. Supplemental oxygen was provided and noted- nasal cannula oxygen    .   Signs/symptoms of respiratory failure include- respiratory distress. Contributing diagnoses includes -  complete heart block with hypotension  Labs and images were reviewed. Patient Has not had a recent ABG. Will treat underlying causes and adjust management of respiratory failure   -wean supplemental oxygen  -worsening requirements today; repeat CXR  -give IV lasix

## 2024-09-16 NOTE — EICU
eICU Note :    Called by the [facility]:    Problem: Fever 100.3---100.9    Pertinent History and labs reviewed : 84 y/o F with heart block     CHB (complete heart block)    Hypertensive kidney disease with stage 3b chronic kidney disease    Diastolic dysfunction    Temporary transvenous cardiac pacemaker present    Acute respiratory failure with hypoxia and hypercarbia         Treatment /Intervention given: Tylenol 650 q6 PRN        Rehana Greco M.D  eICU Physician   9:38 PM   Blood sugar 170 , will order Insulin coverage with low  dose Insulin scale    Patient was seen and examined personally by me. I have discussed the plan and reviewed the resident's note and agree with the above physical exam findings including assessment and plan except as indicated below.    # sepsis with acute organ dysfunction 2/2 multifocal PNA  # acute hypoxic/hypercarbic respiratory failure on bipap  # HAVEN, transaminitis      Empiric Abx, Add azithro, f/u urine legionella, wean off bipap to possible venti mask  Aggressive pulm toiletting, duonebs ATC  MICU reconsulted given increased work of breathing on bipap  Advance directives discussed with son at bedside and Dr. Foy (family friend who is a pulm/crit doctor). Son understood critical state of patient and would not want aggressive measures such as CPR/intubation. DNR/DNI, OK with Abx. 20 min spent discussing advance directives

## 2024-09-16 NOTE — NURSING
Dr. Narayan notified of increasing oxygen requirements. Currently on 6L NC with O2 saturation 87-89%. Lasix given per MD order. Patient denies SOB or chest pain. No acute distress noted.

## 2024-09-16 NOTE — PROGRESS NOTES
"Pulm/CC Fellow Progress Note    Attending Physician: Soren Narayan,*    Date of Admit: 2024    Reason for Consult: Complete heart block/cardiogenic shock    History of Present Illness:  Pt intubated and sedated, unable to provide any HPI. Per chart review:   83F hx of HTN, HLD, Alzheimer's w/ lethargy per daughter, found to be unresponsive by pt's daughter. In complete heart block per EMS upon arrival. Hypotensive and bradycardic. Intubated in the ED and taken to the cath lab for emergent TVP.   Now admitted to the ICU s/p cutaneous pacing placement, intubated and sedated.     More hx per daughter: Patient was diagnosed with dementia/alzheimer a few months ago w/ main complaints being disorientation, memory loss, and abnormal behaviors. Daughter went to visit her on the day of presentation and noticed that the patient seemed "off" and wasn't performing her normal routine. She was having some diarrhea and endorsed feeling cold. The patient got into bed and and in an attempt to get out of bed and start walking the patient had profound weakness and started to fall with an increased feeling of being cold. EMS was then called.     Interval History:  Patient overnight had fever resolved w/ tylenol. This AM had leukocytosis and increasing O2 requirements post extubation c/f aspiration pneumonitis vs HAP. Getting blcx and treating empirically w/ CTX.     Objective:   Last 24 Hour Vital Signs:  BP  Min: 84/50  Max: 178/66  Temp  Av.5 °F (37.5 °C)  Min: 98.5 °F (36.9 °C)  Max: 100.9 °F (38.3 °C)  Pulse  Av  Min: 57  Max: 61  Resp  Av.4  Min: 20  Max: 51  SpO2  Av.4 %  Min: 78 %  Max: 95 %  Body mass index is 20.41 kg/m².  I/O last 3 completed shifts:  In: 359.9 [I.V.:359.9]  Out: 700 [Urine:700]    Physical Exam:  General: Thin habitus, Aox1 to self only, follows commands, NAD, resting comfortably in bed, on 2L NC  HEENT: AT/NC, oral and nasal mucosa moist.   Neck: Supple without JVD or " palpable LAD.   Cardiac: normal rate, regular rhythm, symmetric pulses in distal extremities.  Respiratory/Chest: CTAB, no wheezes, rales, rhonchi, Padding in place on R chest over TVP.  Abdomen: Soft, NT/ND. +BS. No SP tenderness   Extremities: No LE edema.   Neuro: no focal deficits on limited neuro exam    Assessment & Plan:    HTN, HLD, Alzheimer's w/ lethargy per daughter, found to be unresponsive. On arrival was in complete heart block, Hypotensive and bradycardic concern for cardiogenic shock. Temporary transvenous pacemaker placed on 9/13/24. Admitted to the ICU for close monitoring. Plan for permanent pacemaker placement.     CNS/Neuro:  - Previously intubated and sedated, extubated on 9/14  - Aox1 at baseline  - Delirium precautions in place, family at bedside, reported hx of Alzheimer's dementia     Cardiovascular:  3rd Degree AV Block  S/p Transvenous Cardiac Pacing  Essential Hypertension  Hyperlipidemia  - Pt found with HR in 30s and SBP in 80s per EMS  - Pt taken emergently to cath lab on 9/13 for temporary transvenous pacemaker insertion   - , Troponin peak of 0.336  - TTE (9/13) significant for hyperdynamic systolic function (EF 77%), moderate LVOT obstruction, and moderate MVR  - Pt currently hemodynamically stable, resolved bradycardia and hypotension, HR set at 60  - Holding home Amlodipine, Carvedilol, Irbesartan-HCTZ, and ASA  - restarting home atorvastatin 80 qd  - Plan for permanent pacemaker placement this week per Cardiology after bacteremia r/o     Respiratory:  Increasing oxygen requirements  Hypoxemia  C/f aspiration vs HAP vs Hypervolemia    - Pt previously intubated 9/13 for procedure, extubated 9/14 to 4L NC  -Weaned to 2L but required 6L this AM, does not normally use O2 at home   -C/f aspiration overnight w/ new lower right lower lobe changes vs HAP due to >48 hrs hospitalization, pt not hypervolemic on exam  - being covered w/ CTX (9/16-) for HAP coverage  - consider  broadening for atypicals if patient decompensates  - continue to wean O2 as tolerated       GI/Metabolic:  GI Ppx: N/A  Diet: npo pending slp evaluation   F: monitor I/Os   E: K>4, Mg>2, replete as needed       K 4.2    MG 1.9    PHOS 4.0     Renal:  Acute Kidney Injury  - Cr of 2.3 upon admission, baseline 0.8, the AM 1.6  - ELKE likely d/t pre-renal vs obstructive   - S/p lasix 20mg IV  - Monitor with daily CMP  - Renally dose meds, avoid nephrotoxic agents  Intake/Output Summary (Last 24 hours) at 9/16/2024 1426  Last data filed at 9/16/2024 1100  Gross per 24 hour   Intake 289.95 ml   Output 200 ml   Net 89.95 ml     Urinary retention  -Patient retaining urine  -Q6h bladder scans, pt has required intermittent in&out catheterization  -may benefit from hartman catheter placement if retention persists     Heme:  DVT Ppx: Lovenox 30  -HH stable but low entire admission  - may benefit from anemia w/u (iron studies, ferritin, b12, folate)  -monitor with daily CBC     Endo:   - A1c last measured (08/2024): 5.7%  - TSH wnl this admission  - BG in 170s yesterday, ordered low dose SSI, has not required any since that measurement     ID:  C/f aspiration vs HAP  Asymptomatic cystitis  - patient has leukocytosis 15.84 1x low grade fever 100.9 overnight,   -Patient has been having urinary hesitancy, no physical exam findings of cystitis, UA w/2+LE and occult blood  -low c/f cystitis as cause of acute findings but on CTX to cover empirically for HAP and cystitis  - cardiology ordered blood cultures, f/u     MSK:  Osteoporosis  - Previous DEXA scan showed T score of -2.7  - Holding home Denosumab, restarted home supplemental vitamin D      Code Status: Full code     Disposition: Admitted to the ICU, plan to permanent pacemaker placement    ICU Checklist  Feeding: NPO pending SLP evaluation  Analgesia/Sedation: PRN Tylenol   Sedation: N/A   Thrombo PPX: Lovenox 30  Head of Bed: >30 degrees  Ulcer (Stress) PPX: N/A  Glucose:  goal 140-180  SBT/SAT: N/A  Bowel Regimen: last BM 9/13, started glycolax qd    Indwelling catheter/Lines/Invasive devices: tcp, PIV  De-escalation ABX: CTX    CODE STATUS: Full  DISPO: Monitoring in ICU with temporary transvenous pacemaker    Kerry Carcamo MD   [employer] Internal Medicine PGY 1  09/16/2024 3:05 PM

## 2024-09-16 NOTE — NURSING
Pt's temperature came back greater than 100. Notified the hospitalist and EICU at 19:58. MD put in order for tylenol.

## 2024-09-16 NOTE — PLAN OF CARE
Pt and daughter educated on plan of care. Pt and daughter verbalizes understanding. BP stable. Pt NSR on tele, ventricular paced. Pt temp was trending up. MD ordered PRN tylenol for temp. Pt temp trended back down to normal. Pt sating on 4 L NC. Pt aao x 2, to self and location. Pt has dementia and intermittent confusion. Pt given benadryl to help manage insomnia.

## 2024-09-16 NOTE — ASSESSMENT & PLAN NOTE
- see ELKE  BMP reviewed- noted Estimated Creatinine Clearance: 19.4 mL/min (A) (based on SCr of 1.6 mg/dL (H)). according to latest data. Based on current GFR, CKD stage is stage 4 - GFR 15-29.  Monitor UOP and serial BMP and adjust therapy as needed. Renally dose meds. Avoid nephrotoxic medications and procedures.

## 2024-09-16 NOTE — PROGRESS NOTES
[facility] Medicine  Progress Note    Patient Name: Trudy Horvath  MRN: [id number]  Patient Class: IP- Inpatient   Admission Date: 9/13/2024  Length of Stay: 3 days  Attending Physician: Soren Narayan,*  Primary Care Provider: Maria C Guzman A.M., MD        Subjective:     Principal Problem:CHB (complete heart block)        HPI:  83 y.o. female with PMHx significant for Hyperlipidemia, Hypertension, dementia, CKD stage 3 was admitted for complete heart block s/p transvenous pacemaker, intubated on mechanical ventilation. Per daughter at bedside patient was in her usual state of health except for recently diagnosed with dementia. She went to eat with family at restaurant the day prior admission. She woke up with multiple episodes of vomiting diarrhea. Then suddenly she became very weak and was not able to walk. EMS was called, she was found to be in high degree heart block, transcutaneous pacemaker was placed and was transferred to the ER. In the ER she was found to be hypotensive with systolic in the 80s. She was brought to OR and have transvenous pacemaker placement, then transfer to ICU.    Overview/Hospital Course:  No notes on file    Interval History:  low grade fever overnight and increasing O2 requirements. Denies any shortness of breath although with mild nonproductive cough. Did have urinary retention with brown urine following in and out. Holding off on PPM placement for right now pending fever workup.    Review of Systems  Objective:     Vital Signs (Most Recent):  Temp: 99.1 °F (37.3 °C) (09/16/24 1115)  Pulse: 60 (09/16/24 1100)  Resp: (!) 26 (09/16/24 1100)  BP: 139/82 (09/16/24 1100)  SpO2: (!) 89 % (09/16/24 1100) Vital Signs (24h Range):  Temp:  [98.5 °F (36.9 °C)-100.9 °F (38.3 °C)] 99.1 °F (37.3 °C)  Pulse:  [57-61] 60  Resp:  [19-51] 26  SpO2:  [78 %-95 %] 89 %  BP: ()/(44-82) 139/82     Weight: 49 kg (108 lb)  Body mass index is 20.41 kg/m².    Intake/Output  Summary (Last 24 hours) at 9/16/2024 1141  Last data filed at 9/16/2024 1100  Gross per 24 hour   Intake 289.95 ml   Output 200 ml   Net 89.95 ml         Physical Exam  Vitals and nursing note reviewed.   HENT:      Head: Normocephalic and atraumatic.      Mouth/Throat:      Mouth: Mucous membranes are moist.   Cardiovascular:      Comments: Pacing with TVP  Pulmonary:      Comments: tachypnea  Abdominal:      General: Bowel sounds are normal. There is no distension.      Palpations: Abdomen is soft.      Tenderness: There is no abdominal tenderness. There is no guarding or rebound.   Musculoskeletal:      Right lower leg: No edema.      Left lower leg: No edema.   Neurological:      Mental Status: She is alert and oriented to person, place, and time.             Significant Labs: All pertinent labs within the past 24 hours have been reviewed.    Significant Imaging: I have reviewed all pertinent imaging results/findings within the past 24 hours.    Assessment/Plan:      * CHB (complete heart block)  S/p TVP  - hold BB for now, anticipate need for PPM but awaiting washout period  - monitor in ICU  - NPO at midnight for possible ppm pending fever workup  - Cardiology following      Acute respiratory failure with hypoxia and hypercarbia  Patient with Hypoxic Respiratory failure which is Acute.  she is not on home oxygen. Supplemental oxygen was provided and noted- nasal cannula oxygen    .   Signs/symptoms of respiratory failure include- respiratory distress. Contributing diagnoses includes -  complete heart block with hypotension  Labs and images were reviewed. Patient Has not had a recent ABG. Will treat underlying causes and adjust management of respiratory failure   -wean supplemental oxygen  -worsening requirements today; repeat CXR  -give IV lasix    Temporary transvenous cardiac pacemaker present  - noted      Diastolic dysfunction  -in setting of complete heart block  -IV lasix today      Hypertensive kidney  disease with stage 3b chronic kidney disease  - see ELKE  BMP reviewed- noted Estimated Creatinine Clearance: 19.4 mL/min (A) (based on SCr of 1.6 mg/dL (H)). according to latest data. Based on current GFR, CKD stage is stage 4 - GFR 15-29.  Monitor UOP and serial BMP and adjust therapy as needed. Renally dose meds. Avoid nephrotoxic medications and procedures.      VTE Risk Mitigation (From admission, onward)           Ordered     enoxaparin injection 40 mg  Every 24 hours         09/16/24 1142                    Discharge Planning   LARISSA:      Code Status: Not on file   Is the patient medically ready for discharge?:     Reason for patient still in hospital (select all that apply): Patient unstable, Patient new problem, and Patient trending condition               Critical care time spent on the evaluation and treatment of severe organ dysfunction, review of pertinent labs and imaging studies, discussions with consulting providers and discussions with patient/family: 35 minutes.      Soren Narayan MD  Department of Hospital Medicine   [facility] - Intensive Care

## 2024-09-17 LAB
ALBUMIN SERPL BCP-MCNC: 2.7 G/DL (ref 3.5–5.2)
ALP SERPL-CCNC: 56 U/L (ref 55–135)
ALT SERPL W/O P-5'-P-CCNC: 19 U/L (ref 10–44)
ANION GAP SERPL CALC-SCNC: 10 MMOL/L (ref 8–16)
AST SERPL-CCNC: 22 U/L (ref 10–40)
BASOPHILS # BLD AUTO: 0.02 K/UL (ref 0–0.2)
BASOPHILS NFR BLD: 0.2 % (ref 0–1.9)
BILIRUB SERPL-MCNC: 0.4 MG/DL (ref 0.1–1)
BUN SERPL-MCNC: 38 MG/DL (ref 8–23)
CALCIUM SERPL-MCNC: 8.8 MG/DL (ref 8.7–10.5)
CHLORIDE SERPL-SCNC: 108 MMOL/L (ref 95–110)
CO2 SERPL-SCNC: 21 MMOL/L (ref 23–29)
CREAT SERPL-MCNC: 1.4 MG/DL (ref 0.5–1.4)
DIFFERENTIAL METHOD BLD: ABNORMAL
EOSINOPHIL # BLD AUTO: 0.1 K/UL (ref 0–0.5)
EOSINOPHIL NFR BLD: 0.7 % (ref 0–8)
ERYTHROCYTE [DISTWIDTH] IN BLOOD BY AUTOMATED COUNT: 13.8 % (ref 11.5–14.5)
EST. GFR  (NO RACE VARIABLE): 37 ML/MIN/1.73 M^2
GLUCOSE SERPL-MCNC: 100 MG/DL (ref 70–110)
HCT VFR BLD AUTO: 29.9 % (ref 37–48.5)
HGB BLD-MCNC: 10 G/DL (ref 12–16)
IMM GRANULOCYTES # BLD AUTO: 0.06 K/UL (ref 0–0.04)
IMM GRANULOCYTES NFR BLD AUTO: 0.5 % (ref 0–0.5)
LYMPHOCYTES # BLD AUTO: 1.5 K/UL (ref 1–4.8)
LYMPHOCYTES NFR BLD: 12.1 % (ref 18–48)
MCH RBC QN AUTO: 29.9 PG (ref 27–31)
MCHC RBC AUTO-ENTMCNC: 33.4 G/DL (ref 32–36)
MCV RBC AUTO: 90 FL (ref 82–98)
MONOCYTES # BLD AUTO: 1.1 K/UL (ref 0.3–1)
MONOCYTES NFR BLD: 9.3 % (ref 4–15)
NEUTROPHILS # BLD AUTO: 9.3 K/UL (ref 1.8–7.7)
NEUTROPHILS NFR BLD: 77.2 % (ref 38–73)
NRBC BLD-RTO: 0 /100 WBC
OHS QRS DURATION: 146 MS
OHS QTC CALCULATION: 492 MS
PLATELET # BLD AUTO: 179 K/UL (ref 150–450)
PMV BLD AUTO: 11.4 FL (ref 9.2–12.9)
POCT GLUCOSE: 103 MG/DL (ref 70–110)
POCT GLUCOSE: 140 MG/DL (ref 70–110)
POCT GLUCOSE: 151 MG/DL (ref 70–110)
POCT GLUCOSE: 95 MG/DL (ref 70–110)
POTASSIUM SERPL-SCNC: 4 MMOL/L (ref 3.5–5.1)
PROT SERPL-MCNC: 6.3 G/DL (ref 6–8.4)
RBC # BLD AUTO: 3.34 M/UL (ref 4–5.4)
SODIUM SERPL-SCNC: 139 MMOL/L (ref 136–145)
WBC # BLD AUTO: 12.05 K/UL (ref 3.9–12.7)

## 2024-09-17 PROCEDURE — 25000003 PHARM REV CODE 250: Performed by: STUDENT IN AN ORGANIZED HEALTH CARE EDUCATION/TRAINING PROGRAM

## 2024-09-17 PROCEDURE — 25000003 PHARM REV CODE 250: Performed by: HOSPITALIST

## 2024-09-17 PROCEDURE — 99153 MOD SED SAME PHYS/QHP EA: CPT | Performed by: INTERNAL MEDICINE

## 2024-09-17 PROCEDURE — 85025 COMPLETE CBC W/AUTO DIFF WBC: CPT

## 2024-09-17 PROCEDURE — 80053 COMPREHEN METABOLIC PANEL: CPT

## 2024-09-17 PROCEDURE — 33208 INSRT HEART PM ATRIAL & VENT: CPT | Performed by: INTERNAL MEDICINE

## 2024-09-17 PROCEDURE — 36415 COLL VENOUS BLD VENIPUNCTURE: CPT

## 2024-09-17 PROCEDURE — 99900035 HC TECH TIME PER 15 MIN (STAT)

## 2024-09-17 PROCEDURE — 02H63JZ INSERTION OF PACEMAKER LEAD INTO RIGHT ATRIUM, PERCUTANEOUS APPROACH: ICD-10-PCS | Performed by: INTERNAL MEDICINE

## 2024-09-17 PROCEDURE — 93005 ELECTROCARDIOGRAM TRACING: CPT

## 2024-09-17 PROCEDURE — 63600175 PHARM REV CODE 636 W HCPCS: Performed by: INTERNAL MEDICINE

## 2024-09-17 PROCEDURE — 25000003 PHARM REV CODE 250: Performed by: INTERNAL MEDICINE

## 2024-09-17 PROCEDURE — 99152 MOD SED SAME PHYS/QHP 5/>YRS: CPT | Performed by: INTERNAL MEDICINE

## 2024-09-17 PROCEDURE — 11000001 HC ACUTE MED/SURG PRIVATE ROOM

## 2024-09-17 PROCEDURE — 99152 MOD SED SAME PHYS/QHP 5/>YRS: CPT | Mod: ,,, | Performed by: INTERNAL MEDICINE

## 2024-09-17 PROCEDURE — C1894 INTRO/SHEATH, NON-LASER: HCPCS | Performed by: INTERNAL MEDICINE

## 2024-09-17 PROCEDURE — 0JH606Z INSERTION OF PACEMAKER, DUAL CHAMBER INTO CHEST SUBCUTANEOUS TISSUE AND FASCIA, OPEN APPROACH: ICD-10-PCS | Performed by: INTERNAL MEDICINE

## 2024-09-17 PROCEDURE — 33208 INSRT HEART PM ATRIAL & VENT: CPT | Mod: KX,,, | Performed by: INTERNAL MEDICINE

## 2024-09-17 PROCEDURE — 25500020 PHARM REV CODE 255: Performed by: INTERNAL MEDICINE

## 2024-09-17 PROCEDURE — C1785 PMKR, DUAL, RATE-RESP: HCPCS | Performed by: INTERNAL MEDICINE

## 2024-09-17 PROCEDURE — 93010 ELECTROCARDIOGRAM REPORT: CPT | Mod: ,,, | Performed by: STUDENT IN AN ORGANIZED HEALTH CARE EDUCATION/TRAINING PROGRAM

## 2024-09-17 PROCEDURE — 63600175 PHARM REV CODE 636 W HCPCS: Performed by: HOSPITALIST

## 2024-09-17 PROCEDURE — C1898 LEAD, PMKR, OTHER THAN TRANS: HCPCS | Performed by: INTERNAL MEDICINE

## 2024-09-17 PROCEDURE — 02HK3JZ INSERTION OF PACEMAKER LEAD INTO RIGHT VENTRICLE, PERCUTANEOUS APPROACH: ICD-10-PCS | Performed by: INTERNAL MEDICINE

## 2024-09-17 DEVICE — LEAD NOVUS CAPSURE FIX 45CM: Type: IMPLANTABLE DEVICE | Site: HEART | Status: FUNCTIONAL

## 2024-09-17 DEVICE — LEAD 5076-52 MRI US RCMCRD
Type: IMPLANTABLE DEVICE | Site: HEART | Status: FUNCTIONAL
Brand: CAPSUREFIX NOVUS MRI™ SURESCAN®

## 2024-09-17 DEVICE — IPG W1DR01 AZURE XT DR MRI USA
Type: IMPLANTABLE DEVICE | Site: HEART | Status: FUNCTIONAL
Brand: AZURE™ XT DR MRI SURESCAN™

## 2024-09-17 RX ORDER — OXYCODONE HYDROCHLORIDE 5 MG/1
5 TABLET ORAL ONCE
Status: COMPLETED | OUTPATIENT
Start: 2024-09-17 | End: 2024-09-17

## 2024-09-17 RX ORDER — OXYCODONE HYDROCHLORIDE 5 MG/1
5 TABLET ORAL EVERY 8 HOURS PRN
Status: DISCONTINUED | OUTPATIENT
Start: 2024-09-17 | End: 2024-09-19 | Stop reason: HOSPADM

## 2024-09-17 RX ORDER — IODIXANOL 320 MG/ML
INJECTION, SOLUTION INTRAVASCULAR
Status: DISCONTINUED | OUTPATIENT
Start: 2024-09-17 | End: 2024-09-17 | Stop reason: HOSPADM

## 2024-09-17 RX ORDER — METOPROLOL TARTRATE 25 MG/1
25 TABLET, FILM COATED ORAL 2 TIMES DAILY
Status: DISCONTINUED | OUTPATIENT
Start: 2024-09-17 | End: 2024-09-18

## 2024-09-17 RX ORDER — FENTANYL CITRATE 50 UG/ML
INJECTION, SOLUTION INTRAMUSCULAR; INTRAVENOUS
Status: DISCONTINUED | OUTPATIENT
Start: 2024-09-17 | End: 2024-09-17 | Stop reason: HOSPADM

## 2024-09-17 RX ORDER — CEFAZOLIN SODIUM 1 G/3ML
INJECTION, POWDER, FOR SOLUTION INTRAMUSCULAR; INTRAVENOUS
Status: DISCONTINUED | OUTPATIENT
Start: 2024-09-17 | End: 2024-09-17 | Stop reason: HOSPADM

## 2024-09-17 RX ADMIN — ACETAMINOPHEN 650 MG: 325 TABLET ORAL at 01:09

## 2024-09-17 RX ADMIN — ATORVASTATIN CALCIUM 80 MG: 40 TABLET, FILM COATED ORAL at 09:09

## 2024-09-17 RX ADMIN — CHOLECALCIFEROL (VITAMIN D3) 10 MCG (400 UNIT) TABLET 2000 UNITS: at 09:09

## 2024-09-17 RX ADMIN — METOPROLOL TARTRATE 25 MG: 25 TABLET, FILM COATED ORAL at 01:09

## 2024-09-17 RX ADMIN — ENOXAPARIN SODIUM 30 MG: 30 INJECTION SUBCUTANEOUS at 05:09

## 2024-09-17 RX ADMIN — CEFTRIAXONE SODIUM 1 G: 1 INJECTION, POWDER, FOR SOLUTION INTRAMUSCULAR; INTRAVENOUS at 02:09

## 2024-09-17 RX ADMIN — OXYCODONE HYDROCHLORIDE 5 MG: 5 TABLET ORAL at 05:09

## 2024-09-17 NOTE — NURSING
Patient's daughter, Henrietta, states that her mother's insurance card and ID is missing. States that she was told it was placed in a patient's belongings bag and sent from ED to Cath lab and then brought with her to room 561 on 9/13/24 post TVP placement. Received patient from cath lab post PPM today and they state they sent the patient from Cath lab on 9/13/14 post TVP placement with her insurance card and ID. Security called and notified. Security called back to state that they had been notified once since 9/13/24 of patient's missing insurance and ID card and that they still haven't found it. Notified daughter, Henrietta.

## 2024-09-17 NOTE — ASSESSMENT & PLAN NOTE
S/p TVP  - hold BB for now, anticipate need for PPM but awaiting washout period  - Cardiology following  - PPM placed 9/17

## 2024-09-17 NOTE — SUBJECTIVE & OBJECTIVE
Interval History:  afebrile ON. S/p PPM today. C/o pain at surgical site. Otherwise doing well. Daughter at bedside.     Review of Systems  Objective:     Vital Signs (Most Recent):  Temp: 97.8 °F (36.6 °C) (09/17/24 1600)  Pulse: 83 (09/17/24 1730)  Resp: (!) 30 (09/17/24 1736)  BP: 124/60 (09/17/24 1715)  SpO2: 97 % (09/17/24 1730) Vital Signs (24h Range):  Temp:  [97.3 °F (36.3 °C)-99.5 °F (37.5 °C)] 97.8 °F (36.6 °C)  Pulse:  [] 83  Resp:  [16-50] 30  SpO2:  [88 %-98 %] 97 %  BP: ()/(45-89) 124/60     Weight: 49 kg (108 lb)  Body mass index is 20.41 kg/m².    Intake/Output Summary (Last 24 hours) at 9/17/2024 1751  Last data filed at 9/17/2024 1512  Gross per 24 hour   Intake 773.24 ml   Output 400 ml   Net 373.24 ml         Physical Exam  Vitals and nursing note reviewed.   HENT:      Mouth/Throat:      Mouth: Mucous membranes are moist.   Cardiovascular:      Comments: Left arm in sling  Pulmonary:      Effort: Pulmonary effort is normal. No respiratory distress.   Abdominal:      General: There is no distension.      Palpations: Abdomen is soft.      Tenderness: There is no abdominal tenderness.   Musculoskeletal:      Right lower leg: No edema.      Left lower leg: No edema.   Neurological:      Mental Status: She is alert and oriented to person, place, and time.             Significant Labs: All pertinent labs within the past 24 hours have been reviewed.    Significant Imaging: I have reviewed all pertinent imaging results/findings within the past 24 hours.

## 2024-09-17 NOTE — ASSESSMENT & PLAN NOTE
ELKE is likely due to pre-renal azotemia due to intravascular volume depletion. Baseline creatinine is  1.4 . Most recent creatinine and eGFR are listed below.  Recent Labs     09/15/24  0427 09/16/24  0351 09/17/24  0413   CREATININE 1.3 1.6* 1.4   EGFRNORACEVR 40* 31* 37*        Plan  - ELKE is improving  - Avoid nephrotoxins and renally dose meds for GFR listed above  - Monitor urine output, serial BMP, and adjust therapy as needed  - suspect 2/2 poor perfusion in the setting of CHB, now improving

## 2024-09-17 NOTE — PLAN OF CARE
The sw called [facility] to complete the LOCET but was placed on a list top receive a return call due to high call volume.     12:06pm The sw received a call from [facility] to complete LOCET. The sw faxed the PASRR to hospitals.         09/17/24 4450   Post-Acute Status   Post-Acute Authorization Placement   Post-Acute Placement Status Pending state direction/certification   Discharge Plan   Discharge Plan A Skilled Nursing Facility   Discharge Plan B Home Health

## 2024-09-17 NOTE — PT/OT/SLP PROGRESS
Physical Therapy  Treatment Attempt Note      Patient Name:  Trudy Horvath   MRN:  [id number]    Patient not seen today secondary to pt with TVP. Pt was evaluated on 9/15/2024 and was independent with bed level mobility. Pt was not oriented. Pt remains with strict bed rest orders and increased O2 requirements from evaluation. Will follow-up once pacemaker is placed.

## 2024-09-17 NOTE — PROGRESS NOTES
"Pulm/CC Fellow Progress Note    Attending Physician: Jessica Limon MD    Date of Admit: 2024    Reason for Consult: Complete heart block/cardiogenic shock    History of Present Illness:  Pt intubated and sedated, unable to provide any HPI. Per chart review:   83F hx of HTN, HLD, Alzheimer's w/ lethargy per daughter, found to be unresponsive by pt's daughter. In complete heart block per EMS upon arrival. Hypotensive and bradycardic. Intubated in the ED and taken to the cath lab for emergent TVP.   Now admitted to the ICU s/p cutaneous pacing placement, intubated and sedated.     More hx per daughter: Patient was diagnosed with dementia/alzheimer a few months ago w/ main complaints being disorientation, memory loss, and abnormal behaviors. Daughter went to visit her on the day of presentation and noticed that the patient seemed "off" and wasn't performing her normal routine. She was having some diarrhea and endorsed feeling cold. The patient got into bed and and in an attempt to get out of bed and start walking the patient had profound weakness and started to fall with an increased feeling of being cold. EMS was then called.     Interval History:  No acute events overnight. Having the pacemaker placement with cardiology today. Not febrile overnight. Weaned to 1 L this AM, saturating 88-92%. Getting cxr post procedure to see if lung changes resolved.     Objective:   Last 24 Hour Vital Signs:  BP  Min: 92/45  Max: 143/73  Temp  Av.8 °F (37.1 °C)  Min: 97.3 °F (36.3 °C)  Max: 99.5 °F (37.5 °C)  Pulse  Av.2  Min: 60  Max: 69  Resp  Av.2  Min: 16  Max: 50  SpO2  Av.4 %  Min: 86 %  Max: 98 %  Body mass index is 20.41 kg/m².  I/O last 3 completed shifts:  In: 937 [P.O.:480; I.V.:359.9; IV Piggyback:97.1]  Out: 1000 [Urine:1000]    Physical Exam:  General: Thin habitus, Aox1 to self only, follows commands, NAD, resting comfortably in bed, on 2L NC  HEENT: AT/NC, oral and nasal mucosa moist. "   Neck: Supple without JVD or palpable LAD.   Cardiac: normal rate, regular rhythm, Holosystolic murmur from tcp, symmetric pulses in distal extremities.  Respiratory/Chest: CTAB, no wheezes, rales, rhonchi, Padding in place on R chest over TVP.  Abdomen: Soft, NT/ND. +BS. No SP tenderness   Extremities: No LE edema.   Neuro: no focal deficits on limited neuro exam    Assessment & Plan:    HTN, HLD, Alzheimer's w/ lethargy per daughter, found to be unresponsive. On arrival was in complete heart block, Hypotensive and bradycardic concern for cardiogenic shock. Temporary transvenous pacemaker placed on 9/13/24. Admitted to the ICU for close monitoring. C/f aspiration vs HAP so started on empiric CTX coverage. S/p pacemaker placement 9/17.    CNS/Neuro:  - Previously intubated and sedated, extubated on 9/14  - Aox1 at baseline  - Delirium precautions in place, family at bedside, reported hx of Alzheimer's dementia     Cardiovascular:  3rd Degree AV Block  S/p Transvenous Cardiac Pacing  Essential Hypertension  Hyperlipidemia  - Pt found with HR in 30s and SBP in 80s per EMS  - Pt taken emergently to cath lab on 9/13 for temporary transvenous pacemaker insertion   - , Troponin peak of 0.336  - TTE (9/13) significant for hyperdynamic systolic function (EF 77%), moderate LVOT obstruction, and moderate MVR  - Pt currently hemodynamically stable, resolved bradycardia and hypotension, HR set at 60  - Holding home Amlodipine, Carvedilol, Irbesartan-HCTZ, and ASA  - restarting home atorvastatin 80 qd  - s/p pacemaker      Respiratory:  Increasing oxygen requirements  Hypoxemia  C/f aspiration vs HAP vs Hypervolemia    - Pt previously intubated 9/13 for procedure, extubated 9/14 to 4L NC  -Weaned to 2L but required 6L, has been weaned to 1L this AM, does not normally use O2 at home   -C/f aspiration overnight w/ new lower right lower lobe changes vs HAP due to >48 hrs hospitalization, pt not hypervolemic on exam  - being  covered w/ CTX (9/16-) for HAP coverage  - consider broadening for atypicals if patient decompensates  - continue to wean O2 as tolerated       GI/Metabolic:  GI Ppx: N/A  -started glycolax qd due to not having BM, hasn't eaten since admission, monitor for BM  Diet: SLP cleared for thin regular diet   F: monitor I/Os   E: K>4, Mg>2, replete as needed    Renal:  Acute Kidney Injury  - Cr of 2.3 upon admission, baseline 0.8, the AM 1.4  - ELKE likely d/t pre-renal vs obstructive   - S/p lasix 20mg IV  - Monitor with daily CMP  - Renally dose meds, avoid nephrotoxic agents  Intake/Output Summary (Last 24 hours) at 9/17/2024 0721  Last data filed at 9/17/2024 0605  Gross per 24 hour   Intake 687.01 ml   Output 1000 ml   Net -312.99 ml   Estimated Creatinine Clearance: 22.2 mL/min (based on SCr of 1.4 mg/dL).   Urinary retention  -Patient retaining urine  -Q6h bladder scans, pt has required intermittent in&out catheterization  -may benefit from hartman catheter placement if retention persists     Heme:  DVT Ppx: Lovenox 30  -HH stable but low entire admission  - may benefit from anemia w/u (iron studies, ferritin, b12, folate)  -monitor with daily CBC     Endo:   - A1c last measured (08/2024): 5.7%  - TSH wnl this admission  - BG in 170s yesterday, ordered low dose SSI, has not required any since that measurement     ID:  C/f aspiration vs HAP  Asymptomatic cystitis  - patient had leukocytosis 15.84 1x low grade fever 100.9 overnight 9/16, in hospital >48hrs  -Today: Afebrile, leukocytosis resolved  -Patient has been having urinary hesitancy, no physical exam findings of cystitis, UA w/2+LE and occult blood  -low c/f cystitis as cause of acute findings but on CTX to cover empirically for HAP and suspected cystitis  -treating w/ 5 day course of CTX to cover for HAP (9/16-9/20)  -f/u bcx, f/u repeat cxr    MSK:  Osteoporosis  - Previous DEXA scan showed T score of -2.7  - Holding home Denosumab, restarted home supplemental  vitamin D      Code Status: Full code     Disposition: Admitted to the ICU, plan to permanent pacemaker placement    ICU Checklist  Feeding: thin regular diet  Analgesia/Sedation: PRN Tylenol   Sedation: N/A   Thrombo PPX: Lovenox 30  Head of Bed: >30 degrees  Ulcer (Stress) PPX: N/A  Glucose: goal 140-180  SBT/SAT: N/A  Bowel Regimen: last BM 9/13, started glycolax qd  Indwelling catheter/Lines/Invasive devices: tcp, PIV  De-escalation ABX: CTX 5 day course    CODE STATUS: Full  DISPO: Monitoring in ICU s/p pacemaker placement, oxygen weaning     Kerry Carcamo MD   [employer] Internal Medicine PGY 1  09/17/2024 3:05 PM

## 2024-09-17 NOTE — PLAN OF CARE
Problem: Adult Inpatient Plan of Care  Goal: Plan of Care Review  Outcome: Progressing  Goal: Optimal Comfort and Wellbeing  Outcome: Progressing     Problem: Wound  Goal: Optimal Coping  Outcome: Progressing     Problem: Infection  Goal: Absence of Infection Signs and Symptoms  Outcome: Progressing   -permanent pacemaker placed in cathlab  -vital signs remained stable   -no acute events noted  -glucose remained wdl  -diet advanced

## 2024-09-17 NOTE — ASSESSMENT & PLAN NOTE
Patient with Hypoxic Respiratory failure which is Acute.  she is not on home oxygen. Supplemental oxygen was provided and noted- nasal cannula oxygen    .   Signs/symptoms of respiratory failure include- respiratory distress. Contributing diagnoses includes -  complete heart block with hypotension  Labs and images were reviewed. Patient Has not had a recent ABG. Will treat underlying causes and adjust management of respiratory failure   -wean supplemental oxygen  -PRN lasix

## 2024-09-17 NOTE — CARE UPDATE
Interval H&P      83 year old female admitted to the hospital with syncope. She has a past medical history of  Hyperlipidemia, Hypertension, dementia, CKD stage 3 was admitted for complete heart block s/p transvenous pacemaker. Now in the cath lab for permanent pacemaker placement. Had fever with leukocytosis yesterday which has since resolved. Now proceeding with permanent pacemaker placement. Risks and benefits discussed with the patient and multiple family members.

## 2024-09-17 NOTE — PLAN OF CARE
The sw met with the pt's dtr Henrietta,sister Beulah and her brother-in-law Jakob to discuss the pt's d/c plan. The family was resistant to post acute placement but them changed their mind during the meeting. The pt's family gave their choices in order of preference [facility] and [facility]F. The sw faxed the pt's info to the snf's listed above via [employer].        09/17/24 1141   Post-Acute Status   Post-Acute Authorization Placement   Post-Acute Placement Status Patient List Provided   Discharge Plan   Discharge Plan A Skilled Nursing Facility   Discharge Plan B Home Health

## 2024-09-17 NOTE — PROGRESS NOTES
[facility] Medicine  Progress Note    Patient Name: Trudy Horvath  MRN: [id number]  Patient Class: IP- Inpatient   Admission Date: 9/13/2024  Length of Stay: 4 days  Attending Physician: Jessica Limon MD  Primary Care Provider: Maria C Guzman A.M., MD        Subjective:     Principal Problem:CHB (complete heart block)        HPI:  83 y.o. female with PMHx significant for Hyperlipidemia, Hypertension, dementia, CKD stage 3 was admitted for complete heart block s/p transvenous pacemaker, intubated on mechanical ventilation. Per daughter at bedside patient was in her usual state of health except for recently diagnosed with dementia. She went to eat with family at restaurant the day prior admission. She woke up with multiple episodes of vomiting diarrhea. Then suddenly she became very weak and was not able to walk. EMS was called, she was found to be in high degree heart block, transcutaneous pacemaker was placed and was transferred to the ER. In the ER she was found to be hypotensive with systolic in the 80s. She was brought to OR and have transvenous pacemaker placement, then transfer to ICU.    Overview/Hospital Course:  No notes on file    Interval History:  afebrile ON. S/p PPM today. C/o pain at surgical site. Otherwise doing well. Daughter at bedside.     Review of Systems  Objective:     Vital Signs (Most Recent):  Temp: 97.8 °F (36.6 °C) (09/17/24 1600)  Pulse: 83 (09/17/24 1730)  Resp: (!) 30 (09/17/24 1736)  BP: 124/60 (09/17/24 1715)  SpO2: 97 % (09/17/24 1730) Vital Signs (24h Range):  Temp:  [97.3 °F (36.3 °C)-99.5 °F (37.5 °C)] 97.8 °F (36.6 °C)  Pulse:  [] 83  Resp:  [16-50] 30  SpO2:  [88 %-98 %] 97 %  BP: ()/(45-89) 124/60     Weight: 49 kg (108 lb)  Body mass index is 20.41 kg/m².    Intake/Output Summary (Last 24 hours) at 9/17/2024 1751  Last data filed at 9/17/2024 1512  Gross per 24 hour   Intake 773.24 ml   Output 400 ml   Net 373.24 ml         Physical  Exam  Vitals and nursing note reviewed.   HENT:      Mouth/Throat:      Mouth: Mucous membranes are moist.   Cardiovascular:      Comments: Left arm in sling  Pulmonary:      Effort: Pulmonary effort is normal. No respiratory distress.   Abdominal:      General: There is no distension.      Palpations: Abdomen is soft.      Tenderness: There is no abdominal tenderness.   Musculoskeletal:      Right lower leg: No edema.      Left lower leg: No edema.   Neurological:      Mental Status: She is alert and oriented to person, place, and time.             Significant Labs: All pertinent labs within the past 24 hours have been reviewed.    Significant Imaging: I have reviewed all pertinent imaging results/findings within the past 24 hours.    Assessment/Plan:      * CHB (complete heart block)  S/p TVP  - hold BB for now, anticipate need for PPM but awaiting washout period  - Cardiology following  - PPM placed 9/17      Acute respiratory failure with hypoxia and hypercarbia  Patient with Hypoxic Respiratory failure which is Acute.  she is not on home oxygen. Supplemental oxygen was provided and noted- nasal cannula oxygen    .   Signs/symptoms of respiratory failure include- respiratory distress. Contributing diagnoses includes -  complete heart block with hypotension  Labs and images were reviewed. Patient Has not had a recent ABG. Will treat underlying causes and adjust management of respiratory failure   -wean supplemental oxygen  -PRN lasix    Temporary transvenous cardiac pacemaker present  - noted      Diastolic dysfunction  -in setting of complete heart block  -PRN diuresis      Hypertensive kidney disease with stage 3b chronic kidney disease  - see ELKE  BMP reviewed- noted Estimated Creatinine Clearance: 19.4 mL/min (A) (based on SCr of 1.6 mg/dL (H)). according to latest data. Based on current GFR, CKD stage is stage 4 - GFR 15-29.  Monitor UOP and serial BMP and adjust therapy as needed. Renally dose meds. Avoid  nephrotoxic medications and procedures.      VTE Risk Mitigation (From admission, onward)           Ordered     enoxaparin injection 30 mg  Every 24 hours         09/16/24 1144                    Discharge Planning   LARISSA:      Code Status: Not on file   Is the patient medically ready for discharge?:     Reason for patient still in hospital (select all that apply): Patient trending condition and Consult recommendations  Discharge Plan A: Skilled Nursing Facility            Critical care time spent on the evaluation and treatment of severe organ dysfunction, review of pertinent labs and imaging studies, discussions with consulting providers and discussions with patient/family: 36 minutes.      Jessica Limon MD  Department of [facility] Medicine   [facility] Intensive [facility]

## 2024-09-17 NOTE — CONSULTS
ID was asked to weigh in on safety of pacemaker placement based on recent fever. Low suspicion for infection based on chart review. She is currently getting pacemaker placed and I agree with this plan. For time sensitive consults such as this, it is better to page and talk to ID directly.

## 2024-09-17 NOTE — PLAN OF CARE
Discussed plan of care for the shift with daughter, verbalizes a understanding, questions answered, caregiver strain acknowledge.

## 2024-09-17 NOTE — BRIEF OP NOTE
Procedure performed:  Dual-chamber permanent pacemaker ([employer]tronic)    Indication for the procedure:   Complete heart block/dependent on temporary pacemaker      Description of the procedure:    The patient was brought to the cath lab and mild sedation was administered using IV fentanyl.  2 g of Ancef was given prior to the procedure.  Once the patient was sedated the left pectoral region was prepped under sterile measures.  Left brachial vein venogram was performed.  Using micropuncture technique and ultrasound guidance the left subclavian access was achieved in then a 0.035 wire was used to access into the right atrium.  Next, pocket was created in the left pectoral region and then the 0.035 wire was internalized into the pocket.  A 7 Thai short sheath was placed in the left subclavian vein.  A 2nd 0.035 wire was placed in the 7 Thai sheath to attain dual access of the left subclavian vein.  This then Thai sheath was then removed and 1 of the wire was used to put a new 7 Thai sheath in.  Next RV lead model 5076-52 was placed under fluoroscopic guidance into the RV apex more pointed towards the septum.  This lead was then screwed in place and once good sensing and pacing thresholds were achieved the 7 Thai sheath was peeled away.  The 2nd 0.035 wire was used to get another 7 Thai sheath into the left subclavian vein, right atrial lead model 5076-45 was placed into the right atrial appendage and screwed in place.  Once good sensing and pacing thresholds were achieved, the right atrial sheath was peeled away.  The right atrial and ventricular leads were then sutured in place using silk sutures.  Pocket was then flushed using antibiotic solution.  Generator was then hooked up to the right atrial and ventricular leads that had a model number [id number].  The pocket was then closed using 1 layer of interrupted 2-0 Vicryl sutures in 2 layers of continuous 3-0 Vicryl sutures.  Dermabond and Steri-Strips were  applied at the end.      Findings:    Successful placement of dual-chamber pacemaker placement via left subclavian vein access and generator in the left pectoral region.  Device parameters are as follows    Right atrium:  Lead Model number:  5076-45  Serial number:  [id number]  Impedance:  418 ohm  P-wave amplitude:  4 mV  Pacing threshold:  0.5 volt at pulse width of 0.4 millisecond    Right ventricle:  Lead Model number:  5076-52  Serial number:  [id number]  Impedance:  817 ohm  R-wave amplitude:  15 mV  Pacing threshold:  0.5 volt at pulse width of 0.4 millisecond    GENERATOR:    MODEL Number: [id number]  Serial number: [id number]    DEVICE SETTINGS:    Pacing mode: DDDR  Lower rate: 60   Upper tracking rate: 130  Upper activity rate: 130      Recommendations:    Wear left arm sling 24/7 for 2-3 days and then at night for 1 week afterwards  Keep wound dry with bandage on for a week.  Do not get the bandage wet for 1 week  After removing the arm sling do not lift the left arm above shoulder height for at least 1 month  See us in clinic in 1 week for wound check  Patient had atrial tachycardia/flutter at the time of the lead placement, continue to monitor.  May require beta blocker therapy

## 2024-09-17 NOTE — NURSING TRANSFER
Nursing Transfer Note      9/17/2024   1320    Nurse giving handoff:PATSY Hermosillo, RN  Nurse receiving handoff:LEOPOLDO Tee RN    Reason patient is being transferred: post PPM    Transfer From: cath lab    Transfer via bed    Transfer with cardiac monitoring    Transported by PATSY Hermosillo, RN    Transfer Vital Signs:  See flowsheet    Telemetry: yes, per ICU monitor  Order for Tele Monitor? Yes    Additional Lines: none    4eyes on Skin: yes, L upper chest wall PPM with Dsg, CDI; RIJ Dsg, CDI    Medicines sent: none    Any special needs or follow-up needed: post procedure v/s, neurovascular checks, neuro checks    Patient belongings transferred with patient: Yes, sling    Chart send with patient: Yes    Notified: daughter and family at bedside upon return to room 561    Patient reassessed at: 1320 on 9/17/24   1  Upon arrival to floor: cardiac monitor applied, patient oriented to room, call bell in reach, and bed in lowest position, ice pack applied to L upper chest wall incision site and sling applied to L arm.

## 2024-09-18 LAB
ALBUMIN SERPL BCP-MCNC: 2.8 G/DL (ref 3.5–5.2)
ALP SERPL-CCNC: 62 U/L (ref 55–135)
ALT SERPL W/O P-5'-P-CCNC: 20 U/L (ref 10–44)
ANION GAP SERPL CALC-SCNC: 14 MMOL/L (ref 8–16)
AST SERPL-CCNC: 25 U/L (ref 10–40)
BACTERIA UR CULT: ABNORMAL
BASOPHILS # BLD AUTO: 0.02 K/UL (ref 0–0.2)
BASOPHILS NFR BLD: 0.2 % (ref 0–1.9)
BILIRUB SERPL-MCNC: 0.5 MG/DL (ref 0.1–1)
BUN SERPL-MCNC: 33 MG/DL (ref 8–23)
CALCIUM SERPL-MCNC: 9.2 MG/DL (ref 8.7–10.5)
CHLORIDE SERPL-SCNC: 109 MMOL/L (ref 95–110)
CO2 SERPL-SCNC: 19 MMOL/L (ref 23–29)
CREAT SERPL-MCNC: 1.2 MG/DL (ref 0.5–1.4)
DIFFERENTIAL METHOD BLD: ABNORMAL
EOSINOPHIL # BLD AUTO: 0.2 K/UL (ref 0–0.5)
EOSINOPHIL NFR BLD: 1.7 % (ref 0–8)
ERYTHROCYTE [DISTWIDTH] IN BLOOD BY AUTOMATED COUNT: 13.7 % (ref 11.5–14.5)
EST. GFR  (NO RACE VARIABLE): 44 ML/MIN/1.73 M^2
GLUCOSE SERPL-MCNC: 109 MG/DL (ref 70–110)
HCT VFR BLD AUTO: 34.7 % (ref 37–48.5)
HGB BLD-MCNC: 11.2 G/DL (ref 12–16)
IMM GRANULOCYTES # BLD AUTO: 0.07 K/UL (ref 0–0.04)
IMM GRANULOCYTES NFR BLD AUTO: 0.7 % (ref 0–0.5)
LYMPHOCYTES # BLD AUTO: 1 K/UL (ref 1–4.8)
LYMPHOCYTES NFR BLD: 10.4 % (ref 18–48)
MAGNESIUM SERPL-MCNC: 2.2 MG/DL (ref 1.6–2.6)
MCH RBC QN AUTO: 29.1 PG (ref 27–31)
MCHC RBC AUTO-ENTMCNC: 32.3 G/DL (ref 32–36)
MCV RBC AUTO: 90 FL (ref 82–98)
MONOCYTES # BLD AUTO: 1 K/UL (ref 0.3–1)
MONOCYTES NFR BLD: 9.6 % (ref 4–15)
NEUTROPHILS # BLD AUTO: 7.7 K/UL (ref 1.8–7.7)
NEUTROPHILS NFR BLD: 77.4 % (ref 38–73)
NRBC BLD-RTO: 0 /100 WBC
PLATELET # BLD AUTO: 193 K/UL (ref 150–450)
PMV BLD AUTO: 10.8 FL (ref 9.2–12.9)
POCT GLUCOSE: 149 MG/DL (ref 70–110)
POTASSIUM SERPL-SCNC: 4.4 MMOL/L (ref 3.5–5.1)
PROT SERPL-MCNC: 7.1 G/DL (ref 6–8.4)
RBC # BLD AUTO: 3.85 M/UL (ref 4–5.4)
SODIUM SERPL-SCNC: 142 MMOL/L (ref 136–145)
WBC # BLD AUTO: 9.93 K/UL (ref 3.9–12.7)

## 2024-09-18 PROCEDURE — 63600175 PHARM REV CODE 636 W HCPCS: Performed by: HOSPITALIST

## 2024-09-18 PROCEDURE — 85025 COMPLETE CBC W/AUTO DIFF WBC: CPT

## 2024-09-18 PROCEDURE — 99233 SBSQ HOSP IP/OBS HIGH 50: CPT | Mod: ,,, | Performed by: NURSE PRACTITIONER

## 2024-09-18 PROCEDURE — 83735 ASSAY OF MAGNESIUM: CPT

## 2024-09-18 PROCEDURE — 97164 PT RE-EVAL EST PLAN CARE: CPT

## 2024-09-18 PROCEDURE — 25000003 PHARM REV CODE 250: Performed by: NURSE PRACTITIONER

## 2024-09-18 PROCEDURE — 80053 COMPREHEN METABOLIC PANEL: CPT

## 2024-09-18 PROCEDURE — 25000003 PHARM REV CODE 250: Performed by: FAMILY MEDICINE

## 2024-09-18 PROCEDURE — 11000001 HC ACUTE MED/SURG PRIVATE ROOM

## 2024-09-18 PROCEDURE — 25000003 PHARM REV CODE 250: Performed by: HOSPITALIST

## 2024-09-18 PROCEDURE — 25000003 PHARM REV CODE 250: Performed by: INTERNAL MEDICINE

## 2024-09-18 PROCEDURE — 36415 COLL VENOUS BLD VENIPUNCTURE: CPT

## 2024-09-18 PROCEDURE — 97116 GAIT TRAINING THERAPY: CPT

## 2024-09-18 PROCEDURE — 25000003 PHARM REV CODE 250

## 2024-09-18 RX ORDER — METOPROLOL TARTRATE 50 MG/1
50 TABLET ORAL 2 TIMES DAILY
Status: DISCONTINUED | OUTPATIENT
Start: 2024-09-18 | End: 2024-09-19 | Stop reason: HOSPADM

## 2024-09-18 RX ORDER — INSULIN ASPART 100 [IU]/ML
0-5 INJECTION, SOLUTION INTRAVENOUS; SUBCUTANEOUS
Status: DISCONTINUED | OUTPATIENT
Start: 2024-09-18 | End: 2024-09-19 | Stop reason: HOSPADM

## 2024-09-18 RX ORDER — METOPROLOL TARTRATE 50 MG/1
50 TABLET ORAL 2 TIMES DAILY
Start: 2024-09-18 | End: 2025-09-18

## 2024-09-18 RX ORDER — OXYCODONE HYDROCHLORIDE 5 MG/1
5 TABLET ORAL EVERY 8 HOURS PRN
Start: 2024-09-18 | End: 2024-09-19

## 2024-09-18 RX ORDER — AMOXICILLIN AND CLAVULANATE POTASSIUM 500; 125 MG/1; MG/1
1 TABLET, FILM COATED ORAL 2 TIMES DAILY
Start: 2024-09-18 | End: 2024-09-19 | Stop reason: HOSPADM

## 2024-09-18 RX ORDER — AMOXICILLIN AND CLAVULANATE POTASSIUM 500; 125 MG/1; MG/1
1 TABLET, FILM COATED ORAL 2 TIMES DAILY
Status: DISCONTINUED | OUTPATIENT
Start: 2024-09-18 | End: 2024-09-19

## 2024-09-18 RX ORDER — AMLODIPINE BESYLATE 10 MG/1
5 TABLET ORAL DAILY
Start: 2024-09-18 | End: 2024-09-27

## 2024-09-18 RX ORDER — AMOXICILLIN AND CLAVULANATE POTASSIUM 875; 125 MG/1; MG/1
1 TABLET, FILM COATED ORAL EVERY 12 HOURS
Status: DISCONTINUED | OUTPATIENT
Start: 2024-09-18 | End: 2024-09-18

## 2024-09-18 RX ADMIN — METOPROLOL TARTRATE 50 MG: 50 TABLET, FILM COATED ORAL at 08:09

## 2024-09-18 RX ADMIN — AMOXICILLIN AND CLAVULANATE POTASSIUM 500 MG: 500; 125 TABLET, FILM COATED ORAL at 08:09

## 2024-09-18 RX ADMIN — ENOXAPARIN SODIUM 30 MG: 30 INJECTION SUBCUTANEOUS at 06:09

## 2024-09-18 RX ADMIN — POLYETHYLENE GLYCOL 3350 17 G: 17 POWDER, FOR SOLUTION ORAL at 08:09

## 2024-09-18 RX ADMIN — ACETAMINOPHEN 650 MG: 325 TABLET ORAL at 09:09

## 2024-09-18 RX ADMIN — MELATONIN TAB 3 MG 6 MG: 3 TAB at 08:09

## 2024-09-18 RX ADMIN — ATORVASTATIN CALCIUM 80 MG: 40 TABLET, FILM COATED ORAL at 08:09

## 2024-09-18 RX ADMIN — AMOXICILLIN AND CLAVULANATE POTASSIUM 500 MG: 500; 125 TABLET, FILM COATED ORAL at 12:09

## 2024-09-18 RX ADMIN — CHOLECALCIFEROL (VITAMIN D3) 10 MCG (400 UNIT) TABLET 2000 UNITS: at 08:09

## 2024-09-18 RX ADMIN — ACETAMINOPHEN 650 MG: 325 TABLET ORAL at 08:09

## 2024-09-18 NOTE — ASSESSMENT & PLAN NOTE
Patient presented with CHB- rate 30s  CHB persisted despite BB washout  Now s/p PPM  BB resumed and up titrated  Augmentin BID x 1 week  Follow up with Dr Davis in 1 week for site check                                                      Dressing:   - Remove dressing 24 hours after procedure   - Leave site open to air    Activity:   - No heavy lifting or strenuous activity for one week   - No lifting of arm above the level of the shoulder for one week   - Wear the sling for 24 hours then reapply nightly for one week    Wound Care:   - Clean incision gently with soap and warm water   - Avoid vigorous rubbing, soaking or direct stream of water to the site for one week    Notify MD:   - temperature greater than 100.5   - increased redness to site, swelling, drainage or increased pain

## 2024-09-18 NOTE — SUBJECTIVE & OBJECTIVE
Interval History:  confused ON. Otherwise no acute issues. PT recommended moderate intensity therapy. Transfer orders in place.     Review of Systems  Objective:     Vital Signs (Most Recent):  Temp: 98.5 °F (36.9 °C) (09/18/24 1510)  Pulse: 84 (09/18/24 1500)  Resp: (!) 38 (09/18/24 1500)  BP: (!) 149/57 (09/18/24 1500)  SpO2: 98 % (09/18/24 1500) Vital Signs (24h Range):  Temp:  [97.8 °F (36.6 °C)-98.6 °F (37 °C)] 98.5 °F (36.9 °C)  Pulse:  [] 84  Resp:  [14-47] 38  SpO2:  [87 %-99 %] 98 %  BP: (105-168)/() 149/57     Weight: 49 kg (108 lb)  Body mass index is 20.41 kg/m².    Intake/Output Summary (Last 24 hours) at 9/18/2024 1525  Last data filed at 9/18/2024 1100  Gross per 24 hour   Intake 340 ml   Output 350 ml   Net -10 ml         Physical Exam  Vitals and nursing note reviewed.   HENT:      Mouth/Throat:      Mouth: Mucous membranes are moist.   Cardiovascular:      Comments: Left arm in sling  Pulmonary:      Effort: Pulmonary effort is normal. No respiratory distress.   Abdominal:      General: There is no distension.      Palpations: Abdomen is soft.      Tenderness: There is no abdominal tenderness.   Musculoskeletal:      Right lower leg: No edema.      Left lower leg: No edema.   Neurological:      Mental Status: She is alert and oriented to person, place, and time.             Significant Labs: All pertinent labs within the past 24 hours have been reviewed.    Significant Imaging: I have reviewed all pertinent imaging results/findings within the past 24 hours.

## 2024-09-18 NOTE — PLAN OF CARE
Problem: Fall Injury Risk  Goal: Absence of Fall and Fall-Related Injury  Outcome: Progressing     Problem: Skin Injury Risk Increased  Goal: Skin Health and Integrity  Outcome: Progressing     Problem: Infection  Goal: Absence of Infection Signs and Symptoms  Outcome: Progressing     Problem: Acute Kidney Injury/Impairment  Goal: Fluid and Electrolyte Balance  Outcome: Progressing     Problem: Acute Kidney Injury/Impairment  Goal: Improved Oral Intake  Outcome: Progressing     Problem: Adult Inpatient Plan of Care  Goal: Plan of Care Review  Outcome: Progressing

## 2024-09-18 NOTE — PROGRESS NOTES
[facility] Intensive Care  Cardiology  Progress Note    Patient Name: Trudy Horvath  MRN: [id number]  Admission Date: 9/13/2024  Hospital Length of Stay: 5 days  Code Status: No Order   Attending Physician: Jessica Limon MD   Primary Care Physician: Maria C Guzman A.M., MD  Expected Discharge Date:   Principal Problem:CHB (complete heart block)    Subjective:     Hospital Course:   09/13/2024 Per HPI   09/18/2024 S/p PPM without complication. Site WNL. Pending PT today. Lopressor up titrated. HR 90s-100s.     Past Medical History:   Diagnosis Date    Hyperlipidemia     Hypertension     Increased glucose level     risk of diabetes    Macular degeneration (senile) of retina, unspecified     Osteoporosis, post-menopausal        Past Surgical History:   Procedure Laterality Date    A-V CARDIAC PACEMAKER INSERTION N/A 9/17/2024    Procedure: INSERTION, CARDIAC PACEMAKER, DUAL CHAMBER;  Surgeon: Santiago Davis MD;  Location: [facility] CATH LAB/EP;  Service: Cardiology;  Laterality: N/A;    BREAST BIOPSY      benign    INSERTION, PACEMAKER, TEMPORARY TRANSVENOUS N/A 9/13/2024    Procedure: Insertion, Pacemaker, Temporary Transvenous;  Surgeon: Bre Melgar MD;  Location: [facility] CATH LAB/EP;  Service: Cardiology;  Laterality: N/A;       Review of patient's allergies indicates:  No Known Allergies    No current facility-administered medications on file prior to encounter.     Current Outpatient Medications on File Prior to Encounter   Medication Sig    amLODIPine (NORVASC) 10 MG tablet Take 1 tablet (10 mg total) by mouth once daily.    aspirin 81 MG Chew Take 1 tablet (81 mg total) by mouth once daily.    cholecalciferol, vitamin D3, (VITAMIN D3) 50 mcg (2,000 unit) Cap Take 1 capsule by mouth once daily.    denosumab (PROLIA) 60 mg/mL Syrg Inject 1 mL (60 mg total) into the skin every 6 (six) months. (Patient not taking: Reported on 8/26/2024)    docosahexanoic acid/vit C/lut (EYE HEALTH ORAL) Take 1 tablet by mouth once  daily.    irbesartan-hydrochlorothiazide (AVALIDE) 300-12.5 mg per tablet Take 1 tablet by mouth once daily.    rosuvastatin (CRESTOR) 20 MG tablet Take 1 tablet (20 mg total) by mouth once daily.     Family History       Problem Relation (Age of Onset)    Heart disease Mother    Hypertension Father    No Known Problems Sister, Daughter    Stroke Father          Tobacco Use    Smoking status: Never     Passive exposure: Never    Smokeless tobacco: Never   Substance and Sexual Activity    Alcohol use: No    Drug use: Never    Sexual activity: Not Currently     Review of Systems   Unable to perform ROS: Other   Constitutional: Positive for malaise/fatigue. Negative for fever.   HENT: Negative.     Eyes: Negative.    Cardiovascular:  Negative for chest pain, leg swelling, near-syncope, orthopnea, palpitations, paroxysmal nocturnal dyspnea and syncope.   Respiratory: Negative.  Negative for cough and shortness of breath.    Endocrine: Negative.    Hematologic/Lymphatic: Negative.    Musculoskeletal:  Positive for myalgias.   Gastrointestinal:  Negative for nausea and vomiting.   Genitourinary: Negative.    Neurological:  Positive for weakness.   Psychiatric/Behavioral: Negative.     Allergic/Immunologic: Negative.      Objective:     Vital Signs (Most Recent):  Temp: 98.4 °F (36.9 °C) (09/18/24 0730)  Pulse: 80 (09/18/24 1000)  Resp: (!) 27 (09/18/24 1000)  BP: 139/64 (09/18/24 1000)  SpO2: 98 % (09/18/24 1000) Vital Signs (24h Range):  Temp:  [97.8 °F (36.6 °C)-98.5 °F (36.9 °C)] 98.4 °F (36.9 °C)  Pulse:  [] 80  Resp:  [14-47] 27  SpO2:  [87 %-99 %] 98 %  BP: (105-168)/(54-84) 139/64     Weight: 49 kg (108 lb)  Body mass index is 20.41 kg/m².    SpO2: 98 %         Intake/Output Summary (Last 24 hours) at 9/18/2024 1102  Last data filed at 9/18/2024 0700  Gross per 24 hour   Intake 363.24 ml   Output 200 ml   Net 163.24 ml       Lines/Drains/Airways       Central Venous Catheter Line  Duration                   "Percutaneous Central Line - Cordis Internal Jugular Right -- days              Drain  Duration             Female External Urinary Catheter w/ Suction 09/16/24 1445 1 day              Peripheral Intravenous Line  Duration                  Midline Catheter - Single Lumen 09/13/24 1820 Right basilic vein (medial side of arm) other (see comments) 4 days         Peripheral IV - Single Lumen 09/17/24 1110 22 G Left;Posterior Hand <1 day                     Physical Exam  Constitutional:       General: She is not in acute distress.     Appearance: She is not diaphoretic.      Interventions: She is intubated.   HENT:      Head: Atraumatic.   Eyes:      General:         Right eye: No discharge.         Left eye: No discharge.   Cardiovascular:      Rate and Rhythm: Bradycardia present.      Heart sounds: Murmur heard.   Pulmonary:      Effort: She is intubated.   Abdominal:      General: Bowel sounds are normal.      Palpations: Abdomen is soft.   Musculoskeletal:      Right lower leg: No edema.      Left lower leg: No edema.   Skin:     General: Skin is warm and dry.   Neurological:      Mental Status: She is alert.          Significant Labs: ABG:   No results for input(s): "PH", "PCO2", "HCO3", "POCSATURATED", "BE" in the last 48 hours.  , BMP:   Recent Labs   Lab 09/17/24  0413 09/18/24  0555    109    142   K 4.0 4.4    109   CO2 21* 19*   BUN 38* 33*   CREATININE 1.4 1.2   CALCIUM 8.8 9.2   MG  --  2.2   , CMP   Recent Labs   Lab 09/17/24  0413 09/18/24  0555    142   K 4.0 4.4    109   CO2 21* 19*    109   BUN 38* 33*   CREATININE 1.4 1.2   CALCIUM 8.8 9.2   PROT 6.3 7.1   ALBUMIN 2.7* 2.8*   BILITOT 0.4 0.5   ALKPHOS 56 62   AST 22 25   ALT 19 20   ANIONGAP 10 14   , CBC   Recent Labs   Lab 09/17/24  0413 09/18/24  0555   WBC 12.05 9.93   HGB 10.0* 11.2*   HCT 29.9* 34.7*    193   , INR No results for input(s): "INR", "PROTIME" in the last 48 hours., Lipid Panel No results " "for input(s): "CHOL", "HDL", "LDLCALC", "TRIG", "CHOLHDL" in the last 48 hours., Troponin No results for input(s): "TROPONINI" in the last 48 hours., and All pertinent lab results from the last 24 hours have been reviewed.    Significant Imaging: Echocardiogram: Transthoracic echo (TTE) complete (Cupid Only):   Results for orders placed or performed during the hospital encounter of 09/13/24   Echo   Result Value Ref Range    BSA 1.45 m2    Taylor's Biplane MOD Ejection Fraction 77 %    A2C EF 79 %    A4C EF 76 %    LVOT stroke volume 284.95 cm3    LVIDd 3.15 (A) 3.5 - 6.0 cm    LV Systolic Volume 12.26 mL    LV Systolic Volume Index 8.5 mL/m2    LVIDs 1.97 (A) 2.1 - 4.0 cm    LV ESV A2C 37.28 mL    LV Diastolic Volume 39.57 mL    LV ESV A4C 51.10 mL    LV Diastolic Volume Index 27.29 mL/m2    LV EDV A2C 45.725874507300103 mL    LV EDV A4C 44.65 mL    Left Ventricular End Systolic Volume by Teichholz Method 12.26 mL    Left Ventricular End Diastolic Volume by Teichholz Method 39.57 mL    IVS 1.16 (A) 0.6 - 1.1 cm    LVOT diameter 1.81 cm    LVOT area 2.6 cm2    FS 37 28 - 44 %    Left Ventricle Relative Wall Thickness 0.63 cm    Posterior Wall 0.99 0.6 - 1.1 cm    LV mass 98.46 g    LV Mass Index 68 g/m2    MV Peak E Gerardo 1.25 m/s    TDI LATERAL 0.03 m/s    TDI SEPTAL 0.03 m/s    E/E' ratio 41.67 m/s    MV Peak A Gerardo 1.45 m/s    TR Max Gerardo 3.19 m/s    E/A ratio 0.86     E wave deceleration time 332.08 msec    MV "A" wave duration 159.377074627139963 msec    LV SEPTAL E/E' RATIO 41.67 m/s    LV LATERAL E/E' RATIO 41.67 m/s    LVOT peak gerardo 5.24 m/s    Left Ventricular Outflow Tract Mean Velocity 3.73 cm/s    Left Ventricular Outflow Tract Mean Gradient 63.13 mmHg    RV S' 13.38 cm/s    TAPSE 2.17 cm    LA size 3.10 cm    LA volume (mod) 44.99 cm3    LA Volume Index (Mod) 31.0 mL/m2    RA area length vol 20.93 mL    RA area 10.3 cm2    Right Atrium Volume Systolic 22.53 mL    Vn Nyquist MS 0.61 m/s    AV mean gradient " 11 mmHg    AV peak gradient 20 mmHg    Ao peak fercho 2.23 m/s    Ao VTI 48.90 cm    LVOT peak .80 cm    AV valve area 5.83 cm²    AV Velocity Ratio 2.35     AV index (prosthetic) 2.27     WALLY by Velocity Ratio 6.04 cm²    Radius 0.64 cm    Vn Nyquist 0.61 m/s    Mr max fercho 5.68 m/s    MR PISA EROA 0.28 cm2    MV mean gradient 5 mmHg    MV peak gradient 8 mmHg    MV stenosis pressure 1/2 time 96.30 ms    MV valve area p 1/2 method 2.28 cm2    MV valve area by continuity eq 6.26 cm2    MV VTI 45.5 cm    Triscuspid Valve Regurgitation Peak Gradient 41 mmHg    PV PEAK VELOCITY 1.33 m/s    PV peak gradient 7 mmHg    Pulmonary Valve Mean Velocity 0.83 m/s    Sinus 2.77 cm    STJ 2.53 cm    Ascending aorta 2.63 cm    IVC diameter 2.23 cm    Mean e' 0.03 m/s    ZLVIDS -2.56     ZLVIDD -3.31     LA area A4C 17.96 cm2    LA area A2C 15.48 cm2    Mitral Valve Heart Rate 60 bpm    Narrative      Left Ventricle: Mild septal thickening. There is concentric remodeling.   There is hyperdynamic systolic function with a visually estimated ejection   fraction of greater than 70%. Biplane (2D) method of discs ejection   fraction is 77%. Moderate LVOT obstruction at rest. Peak gradient around   60 mmHg    Right Ventricle: Normal right ventricular cavity size. Wall thickness   is normal. Systolic function is normal.    Left Atrium: Left atrium is dilated.    Mitral Valve: There is moderate posterior mitral annular calcification   present. There is mild to moderate regurgitation with a posterolateral   eccentriccally directed jet.    Tricuspid Valve: There is mild regurgitation.    IVC/SVC: Patient is ventilated, cannot use IVC diameter to estimate   right atrial pressure.       Assessment and Plan:     Brief HPI: Patient seen this morning on rounds without cardiac complaint. Slight confusion noted. POC discussed with patient and daughter at bedside.     * CHB (complete heart block)  Patient presented with CHB- rate 30s  CHB  persisted despite BB washout  Now s/p PPM  BB resumed and up titrated  Augmentin BID x 1 week  Follow up with Dr Davis in 1 week for site check   Ok to work with PT  No pneumothorax noted on CXR                                                     Dressing:   - Remove dressing 24 hours after procedure   - Leave site open to air    Activity:   - No heavy lifting or strenuous activity for one week   - No lifting of arm above the level of the shoulder for one week   - Wear the sling for 24 hours then reapply nightly for one week    Wound Care:   - Clean incision gently with soap and warm water   - Avoid vigorous rubbing, soaking or direct stream of water to the site for one week    Notify MD:   - temperature greater than 100.5   - increased redness to site, swelling, drainage or increased pain                      Diastolic dysfunction  TTE    Left Ventricle: Mild septal thickening. There is concentric remodeling.   There is hyperdynamic systolic function with a visually estimated ejection   fraction of greater than 70%. Biplane (2D) method of discs ejection   fraction is 77%. Moderate LVOT obstruction at rest. Peak gradient around   60 mmHg    Right Ventricle: Normal right ventricular cavity size. Wall thickness   is normal. Systolic function is normal.    Left Atrium: Left atrium is dilated.    Mitral Valve: There is moderate posterior mitral annular calcification   present. There is mild to moderate regurgitation with a posterolateral   eccentriccally directed jet.    Tricuspid Valve: There is mild regurgitation.    IVC/SVC: Patient is ventilated, cannot use IVC diameter to estimate   right atrial pressure.    Euvolemic on exam  Continue BB- up titrate PRN    Hypertensive kidney disease with stage 3b chronic kidney disease  Hypotensive on admission with BP 80s/40s- improved at this time  BB resumed post PPM placement           VTE Risk Mitigation (From admission, onward)           Ordered     enoxaparin injection  30 mg  Every 24 hours         09/16/24 1144                    Jesse Vanegas NP  Cardiology  [facility] - Intensive Care

## 2024-09-18 NOTE — PROGRESS NOTES
[facility] Phone: [phone] [address] [facility] [place] [address] 9/17/2024 12:19 (CT)  9/17/2024 14:21 (CT)  -  9/17/2024 13:00 (CT)  9/17/2024 13:00 (CT)  Response: Yes, willing to accept patient       [facility] Phone: [phone] [address] [facility] [place] [address] 9/17/2024 12:19 (CT)  9/17/2024 14:21 (CT)  -  9/17/2024 13:28 (CT)  9/17/2024 13:28 (CT)  Response: Interested, but need more information  Comments: will continue to review until more stable, when need to have o2 @ 4l NC or below( I see this am required 6L )        [facility] Phone: [phone] [address] [place] [address] 9/17/2024 12:19 (CT)  9/17/2024 14:21 (CT)  -  9/17/2024 13:32 (CT)  9/17/2024 13:32 (CT)  Response: Referral Received  Comments: will need to review once stable

## 2024-09-18 NOTE — PLAN OF CARE
The sw faxed updates to [facility] and [facility] via [employer]. The sw left a voice message for Josiane at [facility] [phone] to return the call in reference to this pt. The sw spoke to the pt's dtr Henrietta and informed her of this info mentioned above. She wants to see if the pt will be accepted at [facility] or [facility] before agreeing to [facility].     1:30pm The sw received a call from Josiane with [facility] stating they have accepted the pt. She states they can admit the pt tomorrow if medically stable. She ask the sw to have the dr write the [facility] SN[facility] order today,if the pt will be ready for d/c tomorrow. The sw notified Dr. Limon of this info via secure chat. The sw met with Henrietta in the pt's room and notified her of this info. She's agreeable to [facility] if [facility] can't accept the pt and acknowledged it may be tomorrow if the pt's stable.           09/18/24 1115   Post-Acute Status   Post-Acute Authorization Placement   Post-Acute Placement Status Referrals Sent   Discharge Delays None known at this time   Discharge Plan   Discharge Plan A Skilled Nursing Facility   Discharge Plan B Home Health

## 2024-09-18 NOTE — PT/OT/SLP RE-EVAL
Physical Therapy Re-evaluation    Patient Name:  Trudy Horvath   MRN:  [id number]    Recommendations:     Discharge Recommendations: Moderate Intensity Therapy  Discharge Equipment Recommendations: to be determined by next level of care   Barriers to discharge: Decreased caregiver support and patient lives alone, requires assist with functional mobility    Assessment:     Trudy Horvath is a 83 y.o. female admitted with a medical diagnosis of CHB (complete heart block), s/p pacemaker placement. She presents with the following impairments/functional limitations: weakness, impaired endurance, impaired self care skills, impaired functional mobility, gait instability, impaired balance, impaired cognition, decreased coordination, decreased lower extremity function, decreased upper extremity function, decreased safety awareness, pain, impaired skin, impaired cardiopulmonary response to activity     Patient seen for PT re-evaluation, s/p pacemaker placement.  Patient now with L UE sling.  Precautions as follows:  Wear L UE sling x 2-3 days, then wear at night x 1 month.  No lifting L arm above shoulder height x 1 month.  Patient presents in supine, requesting to use restroom, daughter present.  Patient performs supine to sit with min assist, then sit to stand with min assist.  Patient able to take 3-4 steps to bedside commode with min assist and then 3-4 steps to bedside chair with min assist.  Physical therapy continues to recommend moderate intensity therapy at discharge..    Rehab Prognosis:  Good; patient would benefit from acute skilled PT services to address these deficits and reach maximum level of function.      Recent Surgery: Procedure(s) (LRB):  INSERTION, CARDIAC PACEMAKER, DUAL CHAMBER (N/A) 1 Day Post-Op    Plan:     During this hospitalization, patient to be seen 5 x/week to address the above listed problems via gait training, therapeutic activities, therapeutic exercises, neuromuscular re-education  Plan of Care  "Expires:  10/18/24  Plan of Care Reviewed with: patient, daughter    Subjective     Communicated with nurse prior to session.  Patient found supine with bed alarm, telemetry, SCD, peripheral IV, PureWick upon PT entry to room, agreeable to evaluation.      Chief Complaint: "I need to go to the bathroom"  Patient comments/goals: to return to PLOF  Pain/Comfort:  Pain Rating 1: 6/10  Location - Orientation 1: generalized  Location 1: chest  Pain Addressed 1: Reposition, Distraction, Nurse notified  Pain Rating Post-Intervention 1: 6/10    Patients cultural, spiritual, Quaker conflicts given the current situation: no      Objective:     Patient found with: bed alarm, telemetry, SCD, peripheral IV, PureWick     General Precautions: Standard, fall  Orthopedic Precautions: N/A  Braces: N/A  Respiratory Status: Nasal cannula, flow 3 L/min    Exams:  Cognitive Exam:  Patient is oriented to Person, Place, Time, Situation, and requires cueing for motor commands, pulling at tubes at times  Gross Motor Coordination:  WFL  Postural Exam:  Patient presented with the following abnormalities:    -       Rounded shoulders  -       Forward head  -       Kyphosis  Skin Integrity/Edema:      -       Skin integrity: Visible skin intact  -       Edema: None noted    RLE ROM: WFL  RLE Strength: Deficits: 3+ to 4/5  LLE ROM: WFL  LLE Strength: Deficits: 3+ to 4/5    Functional Mobility:  Bed Mobility:     Rolling Left:  minimum assistance  Scooting: minimum assistance  Supine to Sit: minimum assistance  Transfers:     Sit to Stand:  minimum assistance with no AD  Toilet Transfer: minimum assistance with  no AD  using  Step Transfer  Gait: Patient able to take 3-4 steps from bed to bedside commode, then BSC to bedside chair with min assist, L UE in sling  Balance: Seated EOB:  SBA, kyphosis   Standing: slightly unsteady, min assist    AM-PAC 6 CLICK MOBILITY  Total Score:16       Treatment and Education:   Patient and daughter educated " on precautions s/p Pacemaker placement.  Patient now with L UE sling x 2-3 days, then will need to wear L UE sling at night x 1 months.  Patient is not allowed to move UE above shoulder height x 1 month.  Patient performed above mobility, then performs self perineal hygiene after bowel movement on bedside commode.  Patient agrees to sit up in chair, alarm for chair set up due to cognition.      Patient left up in chair with all lines intact, call button in reach, chair alarm on, and nurse notified.    GOALS:   Multidisciplinary Problems       Physical Therapy Goals          Problem: Physical Therapy    Goal Priority Disciplines Outcome Goal Variances Interventions   Physical Therapy Goal     PT, PT/OT Progressing     Description: Goals to be met by: 10/3/24     Patient will increase functional independence with mobility by performin.  Ind HEP  2.  Ind bed mobility  3.  Ind bed to chair transfer  4.  Ambulate 150' with Mod Ind with or without AD  5.  Up in chair 2 hours                         History:     Past Medical History:   Diagnosis Date    Hyperlipidemia     Hypertension     Increased glucose level     risk of diabetes    Macular degeneration (senile) of retina, unspecified     Osteoporosis, post-menopausal        Past Surgical History:   Procedure Laterality Date    A-V CARDIAC PACEMAKER INSERTION N/A 2024    Procedure: INSERTION, CARDIAC PACEMAKER, DUAL CHAMBER;  Surgeon: Santiago Davis MD;  Location: [facility] CATH LAB/EP;  Service: Cardiology;  Laterality: N/A;    BREAST BIOPSY      benign    INSERTION, PACEMAKER, TEMPORARY TRANSVENOUS N/A 2024    Procedure: Insertion, Pacemaker, Temporary Transvenous;  Surgeon: Bre Melgar MD;  Location: [facility] CATH LAB/EP;  Service: Cardiology;  Laterality: N/A;       Time Tracking:     PT Received On: 24  PT Start Time: 1257     PT Stop Time: 1322  PT Total Time (min): 25 min     Billable Minutes: Re-eval 10 and Gait Training  15      09/18/2024

## 2024-09-18 NOTE — ASSESSMENT & PLAN NOTE
ELKE is likely due to pre-renal azotemia due to intravascular volume depletion. Baseline creatinine is  1.4 . Most recent creatinine and eGFR are listed below.  Recent Labs     09/16/24  0351 09/17/24  0413 09/18/24  0555   CREATININE 1.6* 1.4 1.2   EGFRNORACEVR 31* 37* 44*        Plan  - ELKE is improving  - Avoid nephrotoxins and renally dose meds for GFR listed above  - Monitor urine output, serial BMP, and adjust therapy as needed  - suspect 2/2 poor perfusion in the setting of CHB, now improving

## 2024-09-18 NOTE — NURSING TRANSFER
Nursing Transfer Note      9/18/2024   4:20 PM    Nurse giving handoff:KAUSHIK He  Nurse receiving handoff:KAUSHIK Tafoya    Reason patient is being transferred: ICU stepdown    Transfer To: TELE [id number]    Transfer via wheelchair    Transfer with  O2, cardiac monitoring    Transported by RN x2    Order for Tele Monitor? Yes    Additional Lines: Oxygen, purewick, sling in place to L arm     Medicines sent: N/A    Patient belongings transferred with patient: Yes; Rosary, 2 fragile figurines, flowers, purse, belongings in tote bag, blanket, [employer] ID card placed in pt tote bag     Chart send with patient: Yes    Notified: daughter Henrietta updated via telephone     Upon arrival to floor: cardiac monitor applied, patient oriented to room, call bell in reach, and bed in lowest position, new purewick placed and applied to suction; receiving RN aware of pt's arrival, Charge nurse RN at pt bedside; bed alarm on

## 2024-09-18 NOTE — ASSESSMENT & PLAN NOTE
S/p TVP  - hold BB, bradycardia persisted despite holding BB  - PPM placed 9/17 with no immediate complications  - augmentin BID X 7 days    - Cardiology following

## 2024-09-18 NOTE — PROGRESS NOTES
Pharmacist Renal Dose Adjustment Note    Trudy Horvath is a 83 y.o. female being treated with the medication AUGMENTIN    Patient Data:    Vital Signs (Most Recent):  Temp: 98.4 °F (36.9 °C) (09/18/24 0730)  Pulse: 74 (09/18/24 1100)  Resp: (!) 30 (09/18/24 1100)  BP: 136/66 (09/18/24 1100)  SpO2: 99 % (09/18/24 1100) Vital Signs (72h Range):  Temp:  [97.3 °F (36.3 °C)-100.9 °F (38.3 °C)]   Pulse:  []   Resp:  [14-51]   BP: ()/(44-99)   SpO2:  [78 %-99 %]      Recent Labs   Lab 09/16/24  0351 09/17/24  0413 09/18/24  0555   CREATININE 1.6* 1.4 1.2     Serum creatinine: 1.2 mg/dL 09/18/24 0555  Estimated creatinine clearance: 25.9 mL/min    Medication:AUGMENTIN dose: 875 mg frequency BID will be changed to medication:AUGMENTIN dose:500 mg frequency:BID    Pharmacist's Name: Clarence Wilkinson  Pharmacist's Extension: [phone]

## 2024-09-18 NOTE — PLAN OF CARE
Pt in Bed on 2L NC. Paced on tele monitor. No acute distress noted. Side rails x3, bed in lowest position, call bell within reach, pt advised to call for assistance. Maintained bed alarm for pt safety. Fall risk monitoring at bedside.

## 2024-09-18 NOTE — PLAN OF CARE
Patient seen for PT re-evaluation, s/p pacemaker placement.  Patient now with L UE sling.  Precautions as follows:  Wear L UE sling x 2-3 days, then wear at night x 1 month.  No lifting L arm above shoulder height x 1 month.  Patient presents in supine, requesting to use restroom, daughter present.  Patient performs supine to sit with min assist, then sit to stand with min assist.  Patient able to take 3-4 steps to bedside commode with min assist and then 3-4 steps to bedside chair with min assist.  Physical therapy continues to recommend moderate intensity therapy at discharge.      Problem: Physical Therapy  Goal: Physical Therapy Goal  Description: Goals to be met by: 10/3/24     Patient will increase functional independence with mobility by performin.  Ind HEP  2.  Ind bed mobility  3.  Ind bed to chair transfer  4.  Ambulate 150' with Mod Ind with or without AD  5.  Up in chair 2 hours    Outcome: Progressing

## 2024-09-18 NOTE — PLAN OF CARE
Pt disoriented to time and situation. Afebrile. HR & BP stable on monitor. Paced rhythm per PPM. O2 sats stable on 2L NC. No BM overnight. UO 200mL per purewick for the shift. Labs reviewed. Safety maintained. POC reviewed with pt. Care ongoing.    Problem: Adult Inpatient Plan of Care  Goal: Plan of Care Review  Outcome: Progressing  Goal: Patient-Specific Goal (Individualized)  Outcome: Progressing  Goal: Absence of Hospital-Acquired Illness or Injury  Outcome: Progressing  Goal: Optimal Comfort and Wellbeing  Outcome: Progressing  Goal: Readiness for Transition of Care  Outcome: Progressing     Problem: Wound  Goal: Optimal Coping  Outcome: Progressing  Goal: Optimal Functional Ability  Outcome: Progressing  Goal: Absence of Infection Signs and Symptoms  Outcome: Progressing  Goal: Improved Oral Intake  Outcome: Progressing  Goal: Optimal Pain Control and Function  Outcome: Progressing  Goal: Skin Health and Integrity  Outcome: Progressing  Goal: Optimal Wound Healing  Outcome: Progressing     Problem: Fall Injury Risk  Goal: Absence of Fall and Fall-Related Injury  Outcome: Progressing     Problem: Skin Injury Risk Increased  Goal: Skin Health and Integrity  Outcome: Progressing     Problem: Infection  Goal: Absence of Infection Signs and Symptoms  Outcome: Progressing     Problem: Acute Kidney Injury/Impairment  Goal: Fluid and Electrolyte Balance  Outcome: Progressing  Goal: Improved Oral Intake  Outcome: Progressing  Goal: Effective Renal Function  Outcome: Progressing

## 2024-09-18 NOTE — PLAN OF CARE
[facility]    FACILITY TRANSFER ORDERS      Patient Name: Trudy Horvath  YOB: 1940    PCP: Maria C Guzman A.M., MD   PCP Address: [address] [place] [address] / [place] [address]  PCP Phone Number: [phone]  PCP Fax: [phone]    Encounter Date: 09/19/2024    Admit to: SNF    Vital Signs:  Routine    Diagnoses:   Active Hospital Problems    Diagnosis  POA    *CHB (complete heart block) [I44.2]  Yes    Temporary transvenous cardiac pacemaker present [Z95.0]  Yes    Acute respiratory failure with hypoxia and hypercarbia [J96.01, J96.02]  Yes    Diastolic dysfunction [I51.89]  Yes    Hypertensive kidney disease with stage 3b chronic kidney disease [I12.9, N18.32]  Yes      Resolved Hospital Problems    Diagnosis Date Resolved POA    ELKE (acute kidney injury) [N17.9] 09/15/2024 Yes    Chest pain [R07.9] 09/15/2024 Yes    Endotracheally intubated [Z97.8] 09/15/2024 Yes       Allergies:Review of patient's allergies indicates:  No Known Allergies    Diet: cardiac diet    Activities: Activity as tolerated    Goals of Care Treatment Preferences:       Health care agent: [facility] care agent number: [phone]          What is most important right now is to focus on spending time at home, remaining as independent as possible.  Accordingly, we have decided that the best plan to meet the patient's goals includes continuing with treatment.      Nursing: routine     On 2 L NC, wean as tolerated    Dressing:         - Remove dressing 24 hours after procedure         - Leave site open to air     Activity:         - No heavy lifting or strenuous activity for one week         - No lifting of arm above the level of the shoulder for one week         - Wear the sling for 24 hours then reapply nightly for one week       Notify MD:         - temperature greater than 100.5         - increased redness to site, swelling, drainage or increased pain       CONSULTS:    Physical Therapy to evaluate and treat.   and Occupational Therapy to evaluate and treat.    MISCELLANEOUS CARE:  Routine Skin for Bedridden Patients: Apply moisture barrier cream to all skin folds and wet areas in perineal area daily and after baths and all bowel movements.    WOUND CARE ORDERS         - Clean incision gently with soap and warm water         - Avoid vigorous rubbing, soaking or direct stream of water to the site for one week    Medications: Review discharge medications with patient and family and provide education.         Medication List        START taking these medications      amoxicillin-clavulanate 875-125mg 875-125 mg per tablet  Commonly known as: AUGMENTIN  Take 1 tablet by mouth every 12 (twelve) hours. End date 9/24/24     metoprolol tartrate 50 MG tablet  Commonly known as: LOPRESSOR  Take 1 tablet (50 mg total) by mouth 2 (two) times daily.     oxyCODONE 5 MG immediate release tablet  Commonly known as: ROXICODONE  Take 1 tablet (5 mg total) by mouth every 12 (twelve) hours as needed (severe pain).            CHANGE how you take these medications      amLODIPine 10 MG tablet  Commonly known as: NORVASC  Take 0.5 tablets (5 mg total) by mouth once daily.  What changed: how much to take            CONTINUE taking these medications      aspirin 81 MG Chew  Take 1 tablet (81 mg total) by mouth once daily.     cholecalciferol (vitamin D3) 50 mcg (2,000 unit) Cap capsule  Commonly known as: VITAMIN D3  Take 1 capsule by mouth once daily.     EYE HEALTH ORAL  Take 1 tablet by mouth once daily.     rosuvastatin 20 MG tablet  Commonly known as: CRESTOR  Take 1 tablet (20 mg total) by mouth once daily.            STOP taking these medications      denosumab 60 mg/mL Syrg  Commonly known as: PROLIA     irbesartan-hydrochlorothiazide 300-12.5 mg per tablet  Commonly known as: AVALIDE                Immunizations Administered as of 9/19/2024       Name Date Dose VIS Date Route Exp Date    COVID-19, MRNA, LN-S, PF (Moderna) 2/26/2021 -- --  Intramuscular --    : Moderna [employer]     Lot: [id number]     Comment: Adminis     COVID-19, MRNA, LN-S, PF (Moderna) 1/29/2021 -- -- -- --    : Moderna [employer]     Lot: [id number]     Comment: Sylvia     COVID-19, MRNA, LN-S, PF (Pfizer) (Purple Cap) 12/28/2021 0.3 mL 5/10/2021 Intramuscular --    Site: Left deltoid     : Pf[employer]     Lot: [id number]     Comment: Sylvia                 _________________________________  Jessica Limon MD  09/19/2024

## 2024-09-18 NOTE — PROGRESS NOTES
[facility] Medicine  Progress Note    Patient Name: Trudy Horvath  MRN: [id number]  Patient Class: IP- Inpatient   Admission Date: 9/13/2024  Length of Stay: 5 days  Attending Physician: Jessica Limon MD  Primary Care Provider: Maria C Guzman A.M., MD        Subjective:     Principal Problem:CHB (complete heart block)        HPI:  83 y.o. female with PMHx significant for Hyperlipidemia, Hypertension, dementia, CKD stage 3 was admitted for complete heart block s/p transvenous pacemaker, intubated on mechanical ventilation. Per daughter at bedside patient was in her usual state of health except for recently diagnosed with dementia. She went to eat with family at restaurant the day prior admission. She woke up with multiple episodes of vomiting diarrhea. Then suddenly she became very weak and was not able to walk. EMS was called, she was found to be in high degree heart block, transcutaneous pacemaker was placed and was transferred to the ER. In the ER she was found to be hypotensive with systolic in the 80s. She was brought to OR and have transvenous pacemaker placement, then transfer to ICU.    Overview/Hospital Course:  No notes on file    Interval History:  confused ON. Otherwise no acute issues. PT recommended moderate intensity therapy. Transfer orders in place.     Review of Systems  Objective:     Vital Signs (Most Recent):  Temp: 98.5 °F (36.9 °C) (09/18/24 1510)  Pulse: 84 (09/18/24 1500)  Resp: (!) 38 (09/18/24 1500)  BP: (!) 149/57 (09/18/24 1500)  SpO2: 98 % (09/18/24 1500) Vital Signs (24h Range):  Temp:  [97.8 °F (36.6 °C)-98.6 °F (37 °C)] 98.5 °F (36.9 °C)  Pulse:  [] 84  Resp:  [14-47] 38  SpO2:  [87 %-99 %] 98 %  BP: (105-168)/() 149/57     Weight: 49 kg (108 lb)  Body mass index is 20.41 kg/m².    Intake/Output Summary (Last 24 hours) at 9/18/2024 1525  Last data filed at 9/18/2024 1100  Gross per 24 hour   Intake 340 ml   Output 350 ml   Net -10 ml         Physical  Exam  Vitals and nursing note reviewed.   HENT:      Mouth/Throat:      Mouth: Mucous membranes are moist.   Cardiovascular:      Comments: Left arm in sling  Pulmonary:      Effort: Pulmonary effort is normal. No respiratory distress.   Abdominal:      General: There is no distension.      Palpations: Abdomen is soft.      Tenderness: There is no abdominal tenderness.   Musculoskeletal:      Right lower leg: No edema.      Left lower leg: No edema.   Neurological:      Mental Status: She is alert and oriented to person, place, and time.             Significant Labs: All pertinent labs within the past 24 hours have been reviewed.    Significant Imaging: I have reviewed all pertinent imaging results/findings within the past 24 hours.    Assessment/Plan:      * CHB (complete heart block)  S/p TVP  - hold BB, bradycardia persisted despite holding BB  - PPM placed 9/17 with no immediate complications  - augmentin BID X 7 days    - Cardiology following      Acute respiratory failure with hypoxia and hypercarbia  Patient with Hypoxic Respiratory failure which is Acute.  she is not on home oxygen. Supplemental oxygen was provided and noted- nasal cannula oxygen    .   Signs/symptoms of respiratory failure include- respiratory distress. Contributing diagnoses includes -  complete heart block with hypotension  Labs and images were reviewed. Patient Has not had a recent ABG. Will treat underlying causes and adjust management of respiratory failure   -wean supplemental oxygen  -PRN lasix    Temporary transvenous cardiac pacemaker present  - noted      Diastolic dysfunction  -in setting of complete heart block  -PRN diuresis      Hypertensive kidney disease with stage 3b chronic kidney disease  - see ELKE  BMP reviewed- noted Estimated Creatinine Clearance: 19.4 mL/min (A) (based on SCr of 1.6 mg/dL (H)). according to latest data. Based on current GFR, CKD stage is stage 4 - GFR 15-29.  Monitor UOP and serial BMP and adjust  therapy as needed. Renally dose meds. Avoid nephrotoxic medications and procedures.      VTE Risk Mitigation (From admission, onward)           Ordered     enoxaparin injection 30 mg  Every 24 hours         09/16/24 1144                    Discharge Planning   LARISSA:      Code Status: Not on file   Is the patient medically ready for discharge?:     Reason for patient still in hospital (select all that apply): Treatment and Consult recommendations  Discharge Plan A: Skilled Nursing Facility   Discharge Delays: None known at this time    Stable for transfer to floor. Transfer order in place.       Jessica Limon MD  Department of [facility] Medicine   [facility] Intensive [facility] [place]

## 2024-09-18 NOTE — NURSING
Received report from ICU nurse, pt arrived on unit. VS taken. Antwan  applied. Instructed pt to call for assistance.

## 2024-09-18 NOTE — SUBJECTIVE & OBJECTIVE
Past Medical History:   Diagnosis Date    Hyperlipidemia     Hypertension     Increased glucose level     risk of diabetes    Macular degeneration (senile) of retina, unspecified     Osteoporosis, post-menopausal        Past Surgical History:   Procedure Laterality Date    A-V CARDIAC PACEMAKER INSERTION N/A 9/17/2024    Procedure: INSERTION, CARDIAC PACEMAKER, DUAL CHAMBER;  Surgeon: Santiago Davis MD;  Location: [facility] CATH LAB/EP;  Service: Cardiology;  Laterality: N/A;    BREAST BIOPSY      benign    INSERTION, PACEMAKER, TEMPORARY TRANSVENOUS N/A 9/13/2024    Procedure: Insertion, Pacemaker, Temporary Transvenous;  Surgeon: Bre Melgar MD;  Location: [facility] CATH LAB/EP;  Service: Cardiology;  Laterality: N/A;       Review of patient's allergies indicates:  No Known Allergies    No current facility-administered medications on file prior to encounter.     Current Outpatient Medications on File Prior to Encounter   Medication Sig    amLODIPine (NORVASC) 10 MG tablet Take 1 tablet (10 mg total) by mouth once daily.    aspirin 81 MG Chew Take 1 tablet (81 mg total) by mouth once daily.    cholecalciferol, vitamin D3, (VITAMIN D3) 50 mcg (2,000 unit) Cap Take 1 capsule by mouth once daily.    denosumab (PROLIA) 60 mg/mL Syrg Inject 1 mL (60 mg total) into the skin every 6 (six) months. (Patient not taking: Reported on 8/26/2024)    docosahexanoic acid/vit C/lut (EYE HEALTH ORAL) Take 1 tablet by mouth once daily.    irbesartan-hydrochlorothiazide (AVALIDE) 300-12.5 mg per tablet Take 1 tablet by mouth once daily.    rosuvastatin (CRESTOR) 20 MG tablet Take 1 tablet (20 mg total) by mouth once daily.     Family History       Problem Relation (Age of Onset)    Heart disease Mother    Hypertension Father    No Known Problems Sister, Daughter    Stroke Father          Tobacco Use    Smoking status: Never     Passive exposure: Never    Smokeless tobacco: Never   Substance and Sexual Activity    Alcohol use: No    Drug  use: Never    Sexual activity: Not Currently     Review of Systems   Unable to perform ROS: Other   Constitutional: Positive for malaise/fatigue. Negative for fever.   HENT: Negative.     Eyes: Negative.    Cardiovascular:  Negative for chest pain, leg swelling, near-syncope, orthopnea, palpitations, paroxysmal nocturnal dyspnea and syncope.   Respiratory: Negative.  Negative for cough and shortness of breath.    Endocrine: Negative.    Hematologic/Lymphatic: Negative.    Musculoskeletal:  Positive for myalgias.   Gastrointestinal:  Negative for nausea and vomiting.   Genitourinary: Negative.    Neurological:  Positive for weakness.   Psychiatric/Behavioral: Negative.     Allergic/Immunologic: Negative.      Objective:     Vital Signs (Most Recent):  Temp: 98.4 °F (36.9 °C) (09/18/24 0730)  Pulse: 80 (09/18/24 1000)  Resp: (!) 27 (09/18/24 1000)  BP: 139/64 (09/18/24 1000)  SpO2: 98 % (09/18/24 1000) Vital Signs (24h Range):  Temp:  [97.8 °F (36.6 °C)-98.5 °F (36.9 °C)] 98.4 °F (36.9 °C)  Pulse:  [] 80  Resp:  [14-47] 27  SpO2:  [87 %-99 %] 98 %  BP: (105-168)/(54-84) 139/64     Weight: 49 kg (108 lb)  Body mass index is 20.41 kg/m².    SpO2: 98 %         Intake/Output Summary (Last 24 hours) at 9/18/2024 1102  Last data filed at 9/18/2024 0700  Gross per 24 hour   Intake 363.24 ml   Output 200 ml   Net 163.24 ml       Lines/Drains/Airways       Central Venous Catheter Line  Duration                  Percutaneous Central Line - Cordis Internal Jugular Right -- days              Drain  Duration             Female External Urinary Catheter w/ Suction 09/16/24 1445 1 day              Peripheral Intravenous Line  Duration                  Midline Catheter - Single Lumen 09/13/24 1820 Right basilic vein (medial side of arm) other (see comments) 4 days         Peripheral IV - Single Lumen 09/17/24 1110 22 G Left;Posterior Hand <1 day                     Physical Exam  Constitutional:       General: She is not in  "acute distress.     Appearance: She is not diaphoretic.      Interventions: She is intubated.   HENT:      Head: Atraumatic.   Eyes:      General:         Right eye: No discharge.         Left eye: No discharge.   Cardiovascular:      Rate and Rhythm: Bradycardia present.      Heart sounds: Murmur heard.   Pulmonary:      Effort: She is intubated.   Abdominal:      General: Bowel sounds are normal.      Palpations: Abdomen is soft.   Musculoskeletal:      Right lower leg: No edema.      Left lower leg: No edema.   Skin:     General: Skin is warm and dry.   Neurological:      Mental Status: She is alert.          Significant Labs: ABG:   No results for input(s): "PH", "PCO2", "HCO3", "POCSATURATED", "BE" in the last 48 hours.  , BMP:   Recent Labs   Lab 09/17/24  0413 09/18/24  0555    109    142   K 4.0 4.4    109   CO2 21* 19*   BUN 38* 33*   CREATININE 1.4 1.2   CALCIUM 8.8 9.2   MG  --  2.2   , CMP   Recent Labs   Lab 09/17/24  0413 09/18/24  0555    142   K 4.0 4.4    109   CO2 21* 19*    109   BUN 38* 33*   CREATININE 1.4 1.2   CALCIUM 8.8 9.2   PROT 6.3 7.1   ALBUMIN 2.7* 2.8*   BILITOT 0.4 0.5   ALKPHOS 56 62   AST 22 25   ALT 19 20   ANIONGAP 10 14   , CBC   Recent Labs   Lab 09/17/24  0413 09/18/24  0555   WBC 12.05 9.93   HGB 10.0* 11.2*   HCT 29.9* 34.7*    193   , INR No results for input(s): "INR", "PROTIME" in the last 48 hours., Lipid Panel No results for input(s): "CHOL", "HDL", "LDLCALC", "TRIG", "CHOLHDL" in the last 48 hours., Troponin No results for input(s): "TROPONINI" in the last 48 hours., and All pertinent lab results from the last 24 hours have been reviewed.    Significant Imaging: Echocardiogram: Transthoracic echo (TTE) complete (Cupid Only):   Results for orders placed or performed during the hospital encounter of 09/13/24   Echo   Result Value Ref Range    BSA 1.45 m2    Taylor's Biplane MOD Ejection Fraction 77 %    A2C EF 79 %    A4C EF " "76 %    LVOT stroke volume 284.95 cm3    LVIDd 3.15 (A) 3.5 - 6.0 cm    LV Systolic Volume 12.26 mL    LV Systolic Volume Index 8.5 mL/m2    LVIDs 1.97 (A) 2.1 - 4.0 cm    LV ESV A2C 37.28 mL    LV Diastolic Volume 39.57 mL    LV ESV A4C 51.10 mL    LV Diastolic Volume Index 27.29 mL/m2    LV EDV A2C 45.608229956150528 mL    LV EDV A4C 44.65 mL    Left Ventricular End Systolic Volume by Teichholz Method 12.26 mL    Left Ventricular End Diastolic Volume by Teichholz Method 39.57 mL    IVS 1.16 (A) 0.6 - 1.1 cm    LVOT diameter 1.81 cm    LVOT area 2.6 cm2    FS 37 28 - 44 %    Left Ventricle Relative Wall Thickness 0.63 cm    Posterior Wall 0.99 0.6 - 1.1 cm    LV mass 98.46 g    LV Mass Index 68 g/m2    MV Peak E Gerardo 1.25 m/s    TDI LATERAL 0.03 m/s    TDI SEPTAL 0.03 m/s    E/E' ratio 41.67 m/s    MV Peak A Gerardo 1.45 m/s    TR Max Gerardo 3.19 m/s    E/A ratio 0.86     E wave deceleration time 332.08 msec    MV "A" wave duration 159.359867862497105 msec    LV SEPTAL E/E' RATIO 41.67 m/s    LV LATERAL E/E' RATIO 41.67 m/s    LVOT peak gerardo 5.24 m/s    Left Ventricular Outflow Tract Mean Velocity 3.73 cm/s    Left Ventricular Outflow Tract Mean Gradient 63.13 mmHg    RV S' 13.38 cm/s    TAPSE 2.17 cm    LA size 3.10 cm    LA volume (mod) 44.99 cm3    LA Volume Index (Mod) 31.0 mL/m2    RA area length vol 20.93 mL    RA area 10.3 cm2    Right Atrium Volume Systolic 22.53 mL    Vn Nyquist MS 0.61 m/s    AV mean gradient 11 mmHg    AV peak gradient 20 mmHg    Ao peak gerardo 2.23 m/s    Ao VTI 48.90 cm    LVOT peak .80 cm    AV valve area 5.83 cm²    AV Velocity Ratio 2.35     AV index (prosthetic) 2.27     WALLY by Velocity Ratio 6.04 cm²    Radius 0.64 cm    Vn Nyquist 0.61 m/s    Mr max gerardo 5.68 m/s    MR PISA EROA 0.28 cm2    MV mean gradient 5 mmHg    MV peak gradient 8 mmHg    MV stenosis pressure 1/2 time 96.30 ms    MV valve area p 1/2 method 2.28 cm2    MV valve area by continuity eq 6.26 cm2    MV VTI 45.5 cm    " Triscuspid Valve Regurgitation Peak Gradient 41 mmHg    PV PEAK VELOCITY 1.33 m/s    PV peak gradient 7 mmHg    Pulmonary Valve Mean Velocity 0.83 m/s    Sinus 2.77 cm    STJ 2.53 cm    Ascending aorta 2.63 cm    IVC diameter 2.23 cm    Mean e' 0.03 m/s    ZLVIDS -2.56     ZLVIDD -3.31     LA area A4C 17.96 cm2    LA area A2C 15.48 cm2    Mitral Valve Heart Rate 60 bpm    Narrative      Left Ventricle: Mild septal thickening. There is concentric remodeling.   There is hyperdynamic systolic function with a visually estimated ejection   fraction of greater than 70%. Biplane (2D) method of discs ejection   fraction is 77%. Moderate LVOT obstruction at rest. Peak gradient around   60 mmHg    Right Ventricle: Normal right ventricular cavity size. Wall thickness   is normal. Systolic function is normal.    Left Atrium: Left atrium is dilated.    Mitral Valve: There is moderate posterior mitral annular calcification   present. There is mild to moderate regurgitation with a posterolateral   eccentriccally directed jet.    Tricuspid Valve: There is mild regurgitation.    IVC/SVC: Patient is ventilated, cannot use IVC diameter to estimate   right atrial pressure.

## 2024-09-18 NOTE — PLAN OF CARE
The pt has been accepted by [facility],the pt's dtr wants her to go there instead of [facility] b/c it's closer to her house. The sw sent them Henrietta's contact info to arrange a time to sign admit paperwork tomorrow b/c the pt's ready for d/c tomorrow. Henrietta's in agreement and will wait for the call to sign the pt's admit paperwork. The sw faxed the pt's d/c orders to [facility] via [facility].        09/18/24 1639   Post-Acute Status   Post-Acute Authorization Placement   Discharge Plan   Discharge Plan A Skilled Nursing Facility   Discharge Plan B Skilled Nursing Facility

## 2024-09-18 NOTE — ASSESSMENT & PLAN NOTE
TTE    Left Ventricle: Mild septal thickening. There is concentric remodeling.   There is hyperdynamic systolic function with a visually estimated ejection   fraction of greater than 70%. Biplane (2D) method of discs ejection   fraction is 77%. Moderate LVOT obstruction at rest. Peak gradient around   60 mmHg    Right Ventricle: Normal right ventricular cavity size. Wall thickness   is normal. Systolic function is normal.    Left Atrium: Left atrium is dilated.    Mitral Valve: There is moderate posterior mitral annular calcification   present. There is mild to moderate regurgitation with a posterolateral   eccentriccally directed jet.    Tricuspid Valve: There is mild regurgitation.    IVC/SVC: Patient is ventilated, cannot use IVC diameter to estimate   right atrial pressure.    Euvolemic on exam  Continue BB- up titrate PRN

## 2024-09-19 VITALS
WEIGHT: 108 LBS | TEMPERATURE: 99 F | RESPIRATION RATE: 18 BRPM | SYSTOLIC BLOOD PRESSURE: 131 MMHG | OXYGEN SATURATION: 99 % | HEART RATE: 82 BPM | BODY MASS INDEX: 20.39 KG/M2 | HEIGHT: 61 IN | DIASTOLIC BLOOD PRESSURE: 78 MMHG

## 2024-09-19 LAB
ALBUMIN SERPL BCP-MCNC: 2.6 G/DL (ref 3.5–5.2)
ALP SERPL-CCNC: 60 U/L (ref 55–135)
ALT SERPL W/O P-5'-P-CCNC: 13 U/L (ref 10–44)
ANION GAP SERPL CALC-SCNC: 9 MMOL/L (ref 8–16)
AST SERPL-CCNC: 25 U/L (ref 10–40)
BASOPHILS # BLD AUTO: 0.02 K/UL (ref 0–0.2)
BASOPHILS NFR BLD: 0.2 % (ref 0–1.9)
BILIRUB SERPL-MCNC: 0.4 MG/DL (ref 0.1–1)
BUN SERPL-MCNC: 27 MG/DL (ref 8–23)
CALCIUM SERPL-MCNC: 8.8 MG/DL (ref 8.7–10.5)
CHLORIDE SERPL-SCNC: 110 MMOL/L (ref 95–110)
CO2 SERPL-SCNC: 23 MMOL/L (ref 23–29)
CREAT SERPL-MCNC: 1 MG/DL (ref 0.5–1.4)
DIFFERENTIAL METHOD BLD: ABNORMAL
EOSINOPHIL # BLD AUTO: 0.2 K/UL (ref 0–0.5)
EOSINOPHIL NFR BLD: 1.9 % (ref 0–8)
ERYTHROCYTE [DISTWIDTH] IN BLOOD BY AUTOMATED COUNT: 13.5 % (ref 11.5–14.5)
EST. GFR  (NO RACE VARIABLE): 55 ML/MIN/1.73 M^2
GLUCOSE SERPL-MCNC: 104 MG/DL (ref 70–110)
HCT VFR BLD AUTO: 31.9 % (ref 37–48.5)
HGB BLD-MCNC: 10.4 G/DL (ref 12–16)
IMM GRANULOCYTES # BLD AUTO: 0.04 K/UL (ref 0–0.04)
IMM GRANULOCYTES NFR BLD AUTO: 0.5 % (ref 0–0.5)
LYMPHOCYTES # BLD AUTO: 0.8 K/UL (ref 1–4.8)
LYMPHOCYTES NFR BLD: 9.9 % (ref 18–48)
MCH RBC QN AUTO: 29.3 PG (ref 27–31)
MCHC RBC AUTO-ENTMCNC: 32.6 G/DL (ref 32–36)
MCV RBC AUTO: 90 FL (ref 82–98)
MONOCYTES # BLD AUTO: 0.9 K/UL (ref 0.3–1)
MONOCYTES NFR BLD: 10 % (ref 4–15)
NEUTROPHILS # BLD AUTO: 6.6 K/UL (ref 1.8–7.7)
NEUTROPHILS NFR BLD: 77.5 % (ref 38–73)
NRBC BLD-RTO: 0 /100 WBC
OHS QRS DURATION: 120 MS
OHS QTC CALCULATION: 512 MS
PLATELET # BLD AUTO: 208 K/UL (ref 150–450)
PMV BLD AUTO: 11 FL (ref 9.2–12.9)
POTASSIUM SERPL-SCNC: 3.7 MMOL/L (ref 3.5–5.1)
PROT SERPL-MCNC: 6.5 G/DL (ref 6–8.4)
RBC # BLD AUTO: 3.55 M/UL (ref 4–5.4)
SARS-COV-2 RDRP RESP QL NAA+PROBE: NEGATIVE
SODIUM SERPL-SCNC: 142 MMOL/L (ref 136–145)
WBC # BLD AUTO: 8.47 K/UL (ref 3.9–12.7)

## 2024-09-19 PROCEDURE — 85025 COMPLETE CBC W/AUTO DIFF WBC: CPT

## 2024-09-19 PROCEDURE — 94761 N-INVAS EAR/PLS OXIMETRY MLT: CPT

## 2024-09-19 PROCEDURE — 80053 COMPREHEN METABOLIC PANEL: CPT

## 2024-09-19 PROCEDURE — 86580 TB INTRADERMAL TEST: CPT | Performed by: STUDENT IN AN ORGANIZED HEALTH CARE EDUCATION/TRAINING PROGRAM

## 2024-09-19 PROCEDURE — 25000003 PHARM REV CODE 250: Performed by: NURSE PRACTITIONER

## 2024-09-19 PROCEDURE — 25000003 PHARM REV CODE 250: Performed by: STUDENT IN AN ORGANIZED HEALTH CARE EDUCATION/TRAINING PROGRAM

## 2024-09-19 PROCEDURE — 63600175 PHARM REV CODE 636 W HCPCS: Performed by: REGISTERED NURSE

## 2024-09-19 PROCEDURE — 97116 GAIT TRAINING THERAPY: CPT

## 2024-09-19 PROCEDURE — 30200315 PPD INTRADERMAL TEST REV CODE 302: Performed by: STUDENT IN AN ORGANIZED HEALTH CARE EDUCATION/TRAINING PROGRAM

## 2024-09-19 PROCEDURE — U0002 COVID-19 LAB TEST NON-CDC: HCPCS | Performed by: STUDENT IN AN ORGANIZED HEALTH CARE EDUCATION/TRAINING PROGRAM

## 2024-09-19 PROCEDURE — 27000221 HC OXYGEN, UP TO 24 HOURS

## 2024-09-19 PROCEDURE — 36415 COLL VENOUS BLD VENIPUNCTURE: CPT

## 2024-09-19 PROCEDURE — 25000003 PHARM REV CODE 250: Performed by: HOSPITALIST

## 2024-09-19 PROCEDURE — 97110 THERAPEUTIC EXERCISES: CPT

## 2024-09-19 PROCEDURE — 25000003 PHARM REV CODE 250

## 2024-09-19 RX ORDER — AMOXICILLIN AND CLAVULANATE POTASSIUM 875; 125 MG/1; MG/1
1 TABLET, FILM COATED ORAL EVERY 12 HOURS
Start: 2024-09-19

## 2024-09-19 RX ORDER — ONDANSETRON HYDROCHLORIDE 2 MG/ML
4 INJECTION, SOLUTION INTRAVENOUS EVERY 6 HOURS PRN
Status: DISCONTINUED | OUTPATIENT
Start: 2024-09-19 | End: 2024-09-19 | Stop reason: HOSPADM

## 2024-09-19 RX ORDER — OXYCODONE HYDROCHLORIDE 5 MG/1
5 TABLET ORAL EVERY 12 HOURS PRN
Qty: 10 TABLET | Refills: 0 | Status: SHIPPED | OUTPATIENT
Start: 2024-09-19 | End: 2024-09-19

## 2024-09-19 RX ORDER — AMOXICILLIN AND CLAVULANATE POTASSIUM 875; 125 MG/1; MG/1
1 TABLET, FILM COATED ORAL EVERY 12 HOURS
Status: DISCONTINUED | OUTPATIENT
Start: 2024-09-19 | End: 2024-09-19 | Stop reason: HOSPADM

## 2024-09-19 RX ORDER — AMLODIPINE BESYLATE 5 MG/1
5 TABLET ORAL DAILY
Status: DISCONTINUED | OUTPATIENT
Start: 2024-09-19 | End: 2024-09-19 | Stop reason: HOSPADM

## 2024-09-19 RX ORDER — OXYCODONE HYDROCHLORIDE 5 MG/1
5 TABLET ORAL EVERY 12 HOURS PRN
Qty: 10 TABLET | Refills: 0 | Status: SHIPPED | OUTPATIENT
Start: 2024-09-19 | End: 2024-09-24

## 2024-09-19 RX ADMIN — AMLODIPINE BESYLATE 5 MG: 5 TABLET ORAL at 10:09

## 2024-09-19 RX ADMIN — CHOLECALCIFEROL (VITAMIN D3) 10 MCG (400 UNIT) TABLET 2000 UNITS: at 08:09

## 2024-09-19 RX ADMIN — AMOXICILLIN AND CLAVULANATE POTASSIUM 500 MG: 500; 125 TABLET, FILM COATED ORAL at 08:09

## 2024-09-19 RX ADMIN — TUBERCULIN PURIFIED PROTEIN DERIVATIVE 5 UNITS: 5 INJECTION INTRADERMAL at 10:09

## 2024-09-19 RX ADMIN — ONDANSETRON 4 MG: 2 INJECTION INTRAMUSCULAR; INTRAVENOUS at 03:09

## 2024-09-19 RX ADMIN — METOPROLOL TARTRATE 50 MG: 50 TABLET, FILM COATED ORAL at 08:09

## 2024-09-19 RX ADMIN — ATORVASTATIN CALCIUM 80 MG: 40 TABLET, FILM COATED ORAL at 08:09

## 2024-09-19 RX ADMIN — POLYETHYLENE GLYCOL 3350 17 G: 17 POWDER, FOR SOLUTION ORAL at 08:09

## 2024-09-19 NOTE — PLAN OF CARE
POC reviewed and updated with patient/family, treatments ongoing, verbal understanding received. Reinforcements needed.

## 2024-09-19 NOTE — PLAN OF CARE
0846: Discharge orders noted to SNF pending attending assessment. AVS prepared with clinical reference list and 24/7 [facility] Nurse On Call number attached. Will finalize AVS once medication reconciliation complete by attending.     0942: Discharge order reconciliation complete by attending. AVS finalized with medication details. Bedside nurse to print AVS and place in discharge packet for accepting facility.

## 2024-09-19 NOTE — PT/OT/SLP PROGRESS
"Physical Therapy Treatment    Patient Name:  Trudy Horvath   MRN:  [id number]    Recommendations:     Discharge Recommendations: Moderate Intensity Therapy  Discharge Equipment Recommendations: to be determined by next level of care      Assessment:     Trudy Horvath is a 83 y.o. female admitted with a medical diagnosis of CHB (complete heart block).  She presents with the following impairments/functional limitations: weakness, impaired endurance, impaired self care skills, impaired functional mobility, gait instability, impaired balance, impaired cognition, decreased coordination, decreased lower extremity function, decreased upper extremity function, decreased safety awareness, pain, impaired skin, impaired cardiopulmonary response to activity.    Rehab Prognosis: Good; patient would benefit from acute skilled PT services to address these deficits and reach maximum level of function.    Recent Surgery: Procedure(s) (LRB):  INSERTION, CARDIAC PACEMAKER, DUAL CHAMBER (N/A) 2 Days Post-Op    Plan:     During this hospitalization, patient to be seen 5 x/week to address the identified rehab impairments via gait training, therapeutic activities, therapeutic exercises, neuromuscular re-education and progress toward the following goals:    Plan of Care Expires:  10/18/24    Subjective     Chief Complaint: "I'm so thirsty"  Patient/Family Comments/goals: Pt asking for Coke, agreeable to PT treatment.  Pain/Comfort:  Pain Rating 1: 0/10      Objective:     Communicated with nursePiper prior to session.  Patient found HOB elevated with bed alarm, oxygen, telemetry upon PT entry to room.     General Precautions: Standard, fall  Orthopedic Precautions: LUE non weight bearing  Braces: Sling and swathe  Respiratory Status: Nasal cannula, flow 1 L/min     Functional Mobility:  Bed Mobility:     Supine to Sit: stand by assistance  Transfers:     Sit to Stand:  minimum assistance with hand-held assist  Gait: 75' w/HHA and use of gait " belt      AM-PAC 6 CLICK MOBILITY  Turning over in bed (including adjusting bedclothes, sheets and blankets)?: 4  Sitting down on and standing up from a chair with arms (e.g., wheelchair, bedside commode, etc.): 3  Moving from lying on back to sitting on the side of the bed?: 4  Moving to and from a bed to a chair (including a wheelchair)?: 3  Need to walk in hospital room?: 3  Climbing 3-5 steps with a railing?: 3  Basic Mobility Total Score: 20       Treatment & Education:  OOB, ambulated around NS then to B/S chair .  Pt instructed on/performed 1x10 reps BLE exs seated including Aps, LAQ, and marching.  Pt fatigued and falling asleep so exercised terminated.    Patient left  reclined in chair  with all lines intact, call button in reach, grandaughter notified, and all needs in reach .    GOALS:   Multidisciplinary Problems       Physical Therapy Goals          Problem: Physical Therapy    Goal Priority Disciplines Outcome Goal Variances Interventions   Physical Therapy Goal     PT, PT/OT Progressing     Description: Goals to be met by: 10/3/24     Patient will increase functional independence with mobility by performin.  Ind HEP  2.  Ind bed mobility  3.  Ind bed to chair transfer  4.  Ambulate 150' with Mod Ind with or without AD  5.  Up in chair 2 hours                         Time Tracking:     PT Received On: 24  PT Start Time: 935     PT Stop Time: 1000  PT Total Time (min): 25 min     Billable Minutes: Gait Training 10 and Therapeutic Exercise 15    Treatment Type: Treatment  PT/PTA: PT     Number of PTA visits since last PT visit: 0     2024

## 2024-09-19 NOTE — HOSPITAL COURSE
"83-year-old female with PMH of hyperlipidemia, hypertension, dementia, stage III CKD presented with generalized weakness, nausea/vomiting found to be in complete heart block for which a transvenous pacemaker was placed.  She was intubated for respiratory failure, extubated to nasal cannula.  Patient remained bradycardic despite holding beta-blockers.  Noted to have an episode of fever which delayed ppm placement for a day.  Fever improved with no interventions.  Therefore permanent pacemaker placed 9/17 with no immediate complications.  Cardiology cleared patient for discharge on Augmentin b.i.d. for 7 days.  She worked with PT/OT who recommended moderate intensity therapy.  Patient agreeable for discharge to skilled nursing facility.    /78 (Patient Position: Lying)   Pulse 82   Temp 99.2 °F (37.3 °C) (Oral)   Resp 18   Ht 5' 1" (1.549 m)   Wt 49 kg (108 lb)   SpO2 99%   Breastfeeding No   BMI 20.41 kg/m²     Physical Exam  Vitals and nursing note reviewed.   HENT:      Mouth/Throat:      Mouth: Mucous membranes are moist.   Cardiovascular:      Comments: Left arm in sling  Pulmonary:      Effort: Pulmonary effort is normal. No respiratory distress. NC in place  Abdominal:      General: There is no distension.      Palpations: Abdomen is soft.      Tenderness: There is no abdominal tenderness.   Musculoskeletal:      Right lower leg: No edema.      Left lower leg: No edema.   Neurological:      Mental Status: She is alert and oriented to person, place, and time.   "

## 2024-09-19 NOTE — DISCHARGE SUMMARY
[facility] Medicine  Discharge Summary      Patient Name: Trudy Horvath  MRN: [id number]  ZAINAB: [id number]  Patient Class: IP- Inpatient  Admission Date: 9/13/2024  Hospital Length of Stay: 6 days  Discharge Date and Time:  09/19/2024 1:01 PM  Attending Physician: Jessica Limon MD   Discharging Provider: Jessica Limon MD  Primary Care Provider: Maria C Guzman A.M., MD    Primary Care Team: Networked reference to record PCT     HPI:   83 y.o. female with PMHx significant for Hyperlipidemia, Hypertension, dementia, CKD stage 3 was admitted for complete heart block s/p transvenous pacemaker, intubated on mechanical ventilation. Per daughter at bedside patient was in her usual state of health except for recently diagnosed with dementia. She went to eat with family at restaurant the day prior admission. She woke up with multiple episodes of vomiting diarrhea. Then suddenly she became very weak and was not able to walk. EMS was called, she was found to be in high degree heart block, transcutaneous pacemaker was placed and was transferred to the ER. In the ER she was found to be hypotensive with systolic in the 80s. She was brought to OR and have transvenous pacemaker placement, then transfer to ICU.    Procedure(s) (LRB):  INSERTION, CARDIAC PACEMAKER, DUAL CHAMBER (N/A)      Hospital Course:   83-year-old female with PMH of hyperlipidemia, hypertension, dementia, stage III CKD presented with generalized weakness, nausea/vomiting found to be in complete heart block for which a transvenous pacemaker was placed.  She was intubated for respiratory failure, extubated to nasal cannula.  Patient remained bradycardic despite holding beta-blockers.  Noted to have an episode of fever which delayed ppm placement for a day.  Fever improved with no interventions.  Therefore permanent pacemaker placed 9/17 with no immediate complications.  Cardiology cleared patient for discharge on Augmentin b.i.d. for 7 days.  She  "worked with PT/OT who recommended moderate intensity therapy.  Patient agreeable for discharge to skilled nursing facility.    /78 (Patient Position: Lying)   Pulse 82   Temp 99.2 °F (37.3 °C) (Oral)   Resp 18   Ht 5' 1" (1.549 m)   Wt 49 kg (108 lb)   SpO2 99%   Breastfeeding No   BMI 20.41 kg/m²     Physical Exam  Vitals and nursing note reviewed.   HENT:      Mouth/Throat:      Mouth: Mucous membranes are moist.   Cardiovascular:      Comments: Left arm in sling  Pulmonary:      Effort: Pulmonary effort is normal. No respiratory distress. NC in place  Abdominal:      General: There is no distension.      Palpations: Abdomen is soft.      Tenderness: There is no abdominal tenderness.   Musculoskeletal:      Right lower leg: No edema.      Left lower leg: No edema.   Neurological:      Mental Status: She is alert and oriented to person, place, and time.      Goals of Care Treatment Preferences:       Health care agent: Lisa Milligan Health care agent number: [phone]          What is most important right now is to focus on spending time at home, remaining as independent as possible.  Accordingly, we have decided that the best plan to meet the patient's goals includes continuing with treatment.      SDOH Screening:  The patient was screened for food insecurity, housing instability, transportation needs, utility difficulties, and interpersonal safety. The social determinant(s) of health identified as a concern this admission are:  Interpersonal Safety    The plan to address these concerns is: DC to SNF for rehab    Social Determinants of Health with Concerns     Food Insecurity: Patient Unable To Answer (9/16/2024)   Housing Stability: Patient Unable To Answer (9/16/2024)   Transportation Needs: Patient Unable To Answer (9/16/2024)   Utilities: Patient Unable To Answer (9/16/2024)        Consults:   Consults (From admission, onward)          Status Ordering Provider     Inpatient consult to " Infectious Diseases  Once        Provider:  (Not yet assigned)    Completed DMITRI FLETCHER     Inpatient consult to [facility]  Once        Provider:  Coy Ziegler MD Acknowledged BARRIENTOS PAZ, JOSE     Inpatient consult to Cardiology-[facility]  Once        Provider:  (Not yet assigned)    Completed KENDAL CHAVEZ JR              Final Active Diagnoses:    Diagnosis Date Noted POA    PRINCIPAL PROBLEM:  CHB (complete heart block) [I44.2] 09/13/2024 Yes    Temporary transvenous cardiac pacemaker present [Z95.0] 09/13/2024 Yes    Acute respiratory failure with hypoxia and hypercarbia [J96.01, J96.02] 09/13/2024 Yes    Diastolic dysfunction [I51.89] 02/06/2015 Yes    Hypertensive kidney disease with stage 3b chronic kidney disease [I12.9, N18.32] 09/17/2012 Yes      Problems Resolved During this Admission:    Diagnosis Date Noted Date Resolved POA    ELKE (acute kidney injury) [N17.9] 09/14/2024 09/15/2024 Yes    Chest pain [R07.9] 09/13/2024 09/15/2024 Yes    Endotracheally intubated [Z97.8] 09/13/2024 09/15/2024 Yes       Discharged Condition: stable    Disposition: Skilled Nursing Facility    Follow Up:    Patient Instructions:      X-Ray Chest 1 View   Standing Status: Future Standing Exp. Date: 09/17/25     Order Specific Question Answer Comments   May the Radiologist modify the order per protocol to meet the clinical needs of the patient? Yes    Release to patient Immediate      Diet Adult Regular       Significant Diagnostic Studies: Labs: BMP:   Recent Labs   Lab 09/18/24  0555 09/19/24  0357    104    142   K 4.4 3.7    110   CO2 19* 23   BUN 33* 27*   CREATININE 1.2 1.0   CALCIUM 9.2 8.8   MG 2.2  --     and CBC   Recent Labs   Lab 09/18/24  0555 09/19/24  0357   WBC 9.93 8.47   HGB 11.2* 10.4*   HCT 34.7* 31.9*    208       Pending Diagnostic Studies:       None           Medications:  Reconciled Home Medications:      Medication List        START  taking these medications      amoxicillin-clavulanate 875-125mg 875-125 mg per tablet  Commonly known as: AUGMENTIN  Take 1 tablet by mouth every 12 (twelve) hours. End date 9/24/24     metoprolol tartrate 50 MG tablet  Commonly known as: LOPRESSOR  Take 1 tablet (50 mg total) by mouth 2 (two) times daily.     oxyCODONE 5 MG immediate release tablet  Commonly known as: ROXICODONE  Take 1 tablet (5 mg total) by mouth every 12 (twelve) hours as needed (severe pain).            CHANGE how you take these medications      amLODIPine 10 MG tablet  Commonly known as: NORVASC  Take 0.5 tablets (5 mg total) by mouth once daily.  What changed: how much to take            CONTINUE taking these medications      aspirin 81 MG Chew  Take 1 tablet (81 mg total) by mouth once daily.     cholecalciferol (vitamin D3) 50 mcg (2,000 unit) Cap capsule  Commonly known as: VITAMIN D3  Take 1 capsule by mouth once daily.     EYE HEALTH ORAL  Take 1 tablet by mouth once daily.     rosuvastatin 20 MG tablet  Commonly known as: CRESTOR  Take 1 tablet (20 mg total) by mouth once daily.            STOP taking these medications      denosumab 60 mg/mL Syrg  Commonly known as: PROLIA     irbesartan-hydrochlorothiazide 300-12.5 mg per tablet  Commonly known as: AVALIDE              Indwelling Lines/Drains at time of discharge:   Lines/Drains/Airways       Drain  Duration             Female External Urinary Catheter w/ Suction 09/16/24 1445 2 days                    Time spent on the discharge of patient: 36 minutes         Jessica Limon MD  Department of Hospital Medicine  [facility]

## 2024-09-19 NOTE — PLAN OF CARE
Problem: Adult Inpatient Plan of Care  Goal: Plan of Care Review  Outcome: Progressing  Goal: Patient-Specific Goal (Individualized)  Outcome: Progressing  Goal: Absence of Hospital-Acquired Illness or Injury  Outcome: Progressing  Goal: Optimal Comfort and Wellbeing  Outcome: Progressing  Goal: Readiness for Transition of Care  Outcome: Progressing     Problem: Wound  Goal: Optimal Coping  Outcome: Progressing  Goal: Optimal Functional Ability  Outcome: Progressing  Goal: Absence of Infection Signs and Symptoms  Outcome: Progressing  Goal: Improved Oral Intake  Outcome: Progressing  Goal: Optimal Pain Control and Function  Outcome: Progressing  Goal: Skin Health and Integrity  Outcome: Progressing  Goal: Optimal Wound Healing  Outcome: Progressing     Problem: Skin Injury Risk Increased  Goal: Skin Health and Integrity  Outcome: Progressing     Problem: Fall Injury Risk  Goal: Absence of Fall and Fall-Related Injury  Outcome: Progressing     Problem: Infection  Goal: Absence of Infection Signs and Symptoms  Outcome: Progressing     Problem: Acute Kidney Injury/Impairment  Goal: Fluid and Electrolyte Balance  Outcome: Progressing  Goal: Improved Oral Intake  Outcome: Progressing  Goal: Effective Renal Function  Outcome: Progressing  Problem: Cardiac Rhythm Management Device  Goal: Optimal Adjustment to Device  Outcome: Progressing  Goal: Absence of Bleeding  Outcome: Progressing  Goal: Effective Device Function  Outcome: Progressing  Goal: Absence of Infection Signs and Symptoms  Outcome: Progressing  Goal: Acceptable Pain Level  Outcome: Progressing  Goal: Effective Oxygenation and Ventilation  Outcome: Progressing

## 2024-09-19 NOTE — NURSING
Patient discharge to [facility] services. Report called to Ron AYALA via phone, verbal understanding received. A copy of all discharge instruction, education, and Rxs sent with patient. Daughter at bedside at time of discharge. PIVs removed with cath tip intact. Patient discharge with left arm sling in place.

## 2024-09-19 NOTE — PLAN OF CARE
SW sent facility transfer orders via careport to [facility]. Pts daughter is currently at SNF facility signing paperwork at this time. SW awaiting report and room number.     SW received clearance from Xochitl for pt to dc to SNF facility today.     ИРИНА contacted pts daughter Milligan,Lisa (Daughter)  [phone]  and made her aware of dc today. Pts daughter agreeable for pt to dc to [facility] for SNF today. Pts daughter aware of transport time. Pts daughter verbally completed pts choice form.     Report:[phone] Nurse Ba  Room:422A  Transport packet:At nursing station  Transportation: SW step wheelchair transport for 2:00pm. ETA pending.   Facility: [facility]   Address: [address] [place] [address] , [place] [address]     SW will continue to follow pt throughout her transitions of care and assist with any dc needs.     YES response from [facility] re: Referral [id number] for patient in [facility] [id number] A: Yes, willing to accept patient Crystal, The nurse can call report to Abbe at [phone]. Pt will admit to [facility] 422A. Thank you!.     Cleared from  . Bedside Nurse and VN notified.    Future Appointments   Date Time Provider Department Center   9/27/2024 11:40 AM Santiago Davis MD [facility]   1/8/2025  2:20 PM Maria C Guzman A.M., MD [facility]        09/19/24 0997   Final Note   Assessment Type Final Discharge Note   Anticipated Discharge Disposition SNF   Hospital Resources/Appts/Education Provided Appointments scheduled and added to AVS   Post-Acute Status   Discharge Delays None known at this time

## 2024-09-19 NOTE — PLAN OF CARE
Problem: Physical Therapy  Goal: Physical Therapy Goal  Description: Goals to be met by: 10/3/24     Patient will increase functional independence with mobility by performin.  Ind HEP  2.  Ind bed mobility  3.  Ind bed to chair transfer  4.  Ambulate 150' with Mod Ind with or without AD  5.  Up in chair 2 hours    Outcome: Progressing   Pt increased ambulation distance today and left in bedside chair.

## 2024-09-21 LAB
BACTERIA BLD CULT: NORMAL
BACTERIA BLD CULT: NORMAL

## 2024-09-23 LAB
OHS QRS DURATION: 156 MS
OHS QTC CALCULATION: 516 MS

## 2024-09-26 ENCOUNTER — PATIENT OUTREACH (OUTPATIENT)
Dept: NEUROLOGY | Facility: CLINIC | Age: 84
End: 2024-09-26
Payer: MEDICARE

## 2024-09-26 NOTE — PROGRESS NOTES
Dementia Care Management  Pre-Enrollment Call - Follow Up      Date of Service: 2024    Care Navigator completing this form: Jackie Ramos  Referring Provider: Former Care Ecosystem Participant  PCP: Maria C Guzman A.M., MD    Patient: Trudy Horvath  MRN: [id number]  : 1940  Age: 83 y.o.  Gender: female  Race: White  Ethnicity: Not  or /a    Objective: Follow Up after Pre-Enrollment Call.     Trudy Horvath is a 83 y.o. female who presents for Dementia Care Management Pre-Enrollment Call. Unable to reach daughter Henrietta.       Visit Notes:  Patient was scheduled for a visit with Dr. Neal for the GUIDE Model application on  which had to be canceled due to patient having a sever heart block. CTN spoke to daughter on  who indicated that the patient was hospitalized and likely to be discharged to a SNF. CTN has followed the case and patient is currently at a SNF. CTN made an attempt to reach caregiver today, no answer, unable to leave voicemail. CTN sent email offering support and encouraging caregiver to reach out when the time was right if interested in pursuing their enrollment in the GUIDE Model. Dyad will be kept in our records as lost to follow up and the team remains available to provide support, as needed.

## 2024-09-27 ENCOUNTER — OFFICE VISIT (OUTPATIENT)
Dept: CARDIOLOGY | Facility: CLINIC | Age: 84
End: 2024-09-27
Payer: MEDICARE

## 2024-09-27 ENCOUNTER — HOSPITAL ENCOUNTER (OUTPATIENT)
Dept: RADIOLOGY | Facility: HOSPITAL | Age: 84
Discharge: HOME | End: 2024-09-27
Attending: INTERNAL MEDICINE
Payer: MEDICARE

## 2024-09-27 VITALS
BODY MASS INDEX: 20.87 KG/M2 | HEIGHT: 61 IN | SYSTOLIC BLOOD PRESSURE: 159 MMHG | OXYGEN SATURATION: 95 % | HEART RATE: 83 BPM | DIASTOLIC BLOOD PRESSURE: 68 MMHG | WEIGHT: 110.56 LBS

## 2024-09-27 DIAGNOSIS — E78.5 HYPERLIPIDEMIA, UNSPECIFIED HYPERLIPIDEMIA TYPE: ICD-10-CM

## 2024-09-27 DIAGNOSIS — I10 HYPERTENSION, UNSPECIFIED TYPE: ICD-10-CM

## 2024-09-27 DIAGNOSIS — I51.7 LVH (LEFT VENTRICULAR HYPERTROPHY): Primary | ICD-10-CM

## 2024-09-27 DIAGNOSIS — Z95.0 CARDIAC PACEMAKER IN SITU: ICD-10-CM

## 2024-09-27 DIAGNOSIS — I45.9 HEART BLOCK: ICD-10-CM

## 2024-09-27 PROCEDURE — 71045 X-RAY EXAM CHEST 1 VIEW: CPT | Mod: TC,FY

## 2024-09-27 PROCEDURE — 99213 OFFICE O/P EST LOW 20 MIN: CPT | Mod: PBBFAC,25,PN | Performed by: INTERNAL MEDICINE

## 2024-09-27 PROCEDURE — 71045 X-RAY EXAM CHEST 1 VIEW: CPT | Mod: 26,,, | Performed by: RADIOLOGY

## 2024-09-27 PROCEDURE — 99999 PR PBB SHADOW E&M-EST. PATIENT-LVL III: CPT | Mod: PBBFAC,,, | Performed by: INTERNAL MEDICINE

## 2024-09-27 RX ORDER — ONDANSETRON 4 MG/1
4 TABLET, ORALLY DISINTEGRATING ORAL
Qty: 20 TABLET | Refills: 0 | Status: SHIPPED | OUTPATIENT
Start: 2024-09-27

## 2024-09-27 RX ORDER — IRBESARTAN AND HYDROCHLOROTHIAZIDE 300; 12.5 MG/1; MG/1
1 TABLET, FILM COATED ORAL DAILY
Qty: 90 TABLET | Refills: 3 | Status: SHIPPED | OUTPATIENT
Start: 2024-09-27 | End: 2025-09-27

## 2024-09-27 NOTE — PROGRESS NOTES
Subjective:   @Patient ID:  Trudy Horvath is a 83 y.o. female who presents for follow-up of No chief complaint on file.      HPI:       83 year old female admitted to the hospital with syncope. She has a past medical history of  Hyperlipidemia, Hypertension, dementia, CKD stage 3 was admitted for complete heart block s/p transvenous pacemaker and UTI earlier this month.  Patient had permanent pacemaker placement that is a dual-chamber [employer] device.  Now in the clinic 10 days after permanent pacemaker placement.      Doing overall well.  Has more energy now.  Does have on and off bilateral swelling of the feet.  Previously was on irbesartan/HCTZ that was stopped in the hospital and was started on metoprolol 50 mg b.i.d. for moderate LVH with intracavitary gradient.  Pacemaker site without any complications.  Steri-Strips removed and dressing changed today.  No fever or chills.  UTI symptoms resolved.  Completed course of Augmentin.    Results for orders placed during the hospital encounter of 09/13/24    Echo    Interpretation Summary    Left Ventricle: Mild septal thickening. There is concentric remodeling. There is hyperdynamic systolic function with a visually estimated ejection fraction of greater than 70%. Biplane (2D) method of discs ejection fraction is 77%. Moderate LVOT obstruction at rest. Peak gradient around 60 mmHg    Right Ventricle: Normal right ventricular cavity size. Wall thickness is normal. Systolic function is normal.    Left Atrium: Left atrium is dilated.    Mitral Valve: There is moderate posterior mitral annular calcification present. There is mild to moderate regurgitation with a posterolateral eccentriccally directed jet.    Tricuspid Valve: There is mild regurgitation.    IVC/SVC: Patient is ventilated, cannot use IVC diameter to estimate right atrial pressure.      No results found for this or any previous visit.      No results found for this or any previous visit.      Please document  below the medical necessity for continuous telemetry monitoring or discontinue the current order if appropriate.    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 02/06/2024    Resting tremor 02/06/2024    Exudative age-related macular degeneration of both eyes with active choroidal neovascularization 08/20/2019    Hypercalcemia 02/05/2019    Left ventricular hypertrophy due to hypertensive disease 02/06/2015    Diastolic dysfunction 02/06/2015    Hypokalemia 04/07/2014    Cyst of breast 05/20/2013    Osteoporosis, post-menopausal     Hypertensive kidney disease with stage 3b chronic kidney disease 09/17/2012    Hyperlipidemia 09/17/2012                    LAST HbA1c  Lab Results   Component Value Date    HGBA1C 5.7 (H) 08/26/2024       Lipid panel  Lab Results   Component Value Date    CHOL 308 (H) 08/26/2024    CHOL 224 (H) 02/06/2024    CHOL 193 04/25/2023     Lab Results   Component Value Date    HDL 69 08/26/2024    HDL 60 02/06/2024    HDL 66 04/25/2023     Lab Results   Component Value Date    LDLCALC 217.2 (H) 08/26/2024    LDLCALC 141.6 02/06/2024    LDLCALC 106.8 04/25/2023     Lab Results   Component Value Date    TRIG 109 08/26/2024    TRIG 112 02/06/2024    TRIG 101 04/25/2023     Lab Results   Component Value Date    CHOLHDL 22.4 08/26/2024    CHOLHDL 26.8 02/06/2024    CHOLHDL 34.2 04/25/2023            Review of Systems   Constitutional: Negative for chills and fever.   HENT:  Negative for hearing loss and nosebleeds.    Eyes:  Negative for blurred vision.   Cardiovascular:  Negative for chest pain, leg swelling and palpitations.   Respiratory:  Positive for  shortness of breath. Negative for hemoptysis.    Hematologic/Lymphatic: Negative for bleeding problem.   Skin:  Negative for itching.   Musculoskeletal:  Negative for falls.   Gastrointestinal:  Negative for abdominal pain and hematochezia.   Genitourinary:  Negative for hematuria.   Neurological:  Negative for dizziness and loss of balance.   Psychiatric/Behavioral:  Negative for altered mental status and depression.        Objective:   Physical Exam  Constitutional:       Appearance: She is well-developed.   HENT:      Head: Normocephalic and atraumatic.   Eyes:      Conjunctiva/sclera: Conjunctivae normal.   Neck:      Vascular: No carotid bruit or JVD.   Cardiovascular:      Rate and Rhythm: Normal rate and regular rhythm.      Pulses:           Carotid pulses are 2+ on the right side and 2+ on the left side.       Radial pulses are 2+ on the right side and 2+ on the left side.      Heart sounds: Murmur heard.      No friction rub. No gallop.   Pulmonary:      Effort: Pulmonary effort is normal. No respiratory distress.      Breath sounds: Normal breath sounds. No stridor. No wheezing.   Musculoskeletal:      Cervical back: Neck supple.   Skin:     General: Skin is warm and dry.   Neurological:      Mental Status: She is alert and oriented to person, place, and time.   Psychiatric:         Behavior: Behavior normal.         Assessment:     1. LVH (left ventricular hypertrophy)    2. Cardiac pacemaker in situ        Plan:     -now present in the clinic for wound check.  Wound healing nicely.  Steri-Strips removed.  New dressing in place.  I gave the patient some extra dressing to be changed in 1 week.  - has noticed on and off swelling of the feet.  Likely this is because of hypotension in the presence of significant LVOT gradient of 60 mm Hg.  I recommend restarting her irbesartan/HCTZ.  Continuing metoprolol 50 mg twice a day.  I am stopping the patient's amlodipine dose.  - scheduled for outpatient device  clinic with [employer]  - patient is to get remote monitoring device for [employer]  - follow up in 3 months with a repeat echocardiogram to assess for intracavitary gradient  - previously had some nausea likely from taking antibiotics.  - follow up in 3 months.  Zofran as needed sent to the pharmacy.    Pertinent cardiac images and EKG reviewed independently.    Continue with current medical plan and lifestyle changes.  Return sooner for concerns or questions. If symptoms persist go to the ED  I have reviewed all pertinent data including patient's medical history in detail and updated the computerized patient record.     Orders Placed This Encounter   Procedures    Cardiac device check - In Clinic & Hospital     Standing Status:   Future     Standing Expiration Date:   9/27/2025     Order Specific Question:   What is the reason for interrogation?     Answer:   new placement     Order Specific Question:   What is the name of the device ?     Answer:   medtronics dual chamber     Order Specific Question:   Release to patient     Answer:   Immediate    Echo     Standing Status:   Future     Standing Expiration Date:   9/27/2025     Order Specific Question:   Release to patient     Answer:   Immediate       Follow up as scheduled.     She expressed verbal understanding and agreed with the plan    Patient's Medications   New Prescriptions    IRBESARTAN-HYDROCHLOROTHIAZIDE (AVALIDE) 300-12.5 MG PER TABLET    Take 1 tablet by mouth once daily.    ONDANSETRON (ZOFRAN-ODT) 4 MG TBDL    Take 1 tablet (4 mg total) by mouth every 24 hours as needed (nausea).   Previous Medications    AMOXICILLIN-CLAVULANATE 875-125MG (AUGMENTIN) 875-125 MG PER TABLET    Take 1 tablet by mouth every 12 (twelve) hours. End date 9/24/24    ASPIRIN 81 MG CHEW    Take 1 tablet (81 mg total) by mouth once daily.    CHOLECALCIFEROL, VITAMIN D3, (VITAMIN D3) 50 MCG (2,000 UNIT) CAP    Take 1 capsule by mouth once daily.    DOCOSAHEXANOIC  ACID/VIT C/LUT (EYE HEALTH ORAL)    Take 1 tablet by mouth once daily.    METOPROLOL TARTRATE (LOPRESSOR) 50 MG TABLET    Take 1 tablet (50 mg total) by mouth 2 (two) times daily.    ROSUVASTATIN (CRESTOR) 20 MG TABLET    Take 1 tablet (20 mg total) by mouth once daily.   Modified Medications    No medications on file   Discontinued Medications    AMLODIPINE (NORVASC) 10 MG TABLET    Take 0.5 tablets (5 mg total) by mouth once daily.        Santiago Davis M.D

## 2024-09-30 ENCOUNTER — PATIENT MESSAGE (OUTPATIENT)
Dept: CARDIOLOGY | Facility: CLINIC | Age: 84
End: 2024-09-30
Payer: MEDICARE

## 2024-10-03 ENCOUNTER — TELEPHONE (OUTPATIENT)
Dept: CARDIOLOGY | Facility: CLINIC | Age: 84
End: 2024-10-03
Payer: MEDICARE

## 2024-10-03 ENCOUNTER — PATIENT MESSAGE (OUTPATIENT)
Dept: INTERNAL MEDICINE | Facility: CLINIC | Age: 84
End: 2024-10-03
Payer: MEDICARE

## 2024-10-03 NOTE — TELEPHONE ENCOUNTER
----- Message from Anastasiya sent at 10/3/2024 10:58 AM CDT -----  Type:  Needs Medical Advice    Who Called:  [facility]     Would the patient rather a call back or a response via [facility]? Call back   Best Call Back Number: [phone]  ask for Dia Ignacio   Additional Information:  caller has questions to ask about the pt  pace maker dressing They would like to know if /when will it need to be removed they would like to know can they remove it

## 2024-10-17 RX ORDER — ONDANSETRON 4 MG/1
4 TABLET, ORALLY DISINTEGRATING ORAL
Qty: 20 TABLET | Refills: 3 | Status: SHIPPED | OUTPATIENT
Start: 2024-10-17

## 2024-10-23 ENCOUNTER — OFFICE VISIT (OUTPATIENT)
Dept: FAMILY MEDICINE | Facility: CLINIC | Age: 84
End: 2024-10-23
Payer: MEDICARE

## 2024-10-23 ENCOUNTER — LAB VISIT (OUTPATIENT)
Dept: LAB | Facility: HOSPITAL | Age: 84
End: 2024-10-23
Attending: FAMILY MEDICINE
Payer: MEDICARE

## 2024-10-23 VITALS
BODY MASS INDEX: 20.14 KG/M2 | SYSTOLIC BLOOD PRESSURE: 150 MMHG | HEART RATE: 71 BPM | OXYGEN SATURATION: 96 % | WEIGHT: 106.69 LBS | DIASTOLIC BLOOD PRESSURE: 68 MMHG | HEIGHT: 61 IN

## 2024-10-23 DIAGNOSIS — G25.2 RESTING TREMOR: ICD-10-CM

## 2024-10-23 DIAGNOSIS — Z23 FLU VACCINE NEED: ICD-10-CM

## 2024-10-23 DIAGNOSIS — N18.32 HYPERTENSIVE KIDNEY DISEASE WITH STAGE 3B CHRONIC KIDNEY DISEASE: ICD-10-CM

## 2024-10-23 DIAGNOSIS — D51.8 OTHER VITAMIN B12 DEFICIENCY ANEMIA: ICD-10-CM

## 2024-10-23 DIAGNOSIS — I70.0 AORTIC ATHEROSCLEROSIS: ICD-10-CM

## 2024-10-23 DIAGNOSIS — Z23 NEED FOR COVID-19 VACCINE: ICD-10-CM

## 2024-10-23 DIAGNOSIS — G30.1 MILD LATE ONSET ALZHEIMER'S DEMENTIA WITH ANXIETY: ICD-10-CM

## 2024-10-23 DIAGNOSIS — R26.89 BALANCE PROBLEM: ICD-10-CM

## 2024-10-23 DIAGNOSIS — I12.9 HYPERTENSIVE KIDNEY DISEASE WITH STAGE 3B CHRONIC KIDNEY DISEASE: ICD-10-CM

## 2024-10-23 DIAGNOSIS — R26.9 ABNORMALITY OF GAIT: ICD-10-CM

## 2024-10-23 DIAGNOSIS — Z12.31 SCREENING MAMMOGRAM FOR BREAST CANCER: ICD-10-CM

## 2024-10-23 DIAGNOSIS — Z95.0 CARDIAC PACEMAKER: ICD-10-CM

## 2024-10-23 DIAGNOSIS — F02.A4 MILD LATE ONSET ALZHEIMER'S DEMENTIA WITH ANXIETY: ICD-10-CM

## 2024-10-23 DIAGNOSIS — E78.49 OTHER HYPERLIPIDEMIA: ICD-10-CM

## 2024-10-23 DIAGNOSIS — I11.9 HYPERTENSIVE LEFT VENTRICULAR HYPERTROPHY, WITHOUT HEART FAILURE: Primary | ICD-10-CM

## 2024-10-23 PROBLEM — J96.01 ACUTE RESPIRATORY FAILURE WITH HYPOXIA AND HYPERCARBIA: Status: RESOLVED | Noted: 2024-09-13 | Resolved: 2024-10-23

## 2024-10-23 PROBLEM — J96.02 ACUTE RESPIRATORY FAILURE WITH HYPOXIA AND HYPERCARBIA: Status: RESOLVED | Noted: 2024-09-13 | Resolved: 2024-10-23

## 2024-10-23 PROBLEM — I44.2 CHB (COMPLETE HEART BLOCK): Status: RESOLVED | Noted: 2024-09-13 | Resolved: 2024-10-23

## 2024-10-23 PROCEDURE — 80061 LIPID PANEL: CPT | Performed by: FAMILY MEDICINE

## 2024-10-23 PROCEDURE — 99215 OFFICE O/P EST HI 40 MIN: CPT | Mod: S$PBB,,, | Performed by: FAMILY MEDICINE

## 2024-10-23 PROCEDURE — 99213 OFFICE O/P EST LOW 20 MIN: CPT | Mod: PBBFAC,PO | Performed by: FAMILY MEDICINE

## 2024-10-23 PROCEDURE — 99999PBSHW PR PBB SHADOW TECHNICAL ONLY FILED TO HB: Mod: PBBFAC,,,

## 2024-10-23 PROCEDURE — G0008 ADMIN INFLUENZA VIRUS VAC: HCPCS | Mod: PBBFAC,PO

## 2024-10-23 PROCEDURE — 91322 SARSCOV2 VAC 50 MCG/0.5ML IM: CPT | Mod: PBBFAC,PO

## 2024-10-23 PROCEDURE — 36415 COLL VENOUS BLD VENIPUNCTURE: CPT | Mod: PO | Performed by: FAMILY MEDICINE

## 2024-10-23 PROCEDURE — 80069 RENAL FUNCTION PANEL: CPT | Performed by: FAMILY MEDICINE

## 2024-10-23 PROCEDURE — 90653 IIV ADJUVANT VACCINE IM: CPT | Mod: PBBFAC,PO

## 2024-10-23 PROCEDURE — 99999 PR PBB SHADOW E&M-EST. PATIENT-LVL III: CPT | Mod: PBBFAC,,, | Performed by: FAMILY MEDICINE

## 2024-10-23 PROCEDURE — 90480 ADMN SARSCOV2 VAC 1/ONLY CMP: CPT | Mod: PBBFAC,PO

## 2024-10-23 PROCEDURE — 85025 COMPLETE CBC W/AUTO DIFF WBC: CPT | Performed by: FAMILY MEDICINE

## 2024-10-23 RX ORDER — IRBESARTAN AND HYDROCHLOROTHIAZIDE 300; 12.5 MG/1; MG/1
1 TABLET, FILM COATED ORAL DAILY
Qty: 90 TABLET | Refills: 3 | Status: SHIPPED | OUTPATIENT
Start: 2024-10-23 | End: 2025-10-23

## 2024-10-23 RX ORDER — HYDROCHLOROTHIAZIDE 12.5 MG/1
12.5 CAPSULE ORAL DAILY
Qty: 90 CAPSULE | Refills: 3 | Status: SHIPPED | OUTPATIENT
Start: 2024-10-23

## 2024-10-23 RX ORDER — AMLODIPINE BESYLATE 5 MG/1
5 TABLET ORAL NIGHTLY
Qty: 90 TABLET | Refills: 3 | Status: SHIPPED | OUTPATIENT
Start: 2024-10-23

## 2024-10-23 RX ORDER — AMLODIPINE BESYLATE 5 MG/1
5 TABLET ORAL
COMMUNITY
Start: 2024-09-19 | End: 2024-10-23 | Stop reason: SDUPTHER

## 2024-10-23 RX ORDER — HYDROCHLOROTHIAZIDE 12.5 MG/1
CAPSULE ORAL
COMMUNITY
Start: 2024-09-26 | End: 2024-10-23 | Stop reason: SDUPTHER

## 2024-10-23 RX ORDER — ONDANSETRON 4 MG/1
4 TABLET, FILM COATED ORAL EVERY 4 HOURS PRN
COMMUNITY
Start: 2024-09-23

## 2024-10-23 RX ORDER — ROSUVASTATIN CALCIUM 20 MG/1
20 TABLET, COATED ORAL DAILY
Qty: 90 TABLET | Refills: 3 | Status: SHIPPED | OUTPATIENT
Start: 2024-10-23

## 2024-10-23 RX ORDER — METOPROLOL TARTRATE 50 MG/1
50 TABLET ORAL 2 TIMES DAILY
Qty: 180 TABLET | Refills: 3 | Status: SHIPPED | OUTPATIENT
Start: 2024-10-23 | End: 2025-10-23

## 2024-10-23 RX ADMIN — INFLUENZA A VIRUS A/VICTORIA/4897/2022 IVR-238 (H1N1) ANTIGEN (FORMALDEHYDE INACTIVATED), INFLUENZA A VIRUS A/THAILAND/8/2022 IVR-237 (H3N2) ANTIGEN (FORMALDEHYDE INACTIVATED), INFLUENZA B VIRUS B/AUSTRIA/1359417/2021 BVR-26 ANTIGEN (FORMALDEHYDE INACTIVATED) 0.5 ML: 15; 15; 15 INJECTION, SUSPENSION INTRAMUSCULAR at 11:10

## 2024-10-23 RX ADMIN — COVID-19 VACCINE, MRNA 0.5 ML: 50 INJECTION, SUSPENSION INTRAMUSCULAR at 12:10

## 2024-10-24 ENCOUNTER — HOSPITAL ENCOUNTER (OUTPATIENT)
Dept: RADIOLOGY | Facility: HOSPITAL | Age: 84
Discharge: HOME | End: 2024-10-24
Attending: FAMILY MEDICINE
Payer: MEDICARE

## 2024-10-24 DIAGNOSIS — Z12.31 SCREENING MAMMOGRAM FOR BREAST CANCER: ICD-10-CM

## 2024-10-24 LAB
ALBUMIN SERPL BCP-MCNC: 3.7 G/DL (ref 3.5–5.2)
ANION GAP SERPL CALC-SCNC: 13 MMOL/L (ref 8–16)
BASOPHILS # BLD AUTO: 0.03 K/UL (ref 0–0.2)
BASOPHILS NFR BLD: 0.4 % (ref 0–1.9)
BUN SERPL-MCNC: 14 MG/DL (ref 8–23)
CALCIUM SERPL-MCNC: 9.6 MG/DL (ref 8.7–10.5)
CHLORIDE SERPL-SCNC: 106 MMOL/L (ref 95–110)
CHOLEST SERPL-MCNC: 194 MG/DL (ref 120–199)
CHOLEST/HDLC SERPL: 2.9 {RATIO} (ref 2–5)
CO2 SERPL-SCNC: 24 MMOL/L (ref 23–29)
CREAT SERPL-MCNC: 1 MG/DL (ref 0.5–1.4)
DIFFERENTIAL METHOD BLD: ABNORMAL
EOSINOPHIL # BLD AUTO: 0.1 K/UL (ref 0–0.5)
EOSINOPHIL NFR BLD: 1.4 % (ref 0–8)
ERYTHROCYTE [DISTWIDTH] IN BLOOD BY AUTOMATED COUNT: 14.2 % (ref 11.5–14.5)
EST. GFR  (NO RACE VARIABLE): 54.9 ML/MIN/1.73 M^2
GLUCOSE SERPL-MCNC: 91 MG/DL (ref 70–110)
HCT VFR BLD AUTO: 36 % (ref 37–48.5)
HDLC SERPL-MCNC: 66 MG/DL (ref 40–75)
HDLC SERPL: 34 % (ref 20–50)
HGB BLD-MCNC: 11.4 G/DL (ref 12–16)
IMM GRANULOCYTES # BLD AUTO: 0.03 K/UL (ref 0–0.04)
IMM GRANULOCYTES NFR BLD AUTO: 0.4 % (ref 0–0.5)
LDLC SERPL CALC-MCNC: 106.6 MG/DL (ref 63–159)
LYMPHOCYTES # BLD AUTO: 1.9 K/UL (ref 1–4.8)
LYMPHOCYTES NFR BLD: 23.5 % (ref 18–48)
MCH RBC QN AUTO: 28.6 PG (ref 27–31)
MCHC RBC AUTO-ENTMCNC: 31.7 G/DL (ref 32–36)
MCV RBC AUTO: 91 FL (ref 82–98)
MONOCYTES # BLD AUTO: 0.7 K/UL (ref 0.3–1)
MONOCYTES NFR BLD: 9.1 % (ref 4–15)
NEUTROPHILS # BLD AUTO: 5.1 K/UL (ref 1.8–7.7)
NEUTROPHILS NFR BLD: 65.2 % (ref 38–73)
NONHDLC SERPL-MCNC: 128 MG/DL
NRBC BLD-RTO: 0 /100 WBC
PHOSPHATE SERPL-MCNC: 3.1 MG/DL (ref 2.7–4.5)
PLATELET # BLD AUTO: 264 K/UL (ref 150–450)
PMV BLD AUTO: 10.5 FL (ref 9.2–12.9)
POTASSIUM SERPL-SCNC: 3.5 MMOL/L (ref 3.5–5.1)
RBC # BLD AUTO: 3.98 M/UL (ref 4–5.4)
SODIUM SERPL-SCNC: 143 MMOL/L (ref 136–145)
TRIGL SERPL-MCNC: 107 MG/DL (ref 30–150)
WBC # BLD AUTO: 7.88 K/UL (ref 3.9–12.7)

## 2024-10-24 PROCEDURE — 77067 SCR MAMMO BI INCL CAD: CPT | Mod: TC

## 2024-10-24 PROCEDURE — 77063 BREAST TOMOSYNTHESIS BI: CPT | Mod: TC

## 2024-11-01 ENCOUNTER — OFFICE VISIT (OUTPATIENT)
Facility: CLINIC | Age: 84
End: 2024-11-01
Payer: MEDICARE

## 2024-11-01 ENCOUNTER — TELEPHONE (OUTPATIENT)
Dept: NEUROLOGY | Facility: CLINIC | Age: 84
End: 2024-11-01

## 2024-11-01 VITALS
HEIGHT: 60 IN | DIASTOLIC BLOOD PRESSURE: 63 MMHG | SYSTOLIC BLOOD PRESSURE: 146 MMHG | HEART RATE: 62 BPM | WEIGHT: 101.19 LBS | BODY MASS INDEX: 19.87 KG/M2

## 2024-11-01 DIAGNOSIS — F02.A4 MILD LATE ONSET ALZHEIMER'S DEMENTIA WITH ANXIETY: Primary | ICD-10-CM

## 2024-11-01 DIAGNOSIS — G25.2 RESTING TREMOR: ICD-10-CM

## 2024-11-01 DIAGNOSIS — E53.8 B12 DEFICIENCY: ICD-10-CM

## 2024-11-01 DIAGNOSIS — G20.C PARKINSONISM, UNSPECIFIED PARKINSONISM TYPE: ICD-10-CM

## 2024-11-01 DIAGNOSIS — G30.1 MILD LATE ONSET ALZHEIMER'S DEMENTIA WITH ANXIETY: Primary | ICD-10-CM

## 2024-11-01 PROCEDURE — 99999 PR PBB SHADOW E&M-EST. PATIENT-LVL III: CPT | Mod: PBBFAC,,, | Performed by: STUDENT IN AN ORGANIZED HEALTH CARE EDUCATION/TRAINING PROGRAM

## 2024-11-01 PROCEDURE — 99213 OFFICE O/P EST LOW 20 MIN: CPT | Mod: PBBFAC,PN | Performed by: STUDENT IN AN ORGANIZED HEALTH CARE EDUCATION/TRAINING PROGRAM

## 2024-11-01 RX ORDER — CYANOCOBALAMIN 1000 UG/ML
INJECTION, SOLUTION INTRAMUSCULAR; SUBCUTANEOUS
Qty: 10 ML | Refills: 2 | Status: SHIPPED | OUTPATIENT
Start: 2024-11-01

## 2024-11-01 RX ORDER — CARBIDOPA AND LEVODOPA 25; 100 MG/1; MG/1
0.5 TABLET ORAL 3 TIMES DAILY
Qty: 45 TABLET | Refills: 11 | Status: SHIPPED | OUTPATIENT
Start: 2024-11-01 | End: 2025-11-01

## 2024-11-01 NOTE — TELEPHONE ENCOUNTER
----- Message from Claudio sent at 11/1/2024 10:03 AM CDT -----  Type:  Patient Returning Call    Who Called:pt  Who Left Message for Patient:  Does the patient know what this is regarding?:appt today  Would the patient rather a call back or a response via [facility]? call  Best Call Back Number: [phone] - pts daughter  Additional Information: pt states they chose to come here because it was the soonest appt and main campus is a lot of walking for the pt. Pt states they will be on their way soon. Thank you

## 2024-11-04 ENCOUNTER — PATIENT MESSAGE (OUTPATIENT)
Facility: CLINIC | Age: 84
End: 2024-11-04
Payer: MEDICARE

## 2024-11-04 DIAGNOSIS — E53.8 B12 DEFICIENCY: ICD-10-CM

## 2024-11-04 DIAGNOSIS — G20.C PARKINSONISM, UNSPECIFIED PARKINSONISM TYPE: ICD-10-CM

## 2024-11-04 RX ORDER — CYANOCOBALAMIN 1000 UG/ML
INJECTION, SOLUTION INTRAMUSCULAR; SUBCUTANEOUS
Qty: 10 ML | Refills: 2 | Status: SHIPPED | OUTPATIENT
Start: 2024-11-04

## 2024-11-04 RX ORDER — CARBIDOPA AND LEVODOPA 25; 100 MG/1; MG/1
0.5 TABLET ORAL 3 TIMES DAILY
Qty: 45 TABLET | Refills: 11 | Status: SHIPPED | OUTPATIENT
Start: 2024-11-04 | End: 2025-11-04

## 2024-11-06 DIAGNOSIS — F02.A4 MILD LATE ONSET ALZHEIMER'S DEMENTIA WITH ANXIETY: Primary | ICD-10-CM

## 2024-11-06 DIAGNOSIS — G30.1 MILD LATE ONSET ALZHEIMER'S DEMENTIA WITH ANXIETY: Primary | ICD-10-CM

## 2024-11-06 RX ORDER — MEMANTINE HYDROCHLORIDE 5 MG/1
5 TABLET ORAL 2 TIMES DAILY
Qty: 60 TABLET | Refills: 11 | Status: SHIPPED | OUTPATIENT
Start: 2024-11-06 | End: 2025-11-06

## 2024-11-12 ENCOUNTER — DOCUMENT SCAN (OUTPATIENT)
Dept: HOME HEALTH SERVICES | Facility: HOSPITAL | Age: 84
End: 2024-11-12
Payer: MEDICARE

## 2024-11-30 ENCOUNTER — PATIENT MESSAGE (OUTPATIENT)
Facility: CLINIC | Age: 84
End: 2024-11-30
Payer: MEDICARE

## 2024-11-30 PROCEDURE — G0179 MD RECERTIFICATION HHA PT: HCPCS | Mod: ,,, | Performed by: FAMILY MEDICINE

## 2024-12-11 ENCOUNTER — DOCUMENT SCAN (OUTPATIENT)
Dept: HOME HEALTH SERVICES | Facility: HOSPITAL | Age: 84
End: 2024-12-11
Payer: MEDICARE

## 2024-12-12 ENCOUNTER — OFFICE VISIT (OUTPATIENT)
Facility: CLINIC | Age: 84
End: 2024-12-12
Payer: MEDICARE

## 2024-12-12 VITALS
WEIGHT: 101 LBS | HEART RATE: 60 BPM | SYSTOLIC BLOOD PRESSURE: 131 MMHG | DIASTOLIC BLOOD PRESSURE: 63 MMHG | HEIGHT: 60 IN | BODY MASS INDEX: 19.83 KG/M2

## 2024-12-12 DIAGNOSIS — G20.C PARKINSONISM, UNSPECIFIED PARKINSONISM TYPE: Primary | ICD-10-CM

## 2024-12-12 DIAGNOSIS — G30.1 MILD LATE ONSET ALZHEIMER'S DEMENTIA WITH ANXIETY: ICD-10-CM

## 2024-12-12 DIAGNOSIS — F02.A4 MILD LATE ONSET ALZHEIMER'S DEMENTIA WITH ANXIETY: ICD-10-CM

## 2024-12-12 PROCEDURE — 99213 OFFICE O/P EST LOW 20 MIN: CPT | Mod: PBBFAC,PN | Performed by: STUDENT IN AN ORGANIZED HEALTH CARE EDUCATION/TRAINING PROGRAM

## 2024-12-12 PROCEDURE — 99999 PR PBB SHADOW E&M-EST. PATIENT-LVL III: CPT | Mod: PBBFAC,,, | Performed by: STUDENT IN AN ORGANIZED HEALTH CARE EDUCATION/TRAINING PROGRAM

## 2024-12-12 RX ORDER — CARVEDILOL 3.12 MG/1
3.12 TABLET ORAL 2 TIMES DAILY
COMMUNITY
Start: 2024-11-21

## 2024-12-12 RX ORDER — CARBIDOPA AND LEVODOPA 25; 100 MG/1; MG/1
1.5 TABLET ORAL 3 TIMES DAILY
Qty: 135 TABLET | Refills: 11 | Status: SHIPPED | OUTPATIENT
Start: 2024-12-12 | End: 2025-12-12

## 2024-12-12 NOTE — PROGRESS NOTES
[facility] NEUROLOGY  [facility], [place]    Date: 12/12/24  Patient Name: Trudy Horvath   MRN: [id number]   PCP: Maria C Guzman A.M.  Referring Provider: No ref. provider found    Assessment:   Trudy Horvath is a 84 y.o. female presenting for Alzheimer's and parkinsonism follow up. Case most consistent with Alzheimer's disease + parkinsonism. She also has B12 deficiency. She did notice improvement and no side effects with sinemet. Will plan to increase to 1.5 tabs TID. Will change scheduling to 7 - 12 - 5 pm. We spoke about the importance of the different therapy modalities but she is not interested in these at this time. Once again, not interested in the Syn-one biopsy at this time. Prefers to go up on sinemet. We will continue B12 shots. We will see how much improvement she gets from sinemet before we consider increasing memantine. We will plan a follow up in 3 months. The patient and her daughter verbalized understanding and agreed with the plan     Plan:     - Not interested in Syn-one biopsy at this time. Prefers to go up on sinemet   - Increase sinemet to 1.5 tab TID. 7 - 12 - 5  - Continue memantine 5 mg BID.   - Not interested BIG or Loud therapy at this time  - Constipation under control   - No dysphagia at this time   - Continue B12 supplementation     Problem List Items Addressed This Visit          Neuro    Parkinsonism - Primary    Relevant Medications    carbidopa-levodopa  mg (SINEMET)  mg per tablet     Quirino Miller MD    Subjective:   Patient seen in consultation at the request of No ref. provider found for the evaluation of Alzheimer's and parkinsonism follow up. A copy of this note will be sent to the referring physician.      Interval history 12/12/24:   Ms. Trudy Horvath is a 84 y.o. female presenting for Alzheimer's and parkinsonism follow up. Since last seen, biomarkers were positive for Alzheimer's-related pathology so I prescribed namenda. She has noticed some  "mild improvement since this, but we also changed her B12 supplementation from pills to shots. We also decided to try a trial of sinemet for her parkinsonism. She noticed improvement of her tremor and her speed. She didn't noticed any significant side effects.     Per previous by myself on 11/1/24    Assessment:   Trudy Horvath is a 84 y.o. female presenting for Alzheimer's and parkinsonism follow up. She was diagnosed by the neurology team back at [facility] in [place]. Diagnosis was given due to a combination of history, imaging findings, neuropsychological testing. She was recently hospitalized and was found to have complete heart block for which she is now sp pacemaker placement. Daughter made this appointment because she feels like her memory is maybe getting worse. I agree with the possible diagnosis, but, I will order serum biomarkers, and change B12 pills to shots. I explained that given complete heart block, most of the "memory" medications are contraindicated in her case. I agree with parkinsonism on exam. No history of neuroleptic use. I do believe she could have PD. She has had 2 falls in the past four months. We decided sinemet trial > Syn one biopsy. I will see her again in 1 month to reassess. The patient and her daughter verbalized understanding and agreed with the plan      Plan:      - B12 shots  - Biomarkers for amyloid pathology   - Will review which medication could be beneficial for memory given no candidate for lecanemab, and heart block sp pacemaker placement   - Sinemet trial   - Follow up in 1 month      PAST MEDICAL HISTORY:  Past Medical History:   Diagnosis Date    Hyperlipidemia     Hypertension     Increased glucose level     risk of diabetes    Macular degeneration (senile) of retina, unspecified     Osteoporosis, post-menopausal        PAST SURGICAL HISTORY:  Past Surgical History:   Procedure Laterality Date    A-V CARDIAC PACEMAKER INSERTION N/A 9/17/2024    Procedure: INSERTION, " CARDIAC PACEMAKER, DUAL CHAMBER;  Surgeon: Santiago Davis MD;  Location: [facility] CATH LAB/EP;  Service: Cardiology;  Laterality: N/A;    BREAST BIOPSY      benign    INSERTION, PACEMAKER, TEMPORARY TRANSVENOUS N/A 9/13/2024    Procedure: Insertion, Pacemaker, Temporary Transvenous;  Surgeon: Bre Melgar MD;  Location: [facility] CATH LAB/EP;  Service: Cardiology;  Laterality: N/A;       CURRENT MEDS:  Current Outpatient Medications   Medication Sig Dispense Refill    amLODIPine (NORVASC) 5 MG tablet Take 1 tablet (5 mg total) by mouth every evening. 90 tablet 3    aspirin 81 MG Chew Take 1 tablet (81 mg total) by mouth once daily. 90 tablet 3    carvediloL (COREG) 3.125 MG tablet Take 3.125 mg by mouth 2 (two) times daily.      cholecalciferol, vitamin D3, (VITAMIN D3) 50 mcg (2,000 unit) Cap Take 1 capsule by mouth once daily.      cyanocobalamin 1,000 mcg/mL injection Inject 1ml weekly for 4 weeks, then 1ml every month for a year 10 mL 2    docosahexanoic acid/vit C/lut (EYE HEALTH ORAL) Take 1 tablet by mouth once daily.      hydroCHLOROthiazide (MICROZIDE) 12.5 mg capsule Take 1 capsule (12.5 mg total) by mouth once daily. 90 capsule 3    irbesartan-hydrochlorothiazide (AVALIDE) 300-12.5 mg per tablet Take 1 tablet by mouth once daily. 90 tablet 3    memantine (NAMENDA) 5 MG Tab Take 1 tablet (5 mg total) by mouth 2 (two) times daily. 60 tablet 11    metoprolol tartrate (LOPRESSOR) 50 MG tablet Take 1 tablet (50 mg total) by mouth 2 (two) times daily. 180 tablet 3    ondansetron (ZOFRAN) 4 MG tablet Take 4 mg by mouth every 4 (four) hours as needed.      ondansetron (ZOFRAN-ODT) 4 MG TbDL Take 1 tablet (4 mg total) by mouth every 24 hours as needed (nausea). 20 tablet 3    rosuvastatin (CRESTOR) 20 MG tablet Take 1 tablet (20 mg total) by mouth once daily. 90 tablet 3    carbidopa-levodopa  mg (SINEMET)  mg per tablet Take 1.5 tablets by mouth 3 (three) times daily. 135 tablet 11     No current  facility-administered medications for this visit.       ALLERGIES:  Review of patient's allergies indicates:  No Known Allergies    FAMILY HISTORY:  Family History   Problem Relation Name Age of Onset    Heart disease Mother      Hypertension Father      Stroke Father      No Known Problems Sister 2     No Known Problems Daughter 1        SOCIAL HISTORY:  Social History     Tobacco Use    Smoking status: Never     Passive exposure: Never    Smokeless tobacco: Never   Substance Use Topics    Alcohol use: No    Drug use: Never       Review of Systems:  12 system review of systems is negative except for the symptoms mentioned in HPI.      Objective:     Vitals:    12/12/24 1025   BP: 131/63   BP Location: Right arm   Patient Position: Sitting   Pulse: 60   Weight: 45.8 kg (100 lb 15.5 oz)   Height: 5' (1.524 m)     General: NAD, well nourished   Eyes: no tearing, discharge, no erythema   ENT: moist mucous membranes of the oral cavity, nares patent    Neck: Supple, full range of motion  Cardiovascular: Warm and well perfused, pulses equal and symmetrical  Lungs: Normal work of breathing, normal chest wall excursions  Skin: No rash, lesions, or breakdown on exposed skin  Psychiatry: Mood and affect are appropriate   Abdomen: soft, non tender, non distended  Extremeties: No cyanosis, clubbing or edema.    Neurological   MENTAL STATUS: Alert and oriented to person, place, and time. Attention and concentration within normal limits. Speech without dysarthria, able to name and repeat without difficulty. Recent and remote memory within normal limits   CRANIAL NERVES: Visual fields intact. PERRL. EOMI. Facial sensation intact. Face symmetrical. Hearing grossly intact. Full shoulder shrug bilaterally. Tongue protrudes midline   SENSORY: Sensation is intact to pin throughout.  Joint position perception intact. Negative Romberg.   MOTOR: Normal bulk and tone. No pronator drift.  5/5 deltoid, biceps, triceps, interosseous, hand   bilaterally. 5/5 iliopsoas, knee extension/flexion, foot dorsi/plantarflexion bilaterally.    REFLEXES: Symmetric and 2+ throughout. Toes down going bilaterally.   CEREBELLAR/COORDINATION/GAIT: masked facies, decreased blink rate, mild resting tremor of the L hand (better), bradykinesia with decrement L> R. Increased tone L> R. Decreased arm swing while walking.      I spent a total of 40 minutes on the day of the visit.   This includes face to face time and non-face to face time preparing to see the patient (eg, review of tests), obtaining and/or reviewing separately obtained history, documenting clinical information in the electronic or other health record, independently interpreting results and communicating results to the patient/family/caregiver, or care coordinator.      Quirino Miller MD  Neurology

## 2025-01-06 ENCOUNTER — PATIENT OUTREACH (OUTPATIENT)
Dept: ADMINISTRATIVE | Facility: OTHER | Age: 85
End: 2025-01-06
Payer: MEDICARE

## 2025-01-06 DIAGNOSIS — E53.8 B12 DEFICIENCY: ICD-10-CM

## 2025-01-07 ENCOUNTER — OFFICE VISIT (OUTPATIENT)
Dept: FAMILY MEDICINE | Facility: CLINIC | Age: 85
End: 2025-01-07
Payer: MEDICARE

## 2025-01-07 ENCOUNTER — LAB VISIT (OUTPATIENT)
Dept: LAB | Facility: HOSPITAL | Age: 85
End: 2025-01-07
Attending: FAMILY MEDICINE
Payer: MEDICARE

## 2025-01-07 VITALS
BODY MASS INDEX: 20.43 KG/M2 | OXYGEN SATURATION: 96 % | HEIGHT: 60 IN | DIASTOLIC BLOOD PRESSURE: 68 MMHG | SYSTOLIC BLOOD PRESSURE: 120 MMHG | WEIGHT: 104.06 LBS | HEART RATE: 61 BPM

## 2025-01-07 DIAGNOSIS — E53.8 B12 DEFICIENCY: ICD-10-CM

## 2025-01-07 DIAGNOSIS — H35.3231 EXUDATIVE AGE-RELATED MACULAR DEGENERATION OF BOTH EYES WITH ACTIVE CHOROIDAL NEOVASCULARIZATION: ICD-10-CM

## 2025-01-07 DIAGNOSIS — G20.C PARKINSONISM, UNSPECIFIED PARKINSONISM TYPE: ICD-10-CM

## 2025-01-07 DIAGNOSIS — N18.32 HYPERTENSIVE KIDNEY DISEASE WITH STAGE 3B CHRONIC KIDNEY DISEASE: Primary | ICD-10-CM

## 2025-01-07 DIAGNOSIS — G30.1 MILD LATE ONSET ALZHEIMER'S DEMENTIA WITH ANXIETY: ICD-10-CM

## 2025-01-07 DIAGNOSIS — I11.9 HYPERTENSIVE LEFT VENTRICULAR HYPERTROPHY, WITHOUT HEART FAILURE: ICD-10-CM

## 2025-01-07 DIAGNOSIS — R31.9 HEMATURIA, UNSPECIFIED TYPE: ICD-10-CM

## 2025-01-07 DIAGNOSIS — K64.4 EXTERNAL HEMORRHOIDS WITHOUT COMPLICATION: ICD-10-CM

## 2025-01-07 DIAGNOSIS — Z95.0 CARDIAC PACEMAKER IN SITU: ICD-10-CM

## 2025-01-07 DIAGNOSIS — M81.0 OSTEOPOROSIS, POST-MENOPAUSAL: ICD-10-CM

## 2025-01-07 DIAGNOSIS — I70.0 AORTIC ATHEROSCLEROSIS: ICD-10-CM

## 2025-01-07 DIAGNOSIS — R73.01 ABNORMAL FASTING GLUCOSE: ICD-10-CM

## 2025-01-07 DIAGNOSIS — F02.A4 MILD LATE ONSET ALZHEIMER'S DEMENTIA WITH ANXIETY: ICD-10-CM

## 2025-01-07 DIAGNOSIS — I12.9 HYPERTENSIVE KIDNEY DISEASE WITH STAGE 3B CHRONIC KIDNEY DISEASE: Primary | ICD-10-CM

## 2025-01-07 PROBLEM — I44.2 ATRIOVENTRICULAR BLOCK, COMPLETE: Status: RESOLVED | Noted: 2024-09-13 | Resolved: 2025-01-07

## 2025-01-07 LAB
ALBUMIN SERPL BCP-MCNC: 3.8 G/DL (ref 3.5–5.2)
ALP SERPL-CCNC: 68 U/L (ref 40–150)
ALT SERPL W/O P-5'-P-CCNC: 6 U/L (ref 10–44)
ANION GAP SERPL CALC-SCNC: 9 MMOL/L (ref 8–16)
AST SERPL-CCNC: 19 U/L (ref 10–40)
BASOPHILS # BLD AUTO: 0.04 K/UL (ref 0–0.2)
BASOPHILS NFR BLD: 0.5 % (ref 0–1.9)
BILIRUB SERPL-MCNC: 0.5 MG/DL (ref 0.1–1)
BUN SERPL-MCNC: 29 MG/DL (ref 8–23)
CALCIUM SERPL-MCNC: 10.3 MG/DL (ref 8.7–10.5)
CHLORIDE SERPL-SCNC: 106 MMOL/L (ref 95–110)
CO2 SERPL-SCNC: 26 MMOL/L (ref 23–29)
CREAT SERPL-MCNC: 1.2 MG/DL (ref 0.5–1.4)
DIFFERENTIAL METHOD BLD: NORMAL
EOSINOPHIL # BLD AUTO: 0.1 K/UL (ref 0–0.5)
EOSINOPHIL NFR BLD: 1.4 % (ref 0–8)
ERYTHROCYTE [DISTWIDTH] IN BLOOD BY AUTOMATED COUNT: 13.9 % (ref 11.5–14.5)
EST. GFR  (NO RACE VARIABLE): 44.1 ML/MIN/1.73 M^2
ESTIMATED AVG GLUCOSE: 126 MG/DL (ref 68–131)
GLUCOSE SERPL-MCNC: 99 MG/DL (ref 70–110)
HBA1C MFR BLD: 6 % (ref 4–5.6)
HCT VFR BLD AUTO: 41.9 % (ref 37–48.5)
HGB BLD-MCNC: 13.6 G/DL (ref 12–16)
IMM GRANULOCYTES # BLD AUTO: 0.03 K/UL (ref 0–0.04)
IMM GRANULOCYTES NFR BLD AUTO: 0.4 % (ref 0–0.5)
LYMPHOCYTES # BLD AUTO: 1.5 K/UL (ref 1–4.8)
LYMPHOCYTES NFR BLD: 19 % (ref 18–48)
MCH RBC QN AUTO: 29.6 PG (ref 27–31)
MCHC RBC AUTO-ENTMCNC: 32.5 G/DL (ref 32–36)
MCV RBC AUTO: 91 FL (ref 82–98)
MONOCYTES # BLD AUTO: 0.6 K/UL (ref 0.3–1)
MONOCYTES NFR BLD: 7.9 % (ref 4–15)
NEUTROPHILS # BLD AUTO: 5.4 K/UL (ref 1.8–7.7)
NEUTROPHILS NFR BLD: 70.8 % (ref 38–73)
NRBC BLD-RTO: 0 /100 WBC
PLATELET # BLD AUTO: 245 K/UL (ref 150–450)
PMV BLD AUTO: 11.2 FL (ref 9.2–12.9)
POTASSIUM SERPL-SCNC: 3.7 MMOL/L (ref 3.5–5.1)
PROT SERPL-MCNC: 8.2 G/DL (ref 6–8.4)
RBC # BLD AUTO: 4.59 M/UL (ref 4–5.4)
SODIUM SERPL-SCNC: 141 MMOL/L (ref 136–145)
WBC # BLD AUTO: 7.69 K/UL (ref 3.9–12.7)

## 2025-01-07 PROCEDURE — 99999 PR PBB SHADOW E&M-EST. PATIENT-LVL IV: CPT | Mod: PBBFAC,,, | Performed by: FAMILY MEDICINE

## 2025-01-07 PROCEDURE — 99214 OFFICE O/P EST MOD 30 MIN: CPT | Mod: S$PBB,,, | Performed by: FAMILY MEDICINE

## 2025-01-07 PROCEDURE — 80053 COMPREHEN METABOLIC PANEL: CPT | Performed by: FAMILY MEDICINE

## 2025-01-07 PROCEDURE — 83036 HEMOGLOBIN GLYCOSYLATED A1C: CPT | Performed by: FAMILY MEDICINE

## 2025-01-07 PROCEDURE — 36415 COLL VENOUS BLD VENIPUNCTURE: CPT | Mod: PO | Performed by: FAMILY MEDICINE

## 2025-01-07 PROCEDURE — 85025 COMPLETE CBC W/AUTO DIFF WBC: CPT | Performed by: FAMILY MEDICINE

## 2025-01-07 PROCEDURE — 99214 OFFICE O/P EST MOD 30 MIN: CPT | Mod: PBBFAC,PO | Performed by: FAMILY MEDICINE

## 2025-01-07 RX ORDER — AMLODIPINE BESYLATE 10 MG/1
10 TABLET ORAL
COMMUNITY
Start: 2024-11-25

## 2025-01-07 RX ORDER — CYANOCOBALAMIN 1000 UG/ML
INJECTION, SOLUTION INTRAMUSCULAR; SUBCUTANEOUS
Qty: 10 ML | Refills: 2 | Status: SHIPPED | OUTPATIENT
Start: 2025-01-07

## 2025-01-07 RX ORDER — HYDROCORTISONE 25 MG/G
CREAM TOPICAL 2 TIMES DAILY
Qty: 20 G | Refills: 5 | Status: SHIPPED | OUTPATIENT
Start: 2025-01-07

## 2025-01-07 NOTE — PROGRESS NOTES
Subjective:       Patient ID: Trudy Horvath is a 84 y.o. female.    Chief Complaint: Hypertension    History of Present Illness    CHIEF COMPLAINT:  Trudy presents today for follow-up on chronic medical issues including high blood pressure and chronic kidney disease. She is accompanied to the visit by her daughter.    HYPERTENSION:  She reports perfect blood pressure readings at home, monitored weekly by visiting nurse. She denies racing heart, blurry vision, or headaches. Current medications include amlodipine 10 mg daily, hydrochlorothiazide 12.5 mg daily, irbesartan 300/12.5 mg daily, and metoprolol 50 mg twice daily.    CARDIOVASCULAR:  She had a pacemaker placement last year and denies experiencing extra beats or heart racing.    NEUROLOGICAL CONDITIONS:  She is under Dr. Miller's care for Alzheimer's and Parkinsonism. Carbidopa levodopa 25/100 mg was increased after Christmas to 1.5 tablets 3 times daily, with noted improvement in tremors. She takes memantidine 5 mg twice daily for Alzheimer's.    CHRONIC KIDNEY DISEASE:  She has stage 3b chronic kidney disease and is being monitored with instructions to avoid NSAIDs including ibuprofen, Motrin, and Aleve.    OTHER MEDICAL CONDITIONS:  She has prediabetes based on abnormal fasting blood sugar. She has severe osteoporosis and expresses preference for Prolia injections every 6 months over oral bisphosphonates. Cholesterol is well-controlled on risuvastatin 20 mg daily with good levels in October 2024.    CURRENT TREATMENTS:  She receives monthly B12 injections following an initial series of four weekly shots.    GENITOURINARY:  She reports blood on towel after urination, possibly from hemorrhoids. She denies pain or burning with urination.      ROS:  Head: -headaches  Eyes: -vision changes  Cardiovascular: -palpitations  Genitourinary: -painful urination          Objective:      Physical Exam    General: In no acute distress.  Head: Normocephalic. Non  traumatic.  Eyes:  EOMs full. Conjunctivae clear. Fundi grossly normal.  Neck: Supple. No masses. No thyromegaly. No bruits.  Chest: Lungs clear. No rales. No rhonchi. No wheezes.  Heart: RRR. No murmurs. No rubs. No gallops.  Abdomen: Soft. No tenderness. No masses. BS normal.  : Normal external genitalia. No lesions.   Rectal: External hemorrhoids.   Extremities: Warm. Well perfused. No upper extremity edema. No lower extremity edema.   Skin: Normal. No rashes. No lesions noted.  Vitals: BLOOD PRESSURE: 120/68.              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  Dispense: 2 mL; Refill: 3    Hematuria, unspecified type  -     POCT urinalysis, dipstick or tablet reag  -     Urinalysis, Reflex to Urine Culture Urine, Clean Catch; Future; Expected date: 01/07/2025    External hemorrhoids without complication  -     hydrocortisone 2.5 % cream; Apply topically 2 (two) times daily.  Dispense: 20 g; Refill: 5    Exudative age-related macular degeneration of both eyes with active choroidal neovascularization    Aortic atherosclerosis    Cardiac pacemaker in situ         Plan:       Assessment & Plan    DISEASE WITH LATE ONSET:  - Continued memantine 5 mg twice daily for Alzheimer's management.  - Emphasized the importance of brain-stimulating activities, regular exercise, maintaining a healthy weight, and activities to protect and strengthen  the brain for Alzheimer's prevention.  - Trudy is being followed by Dr. Miller for Alzheimer's disease.    UNSPECIFIED PARKINSONISM TYPE:  - Continued carbidopa-levodopa  mg, 1.5 tablets 3 times daily.  - Noted that Parkinsonism symptoms are improving with the current dosage, especially the patient's tremors at rest.  - The dose was recently increased.  - Trudy is being followed by Dr. Miller for Parkinsonism.    LVH:  - Trudy denies chest pain, shortness of breath, or feeling of racing heart.  - Noted a history of left ventricular hypertrophy due to high blood pressure, which has been stable.  - Continued metoprolol 50 mg twice daily for blood pressure and heart management.    BLOCK, COMPLETE:  - Trudy had a pacemaker placed last year and denies feeling extra beats or racing heart.  - Scheduled follow-up visits with cardiologist Dr. Davis, including pacemaker check.    CHRONIC KIDNEY DISEASE WITH STAGE 1 THROUGH STAGE 4 CHRONIC KIDNEY DISEASE, OR UNSPECIFIED CHRONIC KIDNEY DISEASE:  - Noted that chronic kidney disease stage 3b is stable.  - Emphasized the importance of avoiding NSAIDs to protect kidney function.  - Blood pressure is well-controlled at 120/68, with the patient reporting perfect readings at home.  - Continued amlodipine 10 mg daily, hydrochlorothiazide 12.5 mg daily, and irbesartan/HCTZ 300/12.5 mg daily for blood pressure management.  - Planned to obtain labs today for further evaluation.    HYPERTENSION:  - Continue regular blood pressure checks with visiting nurse every Monday.  - Trudy denies blurry vision or headaches.    HYPERLIPIDEMIA:  - Cholesterol is at goal on current statin therapy.  - October 2024 levels: total cholesterol 194, HDL 66, triglycerides 107, .  - Continue rosuvastatin 20 mg daily.    OSTEOPOROSIS:  - Start Prolia injection every 6 months for osteoporosis treatment, chosen over oral bisphosphonates.  - Ordered prescription through specialty pharmacy.  -  Instructed patient to maintain calcium and vitamin D intake and engage in weight-bearing exercises for bone health.    HEMORRHOIDS:  - Suspected hemorrhoids based on physical exam and reported symptoms.  - [place] reports blood on toilet paper after wiping, but no pain.  - External exam revealed external hemorrhoids with no active bleeding.  - Discussed causes and symptoms with patient.  - Prescribed Anusol HC cream, to be applied twice daily as needed.    URINARY TRACT INFECTION:  - [place] reports possible blood in urine with lower back pain, but denies pain or burning during urination, increased frequency or urgency.  - Ordered urinalysis with reflex to culture to rule out urinary tract infection.  - Instructed patient to bring a urine sample to the lab if unable to provide during the visit.      PREDIABETES:  - Noted history of abnormal fasting blood sugar (prediabetes).  - Planned to check fasting blood sugar.    VACCINATION:  - [place] to receive shingles and RSV vaccines.    MEDICATIONS/SUPPLEMENTS:  - Continue monthly B12 injections.    LABS:  - Ordered CBC to check for anemia.      - Follow up in 6 months.        I spent a total of 30 minutes on the day of the visit.This includes face to face time and non-face to face time preparing to see the patient (eg, review of tests), obtaining and/or reviewing separately obtained history, documenting clinical information in the electronic or other health record, independently interpreting results and communicating results to the patient/family/caregiver, or care coordinator.       This note was generated with the assistance of ambient listening technology. Verbal consent was obtained by the patient and accompanying visitor(s) for the recording of patient appointment to facilitate this note. I attest to having reviewed and edited the generated note for accuracy, though some syntax or spelling errors may persist. Please contact the author of this note for any clarification.

## 2025-01-13 ENCOUNTER — TELEPHONE (OUTPATIENT)
Dept: INTERNAL MEDICINE | Facility: CLINIC | Age: 85
End: 2025-01-13
Payer: MEDICARE

## 2025-01-13 DIAGNOSIS — M81.0 OSTEOPOROSIS, POST-MENOPAUSAL: Primary | ICD-10-CM

## 2025-01-13 NOTE — PROGRESS NOTES
CHW - Case Closure    This Community Health Worker spoke to caregiver via telephone today.   Pt/Caregiver reported: D/g denied pt needing any ss assistance at this time. SDOH completed verified updated by CHW  Pt/Caregiver denied any additional needs at this time and agrees with episode closure at this time.  Provided caregiver with Community Health Worker's contact information and encouraged him/her to contact this Community Health Worker if additional needs arise.

## 2025-01-13 NOTE — TELEPHONE ENCOUNTER
LVM for Pt to call office back. Called regardig scheduling prolia shot. Please schedule appt around 2 weeks from referral. Transfer call to [phone] for scheduling.

## 2025-01-25 ENCOUNTER — EXTERNAL HOME HEALTH (OUTPATIENT)
Dept: HOME HEALTH SERVICES | Facility: HOSPITAL | Age: 85
End: 2025-01-25
Payer: MEDICARE

## 2025-01-31 ENCOUNTER — TELEPHONE (OUTPATIENT)
Dept: INTERNAL MEDICINE | Facility: CLINIC | Age: 85
End: 2025-01-31
Payer: MEDICARE

## 2025-01-31 NOTE — TELEPHONE ENCOUNTER
Called patient to reschedule her prolia injection. No answer. LVM to call the office () to schedule.

## 2025-02-04 ENCOUNTER — TELEPHONE (OUTPATIENT)
Dept: FAMILY MEDICINE | Facility: CLINIC | Age: 85
End: 2025-02-04
Payer: MEDICARE

## 2025-02-04 NOTE — TELEPHONE ENCOUNTER
Called multiples times to schedule prolia injection. No answer. Left VM to call the office to schedule the appointment.

## 2025-02-05 ENCOUNTER — HOSPITAL ENCOUNTER (OUTPATIENT)
Dept: CARDIOLOGY | Facility: HOSPITAL | Age: 85
Discharge: HOME | End: 2025-02-05
Attending: INTERNAL MEDICINE
Payer: MEDICARE

## 2025-02-05 VITALS — BODY MASS INDEX: 20.42 KG/M2 | HEIGHT: 60 IN | WEIGHT: 104 LBS

## 2025-02-05 DIAGNOSIS — I51.7 LVH (LEFT VENTRICULAR HYPERTROPHY): ICD-10-CM

## 2025-02-05 LAB
APICAL FOUR CHAMBER EJECTION FRACTION: 61 %
APICAL TWO CHAMBER EJECTION FRACTION: 71 %
ASCENDING AORTA: 2.85 CM
AV INDEX (PROSTH): 1.14
AV MEAN GRADIENT: 7 MMHG
AV PEAK GRADIENT: 10 MMHG
AV VALVE AREA BY VELOCITY RATIO: 2.7 CM²
AV VALVE AREA: 2.9 CM²
AV VELOCITY RATIO: 1.06
BSA FOR ECHO PROCEDURE: 1.41 M2
CV ECHO LV RWT: 0.46 CM
DOP CALC AO PEAK VEL: 1.6 M/S
DOP CALC AO VTI: 37.5 CM
DOP CALC LVOT AREA: 2.5 CM2
DOP CALC LVOT DIAMETER: 1.8 CM
DOP CALC LVOT PEAK VEL: 1.7 M/S
DOP CALC LVOT STROKE VOLUME: 108.3 CM3
DOP CALC MV VTI: 64.9 CM
DOP CALCLVOT PEAK VEL VTI: 42.6 CM
E WAVE DECELERATION TIME: 296 MSEC
E/A RATIO: 0.91
E/E' RATIO: 35 M/S
ECHO LV POSTERIOR WALL: 0.8 CM (ref 0.6–1.1)
FRACTIONAL SHORTENING: 28.6 % (ref 28–44)
HR MV ECHO: 60 BPM
INTERVENTRICULAR SEPTUM: 1 CM (ref 0.6–1.1)
IVC DIAMETER: 1.3 CM
LEFT ATRIUM AREA SYSTOLIC (APICAL 2 CHAMBER): 14.82 CM2
LEFT ATRIUM AREA SYSTOLIC (APICAL 4 CHAMBER): 16.77 CM2
LEFT ATRIUM VOLUME INDEX MOD: 28 ML/M2
LEFT ATRIUM VOLUME MOD: 39 ML
LEFT INTERNAL DIMENSION IN SYSTOLE: 2.5 CM (ref 2.1–4)
LEFT VENTRICLE DIASTOLIC VOLUME INDEX: 36.03 ML/M2
LEFT VENTRICLE DIASTOLIC VOLUME: 50.8 ML
LEFT VENTRICLE END DIASTOLIC VOLUME APICAL 2 CHAMBER: 44.61 ML
LEFT VENTRICLE END DIASTOLIC VOLUME APICAL 4 CHAMBER: 38.25 ML
LEFT VENTRICLE END SYSTOLIC VOLUME APICAL 2 CHAMBER: 34.28 ML
LEFT VENTRICLE END SYSTOLIC VOLUME APICAL 4 CHAMBER: 44.8 ML
LEFT VENTRICLE MASS INDEX: 63 G/M2
LEFT VENTRICLE SYSTOLIC VOLUME INDEX: 15.2 ML/M2
LEFT VENTRICLE SYSTOLIC VOLUME: 21.4 ML
LEFT VENTRICULAR INTERNAL DIMENSION IN DIASTOLE: 3.5 CM (ref 3.5–6)
LEFT VENTRICULAR MASS: 88.8 G
LV LATERAL E/E' RATIO: 34.5 M/S
LV SEPTAL E/E' RATIO: 34.5 M/S
LVED V (TEICH): 50.8 ML
LVES V (TEICH): 21.4 ML
LVOT MG: 6.87 MMHG
LVOT MV: 1.24 CM/S
MR PISA EROA: 0.3 CM2
MV A" WAVE DURATION": 163.65 MSEC
MV MEAN GRADIENT: 6 MMHG
MV PEAK A VEL: 1.52 M/S
MV PEAK E VEL: 1.38 M/S
MV PEAK GRADIENT: 15 MMHG
MV STENOSIS PRESSURE HALF TIME: 85.71 MS
MV VALVE AREA BY CONTINUITY EQUATION: 1.67 CM2
MV VALVE AREA P 1/2 METHOD: 2.57 CM2
OHS CV RV/LV RATIO: 0.97 CM
OHS LV EJECTION FRACTION SIMPSONS BIPLANE MOD: 67 %
PISA MRMAX VEL: 5.48 M/S
PISA RADIUS: 0.88 CM
PISA TR MAX VEL: 2.9 M/S
PISA VN NYQUIST MS: 0.34 M/S
PISA VN NYQUIST: 0.34 M/S
PULM VEIN S/D RATIO: 1.64
PV MV: 0.76 M/S
PV PEAK D VEL: 0.36 M/S
PV PEAK GRADIENT: 4 MMHG
PV PEAK S VEL: 0.59 M/S
PV PEAK VELOCITY: 0.95 M/S
RA PRESSURE ESTIMATED: 3 MMHG
RA VOL SYS: 20.5 ML
RIGHT ATRIAL AREA: 9.9 CM2
RIGHT ATRIUM VOLUME AREA LENGTH APICAL 4 CHAMBER: 19.76 ML
RIGHT VENTRICLE DIASTOLIC BASEL DIMENSION: 3.4 CM
RV TB RVSP: 6 MMHG
RV TISSUE DOPPLER FREE WALL SYSTOLIC VELOCITY 1 (APICAL 4 CHAMBER VIEW): 11.31 CM/S
SINUS: 2.76 CM
STJ: 2.45 CM
TDI LATERAL: 0.04 M/S
TDI SEPTAL: 0.04 M/S
TDI: 0.04 M/S
TR MAX PG: 34 MMHG
TRICUSPID ANNULAR PLANE SYSTOLIC EXCURSION: 1.93 CM
TV REST PULMONARY ARTERY PRESSURE: 37 MMHG
Z-SCORE OF LEFT VENTRICULAR DIMENSION IN END DIASTOLE: -2.18
Z-SCORE OF LEFT VENTRICULAR DIMENSION IN END SYSTOLE: -0.62

## 2025-02-05 PROCEDURE — 93306 TTE W/DOPPLER COMPLETE: CPT | Mod: 26,,, | Performed by: INTERNAL MEDICINE

## 2025-02-05 PROCEDURE — 93306 TTE W/DOPPLER COMPLETE: CPT

## 2025-02-12 ENCOUNTER — OFFICE VISIT (OUTPATIENT)
Dept: CARDIOLOGY | Facility: CLINIC | Age: 85
End: 2025-02-12
Payer: MEDICARE

## 2025-02-12 ENCOUNTER — TELEPHONE (OUTPATIENT)
Dept: CARDIOLOGY | Facility: CLINIC | Age: 85
End: 2025-02-12
Payer: MEDICARE

## 2025-02-12 VITALS
HEART RATE: 58 BPM | HEIGHT: 60 IN | BODY MASS INDEX: 20.74 KG/M2 | SYSTOLIC BLOOD PRESSURE: 125 MMHG | DIASTOLIC BLOOD PRESSURE: 66 MMHG | OXYGEN SATURATION: 99 % | WEIGHT: 105.63 LBS

## 2025-02-12 DIAGNOSIS — I45.9 HEART BLOCK: ICD-10-CM

## 2025-02-12 DIAGNOSIS — I51.89 DIASTOLIC DYSFUNCTION: ICD-10-CM

## 2025-02-12 DIAGNOSIS — I51.7 LVH (LEFT VENTRICULAR HYPERTROPHY): Primary | ICD-10-CM

## 2025-02-12 DIAGNOSIS — Z95.0 CARDIAC PACEMAKER IN SITU: ICD-10-CM

## 2025-02-12 DIAGNOSIS — I10 HYPERTENSION, UNSPECIFIED TYPE: ICD-10-CM

## 2025-02-12 PROCEDURE — 99999 PR PBB SHADOW E&M-EST. PATIENT-LVL IV: CPT | Mod: PBBFAC,,, | Performed by: INTERNAL MEDICINE

## 2025-02-12 PROCEDURE — G2211 COMPLEX E/M VISIT ADD ON: HCPCS | Mod: S$PBB,,, | Performed by: INTERNAL MEDICINE

## 2025-02-12 PROCEDURE — 99214 OFFICE O/P EST MOD 30 MIN: CPT | Mod: PBBFAC,PN | Performed by: INTERNAL MEDICINE

## 2025-02-12 PROCEDURE — 99214 OFFICE O/P EST MOD 30 MIN: CPT | Mod: S$PBB,,, | Performed by: INTERNAL MEDICINE

## 2025-02-12 NOTE — TELEPHONE ENCOUNTER
Spoke with [employer] to order the patient a home monitor.  Patient does not have a smart phone for the chidi.  The patient should receive the monitor in 7-10 days.      Message sent to [facility] to add patient for remote monitoring.

## 2025-02-13 NOTE — PROGRESS NOTES
Subjective:   @Patient ID:  Trudy Horvath is a 84 y.o. female who presents for follow-up of No chief complaint on file.      HPI:     83 year old female previously admitted to the hospital for syncope noted to have complete heart block now status post dual-chamber pacemaker.. She has a past medical history of  Hyperlipidemia, Hypertension, dementia, CKD.  No complications post pacemaker placement.  Left pectoral region now healed properly.  Previously had some swelling of the feet for which echocardiogram was ordered.  No significant gradient across the LVOT noted.  Previously had around 60 mm Hg gradient across the LVOT which has since resolved with taking metoprolol had restarting back antihypertensive medications     Has not received any remote interrogation device from [employer].  Overall no complaints.  A1c 6 creatinine 1.2     Results for orders placed during the hospital encounter of 02/05/25    Echo    Interpretation Summary    Left Ventricle: There is concentric remodeling. There is normal systolic function with a visually estimated ejection fraction of 65 - 70%. Quantitated ejection fraction is 67%. Diastolic function cannot be reliably determined in the presence of mitral annular calcification.  No significant gradient across the LVOT    Left Ventricle: Mildly increased wall thickness. There is concentric remodeling.    Right Ventricle: Normal right ventricular cavity size. Wall thickness is normal. Systolic function is normal.    Left Atrium: Left atrium is dilated.    Mitral Valve: There is bileaflet sclerosis. There is moderate posterior mitral annular calcification present. Mildly calcified subvalvular apparatus. There is moderate regurgitation with a posterolateral eccentrically directed jet.    Tricuspid Valve: There is mild to moderate regurgitation.    Pulmonary Artery: The estimated pulmonary artery systolic pressure is 37 mmHg.    IVC/SVC: Normal venous pressure at 3 mmHg.      No results  found for this or any previous visit.      No results found for this or any previous visit.      Please document below the medical necessity for continuous telemetry monitoring or discontinue the current order if appropriate.    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 02/06/2024    Resting tremor 02/06/2024    Exudative age-related macular degeneration of both eyes with active choroidal neovascularization 08/20/2019    Hypercalcemia 02/05/2019    Left ventricular hypertrophy due to hypertensive disease 02/06/2015    Diastolic dysfunction 02/06/2015    Hypokalemia 04/07/2014    Cyst of breast 05/20/2013    Osteoporosis, post-menopausal     Hypertensive kidney disease with stage 3b chronic kidney disease 09/17/2012    Hyperlipidemia 09/17/2012                    LAST HbA1c  Lab Results   Component Value Date    HGBA1C 6.0 (H) 01/07/2025       Lipid panel  Lab Results   Component Value Date    CHOL 194 10/23/2024    CHOL 308 (H) 08/26/2024    CHOL 224 (H) 02/06/2024     Lab Results   Component Value Date    HDL 66 10/23/2024    HDL 69 08/26/2024    HDL 60 02/06/2024     Lab Results   Component Value Date    LDLCALC 106.6 10/23/2024    LDLCALC 217.2 (H) 08/26/2024    LDLCALC 141.6 02/06/2024     Lab Results   Component Value Date    TRIG 107 10/23/2024    TRIG 109 08/26/2024    TRIG 112 02/06/2024     Lab Results   Component Value Date    CHOLHDL 34.0 10/23/2024    CHOLHDL 22.4 08/26/2024    CHOLHDL 26.8 02/06/2024            Review of Systems   Constitutional: Negative for chills and fever.   HENT:  Negative for hearing loss and nosebleeds.    Eyes:  Negative for blurred vision.    Cardiovascular:         As in HPI    Respiratory:  Negative for cough, hemoptysis and shortness of breath.    Endocrine: Negative for cold intolerance and polyuria.   Hematologic/Lymphatic: Negative for bleeding problem.   Skin:  Negative for itching.   Musculoskeletal:  Negative for falls.   Gastrointestinal:  Negative for abdominal pain and hematochezia.   Genitourinary:  Negative for hematuria.   Neurological:  Negative for dizziness and loss of balance.   Psychiatric/Behavioral:  Negative for altered mental status and depression.        Objective:   Physical Exam  Constitutional:       Appearance: She is well-developed.   HENT:      Head: Normocephalic and atraumatic.   Eyes:      Conjunctiva/sclera: Conjunctivae normal.   Neck:      Vascular: No carotid bruit or JVD.   Cardiovascular:      Rate and Rhythm: Normal rate and regular rhythm.      Pulses:           Carotid pulses are 2+ on the right side and 2+ on the left side.       Radial pulses are 2+ on the right side and 2+ on the left side.      Heart sounds: Normal heart sounds. No murmur heard.     No friction rub. No gallop.   Pulmonary:      Effort: Pulmonary effort is normal. No respiratory distress.      Breath sounds: Normal breath sounds. No stridor. No wheezing.   Abdominal:      General: Abdomen is flat.      Palpations: Abdomen is soft.   Musculoskeletal:      Cervical back: Neck supple.      Right lower leg: No edema.      Left lower leg: No edema.   Skin:     General: Skin is warm and dry.   Neurological:      Mental Status: She is alert and oriented to person, place, and time.   Psychiatric:         Behavior: Behavior normal.         Assessment:     1. LVH (left ventricular hypertrophy)    2. Heart block    3. Cardiac pacemaker in situ    4. Hypertension, unspecified type    5. Diastolic dysfunction [I51.89]        Plan:     -we will get remote monitoring device from [employer] to be sent to her home.  Overall no acute issues.  -continue  irbesartan/HCTZ on Lopressor.  -echocardiogram reviewed.  Preserved left ventricular ejection fraction, no significant gradient across the LVOT.  RVSP 37 mm Hg  -follow up within 6 months or as needed.      Pertinent cardiac images and EKG reviewed independently.    Continue with current medical plan and lifestyle changes.  Return sooner for concerns or questions. If symptoms persist go to the ED  I have reviewed all pertinent data including patient's medical history in detail and updated the computerized patient record.     No orders of the defined types were placed in this encounter.      Follow up as scheduled.     She expressed verbal understanding and agreed with the plan    Patient's Medications   New Prescriptions    No medications on file   Previous Medications    AMLODIPINE (NORVASC) 10 MG TABLET    Take 10 mg by mouth.    AMLODIPINE (NORVASC) 5 MG TABLET    Take 1 tablet (5 mg total) by mouth every evening.    ASPIRIN 81 MG CHEW    Take 1 tablet (81 mg total) by mouth once daily.    CARBIDOPA-LEVODOPA  MG (SINEMET)  MG PER TABLET    Take 1.5 tablets by mouth 3 (three) times daily.    CARVEDILOL (COREG) 3.125 MG TABLET    Take 3.125 mg by mouth 2 (two) times daily.    CHOLECALCIFEROL, VITAMIN D3, (VITAMIN D3) 50 MCG (2,000 UNIT) CAP    Take 1 capsule by mouth once daily.    CYANOCOBALAMIN 1,000 MCG/ML INJECTION    Inject 1ml weekly for 4 weeks, then 1ml every month for a year    DENOSUMAB (PROLIA) 60 MG/ML SYRG    Inject 1 mL (60 mg total) into the skin every 6 (six) months.    DOCOSAHEXANOIC ACID/VIT C/LUT (EYE HEALTH ORAL)    Take 1 tablet by mouth once daily.    HYDROCHLOROTHIAZIDE (MICROZIDE) 12.5 MG CAPSULE    Take 1 capsule (12.5 mg total) by mouth once daily.    HYDROCORTISONE 2.5 % CREAM    Apply topically 2 (two) times daily.    IRBESARTAN-HYDROCHLOROTHIAZIDE (AVALIDE) 300-12.5 MG PER TABLET    Take 1 tablet by mouth once daily.    MEMANTINE (NAMENDA) 5 MG TAB    Take 1 tablet (5 mg  total) by mouth 2 (two) times daily.    METOPROLOL TARTRATE (LOPRESSOR) 50 MG TABLET    Take 1 tablet (50 mg total) by mouth 2 (two) times daily.    ONDANSETRON (ZOFRAN) 4 MG TABLET    Take 4 mg by mouth every 4 (four) hours as needed.    ONDANSETRON (ZOFRAN-ODT) 4 MG TBDL    Take 1 tablet (4 mg total) by mouth every 24 hours as needed (nausea).    ROSUVASTATIN (CRESTOR) 20 MG TABLET    Take 1 tablet (20 mg total) by mouth once daily.   Modified Medications    No medications on file   Discontinued Medications    No medications on file        Santiago Davis M.D

## 2025-03-18 ENCOUNTER — OFFICE VISIT (OUTPATIENT)
Facility: CLINIC | Age: 85
End: 2025-03-18
Payer: MEDICARE

## 2025-03-18 VITALS
SYSTOLIC BLOOD PRESSURE: 135 MMHG | HEIGHT: 60 IN | DIASTOLIC BLOOD PRESSURE: 68 MMHG | WEIGHT: 105.5 LBS | BODY MASS INDEX: 20.71 KG/M2

## 2025-03-18 DIAGNOSIS — G30.1 MILD LATE ONSET ALZHEIMER'S DEMENTIA WITH ANXIETY: ICD-10-CM

## 2025-03-18 DIAGNOSIS — F02.A4 MILD LATE ONSET ALZHEIMER'S DEMENTIA WITH ANXIETY: ICD-10-CM

## 2025-03-18 DIAGNOSIS — G20.C PARKINSONISM, UNSPECIFIED PARKINSONISM TYPE: Primary | ICD-10-CM

## 2025-03-18 PROCEDURE — 99999 PR PBB SHADOW E&M-EST. PATIENT-LVL III: CPT | Mod: PBBFAC,,, | Performed by: STUDENT IN AN ORGANIZED HEALTH CARE EDUCATION/TRAINING PROGRAM

## 2025-03-18 PROCEDURE — 99213 OFFICE O/P EST LOW 20 MIN: CPT | Mod: PBBFAC,PN | Performed by: STUDENT IN AN ORGANIZED HEALTH CARE EDUCATION/TRAINING PROGRAM

## 2025-03-18 NOTE — PROGRESS NOTES
[facility] - NEUROLOGY  [facility], [place]    Date: 3/18/25  Patient Name: Trudy Horvath   MRN: [id number]   PCP: Maria C Guzman A.M.  Referring Provider: No ref. provider found    Assessment:   Trudy Horvath is a 84 y.o. female presenting for Alzheimer's and parkinsonism follow up. Doing better with CL 1.5 TID. Still not interested in doing PT or speech therapy. Tolerating namenda well. No other concerns of red flags at this time. Follow up in 4 months at [facility] . They verbalized understanding and agreed with the plan     Plan:     - Continue sinemet to 1.5 tab TID. 7 - 12 - 5  - Continue memantine 5 mg BID.   - Will think about BIG or Loud therapy.   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 provider found for the evaluation of Alzheimer's and parkinsonism follow up. A copy of this note will be sent to the referring physician.      Interval history 3/18/25:   Ms. Trudy Horvath is a 84 y.o. female presenting for Alzheimer's and parkinsonism follow up. Doing better with CL 1.5 TID. Still not interested in doing PT or speech therapy. Tolerating namenda well. No other concerns of red flags at this time.    Per previous by myself on 12/12/24:    Assessment:   Trudy Horvath is a 84 y.o. female presenting for Alzheimer's and parkinsonism follow up. Case most consistent with Alzheimer's disease + parkinsonism. She also has B12 deficiency. She did notice improvement and no side effects with sinemet. Will plan to increase to 1.5 tabs TID. Will change scheduling to 7 - 12 - 5 pm. We spoke about the importance of the different therapy modalities but she is not interested in these at this  time. Once again, not interested in the Syn-one biopsy at this time. Prefers to go up on sinemet. We will continue B12 shots. We will see how much improvement she gets from sinemet before we consider increasing memantine. We will plan a follow up in 3 months. The patient and her daughter verbalized understanding and agreed with the plan      Plan:      - Not interested in Syn-one biopsy at this time. Prefers to go up on sinemet   - Increase sinemet to 1.5 tab TID. 7 - 12 - 5  - Continue memantine 5 mg BID.   - Not interested BIG or Loud therapy at this time  - Constipation under control   - No dysphagia at this time   - Continue B12 supplementation     PAST MEDICAL HISTORY:  Past Medical History:   Diagnosis Date    Atrioventricular block, complete 09/13/2024    Hyperlipidemia     Hypertension     Increased glucose level     risk of diabetes    Macular degeneration (senile) of retina, unspecified     Osteoporosis, post-menopausal        PAST SURGICAL HISTORY:  Past Surgical History:   Procedure Laterality Date    A-V CARDIAC PACEMAKER INSERTION N/A 9/17/2024    Procedure: INSERTION, CARDIAC PACEMAKER, DUAL CHAMBER;  Surgeon: Santiago Davis MD;  Location: [facility] CATH LAB/EP;  Service: Cardiology;  Laterality: N/A;    BREAST BIOPSY      benign    INSERTION, PACEMAKER, TEMPORARY TRANSVENOUS N/A 9/13/2024    Procedure: Insertion, Pacemaker, Temporary Transvenous;  Surgeon: Bre Melgar MD;  Location: [facility] CATH LAB/EP;  Service: Cardiology;  Laterality: N/A;       CURRENT MEDS:  Current Medications[1]    ALLERGIES:  Review of patient's allergies indicates:  No Known Allergies    FAMILY HISTORY:  Family History   Problem Relation Name Age of Onset    Heart disease Mother      Hypertension Father      Stroke Father      No Known Problems Sister 2     No Known Problems Daughter 1        SOCIAL HISTORY:  Social History[2]    Review of Systems:  12 system review of systems is negative except for the symptoms mentioned in  HPI.      Objective:     Vitals:    03/18/25 1121   BP: 135/68   BP Location: Left arm   Patient Position: Sitting   Pulse: (P) 60   Weight: 47.8 kg (105 lb 7.8 oz)   Height: 5' (1.524 m)     General: NAD, well nourished   Eyes: no tearing, discharge, no erythema   ENT: moist mucous membranes of the oral cavity, nares patent    Neck: Supple, full range of motion  Cardiovascular: Warm and well perfused, pulses equal and symmetrical  Lungs: Normal work of breathing, normal chest wall excursions  Skin: No rash, lesions, or breakdown on exposed skin  Psychiatry: Mood and affect are appropriate   Abdomen: soft, non tender, non distended  Extremeties: No cyanosis, clubbing or edema.    Neurological   MENTAL STATUS: Alert and oriented to person, place, and time. Attention and concentration within normal limits. Speech without dysarthria, able to name and repeat without difficulty. Recent and remote memory within normal limits   CRANIAL NERVES: Visual fields intact. PERRL. EOMI. Facial sensation intact. Face symmetrical. Hearing grossly intact. Full shoulder shrug bilaterally. Tongue protrudes midline   SENSORY: Sensation is intact to pin throughout.  Joint position perception intact. Negative Romberg.   MOTOR: Normal bulk and tone. No pronator drift.  5/5 deltoid, biceps, triceps, interosseous, hand  bilaterally. 5/5 iliopsoas, knee extension/flexion, foot dorsi/plantarflexion bilaterally.    REFLEXES: Symmetric and 2+ throughout. Toes down going bilaterally.   CEREBELLAR/COORDINATION/GAIT: masked facies, decreased blink rate, mild resting tremor of the L hand (better), bradykinesia with decrement L> R. Increased tone L> R. Decreased arm swing while walking.     I spent a total of  minutes on the day of the visit.   This includes face to face time and non-face to face time preparing to see the patient (eg, review of tests), obtaining and/or reviewing separately obtained history, documenting clinical information in the  electronic or other health record, independently interpreting results and communicating results to the patient/family/caregiver, or care coordinator.      Quirino Miller MD  Neurology           [1]   Current Outpatient Medications   Medication Sig Dispense Refill    amLODIPine (NORVASC) 10 MG tablet Take 10 mg by mouth.      aspirin 81 MG Chew Take 1 tablet (81 mg total) by mouth once daily. 90 tablet 3    carbidopa-levodopa  mg (SINEMET)  mg per tablet Take 1.5 tablets by mouth 3 (three) times daily. 135 tablet 11    carvediloL (COREG) 3.125 MG tablet Take 3.125 mg by mouth 2 (two) times daily.      cholecalciferol, vitamin D3, (VITAMIN D3) 50 mcg (2,000 unit) Cap Take 1 capsule by mouth once daily.      cyanocobalamin 1,000 mcg/mL injection Inject 1ml weekly for 4 weeks, then 1ml every month for a year 10 mL 2    denosumab (PROLIA) 60 mg/mL Syrg Inject 1 mL (60 mg total) into the skin every 6 (six) months. 2 mL 3    docosahexanoic acid/vit C/lut (EYE HEALTH ORAL) Take 1 tablet by mouth once daily.      hydroCHLOROthiazide (MICROZIDE) 12.5 mg capsule Take 1 capsule (12.5 mg total) by mouth once daily. 90 capsule 3    hydrocortisone 2.5 % cream Apply topically 2 (two) times daily. 20 g 5    irbesartan-hydrochlorothiazide (AVALIDE) 300-12.5 mg per tablet Take 1 tablet by mouth once daily. 90 tablet 3    memantine (NAMENDA) 5 MG Tab Take 1 tablet (5 mg total) by mouth 2 (two) times daily. 60 tablet 11    metoprolol tartrate (LOPRESSOR) 50 MG tablet Take 1 tablet (50 mg total) by mouth 2 (two) times daily. 180 tablet 3    ondansetron (ZOFRAN) 4 MG tablet Take 4 mg by mouth every 4 (four) hours as needed.      ondansetron (ZOFRAN-ODT) 4 MG TbDL Take 1 tablet (4 mg total) by mouth every 24 hours as needed (nausea). 20 tablet 3    rosuvastatin (CRESTOR) 20 MG tablet Take 1 tablet (20 mg total) by mouth once daily. 90 tablet 3     No current facility-administered medications for this visit.   [2]   Social  History  Tobacco Use    Smoking status: Never     Passive exposure: Never    Smokeless tobacco: Never   Substance Use Topics    Alcohol use: No    Drug use: Never

## 2025-04-01 ENCOUNTER — DOCUMENT SCAN (OUTPATIENT)
Dept: HOME HEALTH SERVICES | Facility: HOSPITAL | Age: 85
End: 2025-04-01
Payer: MEDICARE

## 2025-04-04 ENCOUNTER — EXTERNAL HOME HEALTH (OUTPATIENT)
Dept: HOME HEALTH SERVICES | Facility: HOSPITAL | Age: 85
End: 2025-04-04

## 2025-06-18 DIAGNOSIS — E87.6 HYPOKALEMIA: ICD-10-CM

## 2025-06-18 DIAGNOSIS — I51.89 DIASTOLIC DYSFUNCTION: ICD-10-CM

## 2025-06-18 DIAGNOSIS — I11.9 HYPERTENSIVE LEFT VENTRICULAR HYPERTROPHY, WITHOUT HEART FAILURE: ICD-10-CM

## 2025-06-18 DIAGNOSIS — E78.49 OTHER HYPERLIPIDEMIA: ICD-10-CM

## 2025-06-18 RX ORDER — ROSUVASTATIN CALCIUM 20 MG/1
TABLET, COATED ORAL
Qty: 90 TABLET | Refills: 1 | Status: SHIPPED | OUTPATIENT
Start: 2025-06-18

## 2025-06-18 RX ORDER — POTASSIUM CHLORIDE 20 MEQ/1
20 TABLET, EXTENDED RELEASE ORAL 2 TIMES DAILY
Qty: 180 TABLET | Refills: 3 | OUTPATIENT
Start: 2025-06-18

## 2025-06-18 NOTE — TELEPHONE ENCOUNTER
No care due was identified.  [facility] Embedded Care Due Messages. Reference number: [id number].   6/18/2025 6:30:29 PM CDT

## 2025-06-19 RX ORDER — AMLODIPINE BESYLATE 10 MG/1
10 TABLET ORAL DAILY
Qty: 90 TABLET | Refills: 1 | Status: SHIPPED | OUTPATIENT
Start: 2025-06-19

## 2025-06-19 RX ORDER — NAPROXEN SODIUM 220 MG/1
TABLET, FILM COATED ORAL
Qty: 90 TABLET | Refills: 3 | Status: SHIPPED | OUTPATIENT
Start: 2025-06-19

## 2025-06-19 NOTE — TELEPHONE ENCOUNTER
Refill Routing Note   Medication(s) are not appropriate for processing by [facility] for the following reason(s):        Outside of protocol  No active prescription written by provider    [facility] action(s):  Defer  Route  Quick Discontinue  Approve      Medication Therapy Plan: ASPIRIN-OOP; POTASSIUM CL-discontinued on 9/15/2024 by Soren Narayan MD; AMLODIPINE- on the med list 3/18/25.      Appointments  past 12m or future 3m with PCP    Date Provider   Last Visit   2025 Maria C Guzman A.M., MD   Next Visit   2025 Maria C Guzman A.M., MD   ED visits in past 90 days: 0        Note composed:9:29 PM 2025

## (undated) DEVICE — Device

## (undated) DEVICE — CONTRAST VISIPAQUE 50ML

## (undated) DEVICE — SUT 2/0 30IN SILK BLK BRAI

## (undated) DEVICE — SUT 3/0 27IN COATED VICRYL

## (undated) DEVICE — SLING SWATHE UNIVERSAL FOAM

## (undated) DEVICE — SUT 2-0 VICRYL / FS-1

## (undated) DEVICE — CATH 5FR BALLOON PT HEART PACE

## (undated) DEVICE — INTRODUCER PRELUDESNAP 7F 13CM

## (undated) DEVICE — KIT LEFT HEART MANIFOLD CUSTOM

## (undated) DEVICE — ADHESIVE DERMABOND ADVANCED

## (undated) DEVICE — SCALPEL SZ 15 RETRACTABLE

## (undated) DEVICE — PAD GROUNDING CONMED ADULT

## (undated) DEVICE — SHEATH INTRODUCER 5FR 10CM

## (undated) DEVICE — DRAPE ANGIO BRACH 38X44IN

## (undated) DEVICE — SET MICRO PUNCT 4FR/MPIS-401

## (undated) DEVICE — DRAPE IOBAN2 INCISE 10.5X8IN

## (undated) DEVICE — PAD DEFIB CADENCE ADULT R2

## (undated) DEVICE — SLEEVE INTRODUCER CATH 60CM ST

## (undated) DEVICE — KIT INTRODUCER MICROPUNCTR 4F

## (undated) DEVICE — KIT PACEMAKER CUSTOM KENNER

## (undated) DEVICE — HAND CONTROL ELECTRODE PENCIL